# Patient Record
Sex: FEMALE | Race: WHITE | HISPANIC OR LATINO | Employment: STUDENT | URBAN - METROPOLITAN AREA
[De-identification: names, ages, dates, MRNs, and addresses within clinical notes are randomized per-mention and may not be internally consistent; named-entity substitution may affect disease eponyms.]

---
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
## 2022-01-12 ENCOUNTER — LAB VISIT (OUTPATIENT)
Dept: PRIMARY CARE CLINIC | Facility: CLINIC | Age: 22
End: 2022-01-12
Payer: COMMERCIAL

## 2022-01-12 DIAGNOSIS — Z20.822 CONTACT WITH AND (SUSPECTED) EXPOSURE TO COVID-19: ICD-10-CM

## 2022-01-12 LAB
CTP QC/QA: YES
SARS-COV-2 AG RESP QL IA.RAPID: NEGATIVE

## 2022-01-12 PROCEDURE — 87811 SARS-COV-2 COVID19 W/OPTIC: CPT

## 2022-03-17 ENCOUNTER — IMMUNIZATION (OUTPATIENT)
Dept: OBSTETRICS AND GYNECOLOGY | Facility: CLINIC | Age: 22
End: 2022-03-17
Payer: COMMERCIAL

## 2022-03-17 DIAGNOSIS — Z23 NEED FOR VACCINATION: Primary | ICD-10-CM

## 2022-03-17 PROCEDURE — 91305 COVID-19, MRNA, LNP-S, PF, 30 MCG/0.3 ML DOSE VACCINE (PFIZER): ICD-10-PCS | Mod: S$GLB,,, | Performed by: FAMILY MEDICINE

## 2022-03-17 PROCEDURE — 0053A COVID-19, MRNA, LNP-S, PF, 30 MCG/0.3 ML DOSE VACCINE (PFIZER): ICD-10-PCS | Mod: S$GLB,,, | Performed by: FAMILY MEDICINE

## 2022-03-17 PROCEDURE — 0053A COVID-19, MRNA, LNP-S, PF, 30 MCG/0.3 ML DOSE VACCINE (PFIZER): CPT | Mod: S$GLB,,, | Performed by: FAMILY MEDICINE

## 2022-03-17 PROCEDURE — 91305 COVID-19, MRNA, LNP-S, PF, 30 MCG/0.3 ML DOSE VACCINE (PFIZER): CPT | Mod: S$GLB,,, | Performed by: FAMILY MEDICINE

## 2022-04-26 ENCOUNTER — PATIENT MESSAGE (OUTPATIENT)
Dept: ENDOCRINOLOGY | Facility: CLINIC | Age: 22
End: 2022-04-26
Payer: COMMERCIAL

## 2022-05-12 ENCOUNTER — OFFICE VISIT (OUTPATIENT)
Dept: ENDOCRINOLOGY | Facility: CLINIC | Age: 22
End: 2022-05-12
Payer: COMMERCIAL

## 2022-05-12 VITALS
HEIGHT: 62 IN | HEART RATE: 84 BPM | DIASTOLIC BLOOD PRESSURE: 83 MMHG | WEIGHT: 204.19 LBS | SYSTOLIC BLOOD PRESSURE: 121 MMHG | BODY MASS INDEX: 37.58 KG/M2 | TEMPERATURE: 99 F

## 2022-05-12 DIAGNOSIS — E66.09 CLASS 2 OBESITY DUE TO EXCESS CALORIES WITHOUT SERIOUS COMORBIDITY WITH BODY MASS INDEX (BMI) OF 37.0 TO 37.9 IN ADULT: Primary | ICD-10-CM

## 2022-05-12 DIAGNOSIS — R79.89 ABNORMAL THYROID BLOOD TEST: ICD-10-CM

## 2022-05-12 DIAGNOSIS — L68.0 HIRSUTISM: ICD-10-CM

## 2022-05-12 DIAGNOSIS — R53.83 FATIGUE, UNSPECIFIED TYPE: ICD-10-CM

## 2022-05-12 DIAGNOSIS — L81.9 HYPERPIGMENTATION OF SKIN: ICD-10-CM

## 2022-05-12 PROBLEM — E66.812 CLASS 2 OBESITY DUE TO EXCESS CALORIES WITHOUT SERIOUS COMORBIDITY WITH BODY MASS INDEX (BMI) OF 37.0 TO 37.9 IN ADULT: Status: ACTIVE | Noted: 2022-05-12

## 2022-05-12 PROCEDURE — 3079F PR MOST RECENT DIASTOLIC BLOOD PRESSURE 80-89 MM HG: ICD-10-PCS | Mod: CPTII,S$GLB,, | Performed by: HOSPITALIST

## 2022-05-12 PROCEDURE — 99999 PR PBB SHADOW E&M-EST. PATIENT-LVL III: ICD-10-PCS | Mod: PBBFAC,,, | Performed by: HOSPITALIST

## 2022-05-12 PROCEDURE — 99214 PR OFFICE/OUTPT VISIT, EST, LEVL IV, 30-39 MIN: ICD-10-PCS | Mod: S$GLB,,, | Performed by: HOSPITALIST

## 2022-05-12 PROCEDURE — 99999 PR PBB SHADOW E&M-EST. PATIENT-LVL III: CPT | Mod: PBBFAC,,, | Performed by: HOSPITALIST

## 2022-05-12 PROCEDURE — 3074F PR MOST RECENT SYSTOLIC BLOOD PRESSURE < 130 MM HG: ICD-10-PCS | Mod: CPTII,S$GLB,, | Performed by: HOSPITALIST

## 2022-05-12 PROCEDURE — 3008F BODY MASS INDEX DOCD: CPT | Mod: CPTII,S$GLB,, | Performed by: HOSPITALIST

## 2022-05-12 PROCEDURE — 1159F MED LIST DOCD IN RCRD: CPT | Mod: CPTII,S$GLB,, | Performed by: HOSPITALIST

## 2022-05-12 PROCEDURE — 99214 OFFICE O/P EST MOD 30 MIN: CPT | Mod: S$GLB,,, | Performed by: HOSPITALIST

## 2022-05-12 PROCEDURE — 1159F PR MEDICATION LIST DOCUMENTED IN MEDICAL RECORD: ICD-10-PCS | Mod: CPTII,S$GLB,, | Performed by: HOSPITALIST

## 2022-05-12 PROCEDURE — 3079F DIAST BP 80-89 MM HG: CPT | Mod: CPTII,S$GLB,, | Performed by: HOSPITALIST

## 2022-05-12 PROCEDURE — 3074F SYST BP LT 130 MM HG: CPT | Mod: CPTII,S$GLB,, | Performed by: HOSPITALIST

## 2022-05-12 PROCEDURE — 3008F PR BODY MASS INDEX (BMI) DOCUMENTED: ICD-10-PCS | Mod: CPTII,S$GLB,, | Performed by: HOSPITALIST

## 2022-05-12 RX ORDER — DEXAMETHASONE 1 MG/1
1 TABLET ORAL NIGHTLY
Qty: 1 TABLET | Refills: 0 | Status: SHIPPED | OUTPATIENT
Start: 2022-05-12 | End: 2022-06-11

## 2022-05-12 NOTE — PATIENT INSTRUCTIONS
Thank you for completing the visit with me    In order for us to check your cortisol, we need to do the follow lab work:    For this, you will need to take 1 mg of dexamethasone at 11 PM and return the following morning at 8 AM for labs. The prescription is included with this letter/sent to your pharmacy.      It is important that you take the medication at the exact time, and get lab work done at  8 a.m the next day    The timing is important for these!  ---------------------------------------------------------------------------      Gerson Chan M.D  [facility] [address] [facility] [address], [address]  [place] [address]    Office:  [phone]  Fax:  [phone]

## 2022-05-12 NOTE — PROGRESS NOTES
Subjective:      Patient ID: Zeny Charles is a 21 y.o. female presented to Endocrinology clinic on 5/12/2022.    Chief Complaint:  Hyperpigmentation      History of Present Illness: Zeny Charles is a 21 y.o. female here for evaluation of multiple endocrine issues  Other medical issue:  Obesity     Interval history patient present for re-evaluation of thyroid dysfunction, previously given trial of low-dose levothyroxine 50 mcg, patient has since stopped taking his medication  She is here as she is concerned about hyper worsening hyperpigmentation of her neck area, hirsutism as well as mild menstrual cycle irregularity        With regards to thyroid nodule/thyroid disorder  Patient initially evaluated by me 2021 for thyroid nodule.  Patient with history of R thyroid nodule, small cystic appearing sub cm  Ultrasound done in 2016  Here for follow-up, denies any lower neck swelling at this time.    Patient is requesting lab work to evaluate thyroid function  Family history thyroid disorder: grandmother with hyperthyroidism    Given her symptoms we low I offered a trial of levothyroxine 50mcg.  Patient stopped taking this medication over the last  No known thyroid issue  Thyroid goiter:  No  Radiation Exposure:  No    Current symptoms:   No   Yes  []    [x]   Weight gain  []    [x]   Fatigue  [x]    []   Constipation  [x]    []   Hair loss  []    [x]   Brittle nails  []    [x]   Skin discoloration    Medications:  [x]    []   Lithium Use  [x]    []   Amiodarone use  [x]    []   Chemotherapy   [x]    []   Radiation Treatment  [x]    []   Supplements: Kelp, Iodine, Biotin, Selenium, Sea mayer, Bladder wrack    Reproductive Issue:  [x]    []   Infertility/abnormal menstruation  [x]    []   Recent pregnancy     Personal or Family History:  No   Yes  [x]    []   Diabetes Mellitus - Type 1  [x]    []   Celiac  [x]    []   Nonalcoholic fatty liver disease  [x]    []   Autoimmune disease  [x]    []   Anemia    Plan for pregnancy  "at this time: *no    Lab Results   Component Value Date    TSH 3.339 03/17/2021    TSH 0.975 09/09/2016    FREET4 0.96 03/17/2021    FREET4 0.93 09/09/2016    THYROPEROXID <6.0 03/17/2021     Thyroid ultrasound 3/21  Right thyroid lobe measures 5.0 x 1.5 x 1.6 cm.  There are 2 subcentimeter cystic nodules, 1 of which contains internal comet tail artifact in keeping with colloid, stable.  Left thyroid lobe measures 4.2 x 1.3 x 1.7 cm.  Parenchyma is homogeneous.  No microcalcifications.  Isthmus measures 0.3 cm. Parenchyma is homogeneous.  No microcalcifications.  No evidence for cervical lymphadenopathy.     Impression:     Right thyroid nodules not meeting criteria for fine-needle aspiration or surveillance    Patient reporting symptoms of:  Hirsutism/irregular menstrual cycle/hyperpigmentation/weight gain  Patient reports irregular years ago, with her to have to cycle in 1 month, then would skip 1 month  Denies being pregnant, recently had menstrual cycle  Not on birth control    Increase hair growth: chin and neck area  Some chest hair, umbilical area   Getting lazer hair removal  Sister with hair issue similar to patient    No increase in hand size, head size, deepening voice  Does not smoke, does not drink    Continue report weight gain, not on dietary modification, not on exercise regimen      Reviewed past surgical, medical, family, social history and updated as appropriate.    Review of Systems       Objective:   /83 (BP Location: Right arm)   Pulse 84   Temp 98.9 °F (37.2 °C) (Oral)   Ht 5' 2" (1.575 m)   Wt 92.6 kg (204 lb 3.2 oz)   BMI 37.35 kg/m²     Body mass index is 37.35 kg/m².  Vital signs reviewed    Physical Exam  Vitals and nursing note reviewed.   Constitutional:       General: She is not in acute distress.     Appearance: Normal appearance. She is well-developed. She is obese. She is not toxic-appearing.   HENT:      Head:      Comments: Mild hyperpigmentation of lower neck.  No " facial hair noted due to laser hair surgery  Neck:      Thyroid: No thyromegaly.      Comments: No obvious thyroid goiter  Pulmonary:      Effort: No respiratory distress.   Abdominal:      Tenderness: There is no abdominal tenderness.      Comments: Coarse hair around umbilicus   Musculoskeletal:         General: No deformity. Normal range of motion.      Cervical back: Normal range of motion.   Skin:     Findings: No bruising.   Neurological:      Mental Status: She is alert and oriented to person, place, and time.   Psychiatric:         Mood and Affect: Mood normal.         Lab Reviewed:   Lab Results   Component Value Date    HGBA1C 4.7 03/17/2021       Lab Results   Component Value Date    CHOL 137 04/18/2017    HDL 38 (L) 04/18/2017    LDLCALC 86.0 04/18/2017    TRIG 65 04/18/2017    CHOLHDL 27.7 04/18/2017       Lab Results   Component Value Date     03/17/2021    K 3.8 03/17/2021     03/17/2021    CO2 27 03/17/2021    GLU 88 03/17/2021    BUN 9 03/17/2021    CREATININE 0.7 03/17/2021    CALCIUM 9.0 03/17/2021    PROT 7.7 03/17/2021    ALBUMIN 4.0 03/17/2021    BILITOT 0.4 03/17/2021    ALKPHOS 67 03/17/2021    AST 15 03/17/2021    ALT 23 03/17/2021    ANIONGAP 10 03/17/2021    ESTGFRAFRICA >60 03/17/2021    EGFRNONAA >60 03/17/2021    TSH 3.339 03/17/2021        Lab Results   Component Value Date    CALCIUM 9.0 03/17/2021    ALKPHOS 67 03/17/2021    TSH 3.339 03/17/2021       Assessment     1. Class 2 obesity due to excess calories without serious comorbidity with body mass index (BMI) of 37.0 to 37.9 in adult  ACTH    Cortisol, 8AM    DHEA-sulfate    dexAMETHasone (DECADRON) 1 MG Tab    TSH    T4, Free    Comprehensive Metabolic Panel    Lipid Panel    Insulin-Like Growth Factor    17-Hydroxyprogesterone    Hemoglobin A1C    Prolactin   2. Abnormal thyroid blood test  TSH    T4, Free    Comprehensive Metabolic Panel    Lipid Panel    17-Hydroxyprogesterone    Prolactin   3. Hyperpigmentation of  skin  ACTH    Cortisol, 8AM    DHEA-sulfate    dexAMETHasone (DECADRON) 1 MG Tab    TSH    T4, Free    Comprehensive Metabolic Panel    Lipid Panel    17-Hydroxyprogesterone    Vitamin D    Hemoglobin A1C    Testosterone    Follicle Stimulating Hormone    Estradiol   4. Fatigue, unspecified type  TSH    T4, Free    Comprehensive Metabolic Panel    Lipid Panel    Vitamin D    Hemoglobin A1C    Prolactin   5. Hirsutism  Prolactin    Testosterone    Follicle Stimulating Hormone    Estradiol        Plan     No problem-specific Assessment & Plan notes found for this encounter.    Fatigue/Weight gain/Hyperpigmentation of skin  - patient reports concern multiple symptoms, requesting endocrine evaluation  - we will screen for thyroid dysfunction, screen for metabolic syndrome: PCOS, diabetes  - screening DST to evaluate Cushing's, prolactin  - not on dietary modification:  Recommend patient start on portion control/low calorie diet, recommend losing of 5-10% body weight over the next 3-6 month  - recommend starting exercise      Class 2 obesity due to excess calories without serious comorbidity with body mass index (BMI) of 37.0 to 37.9 in adult  - Body mass index is 37.35 kg/m².  - dietary discussion as above  - discussed workup to rule out Cushing's      Abnormal thyroid blood test   - patient with symptom of fatigue, elevation of TSH, positive a in the past, does have family history of thyroid dysfunction  - Interestingly negative antibody  - repeat TSH/FT4 for re-evaluation  - Okay to hold levothyroxine trial      Thyroid nodule  - ultrasound review small subcentimeter cystic appearing benign nodule on right gland  - no increase in in lower neck enlarged  - repeat ultrasound within the next 2 years      Hirsutism  - Here for evaluation of Hirsutism, does have family history, suspect possible idiopathic  - work up PCOS  - Screening Lab Work:    Testosterone, DHEAS (adrenal tumor),TSH, PRL, IGF-1, FSH/LH/estradiol  -  Pending work up, may need GYN      Advised patient to follow up with PCP for routine health maintenance care.   RTC in as needed pending workup above    Gerson Chan M.D  Endocrinology  [facility]  5/12/2022      Disclaimer: This note has been generated using voice-recognition software. There may be typographical errors that have been missed during proof-reading.

## 2022-05-25 ENCOUNTER — OFFICE VISIT (OUTPATIENT)
Dept: PSYCHIATRY | Facility: CLINIC | Age: 22
End: 2022-05-25
Payer: COMMERCIAL

## 2022-05-25 DIAGNOSIS — F90.0 ADHD (ATTENTION DEFICIT HYPERACTIVITY DISORDER), INATTENTIVE TYPE: Primary | ICD-10-CM

## 2022-05-25 PROCEDURE — 1159F PR MEDICATION LIST DOCUMENTED IN MEDICAL RECORD: ICD-10-PCS | Mod: CPTII,95,, | Performed by: NURSE PRACTITIONER

## 2022-05-25 PROCEDURE — 1160F PR REVIEW ALL MEDS BY PRESCRIBER/CLIN PHARMACIST DOCUMENTED: ICD-10-PCS | Mod: CPTII,95,, | Performed by: NURSE PRACTITIONER

## 2022-05-25 PROCEDURE — 99205 PR OFFICE/OUTPT VISIT, NEW, LEVL V, 60-74 MIN: ICD-10-PCS | Mod: 95,,, | Performed by: NURSE PRACTITIONER

## 2022-05-25 PROCEDURE — 1160F RVW MEDS BY RX/DR IN RCRD: CPT | Mod: CPTII,95,, | Performed by: NURSE PRACTITIONER

## 2022-05-25 PROCEDURE — 1159F MED LIST DOCD IN RCRD: CPT | Mod: CPTII,95,, | Performed by: NURSE PRACTITIONER

## 2022-05-25 PROCEDURE — 99205 OFFICE O/P NEW HI 60 MIN: CPT | Mod: 95,,, | Performed by: NURSE PRACTITIONER

## 2022-05-25 RX ORDER — DEXTROAMPHETAMINE SACCHARATE, AMPHETAMINE ASPARTATE, DEXTROAMPHETAMINE SULFATE AND AMPHETAMINE SULFATE 2.5; 2.5; 2.5; 2.5 MG/1; MG/1; MG/1; MG/1
10 TABLET ORAL 2 TIMES DAILY
Qty: 60 TABLET | Refills: 0 | Status: SHIPPED | OUTPATIENT
Start: 2022-07-25 | End: 2023-02-09

## 2022-05-25 RX ORDER — DEXTROAMPHETAMINE SACCHARATE, AMPHETAMINE ASPARTATE, DEXTROAMPHETAMINE SULFATE AND AMPHETAMINE SULFATE 2.5; 2.5; 2.5; 2.5 MG/1; MG/1; MG/1; MG/1
10 TABLET ORAL 2 TIMES DAILY
Qty: 60 TABLET | Refills: 0 | Status: SHIPPED | OUTPATIENT
Start: 2022-06-25 | End: 2023-02-09

## 2022-05-25 RX ORDER — DEXTROAMPHETAMINE SACCHARATE, AMPHETAMINE ASPARTATE, DEXTROAMPHETAMINE SULFATE AND AMPHETAMINE SULFATE 2.5; 2.5; 2.5; 2.5 MG/1; MG/1; MG/1; MG/1
10 TABLET ORAL 2 TIMES DAILY
Qty: 60 TABLET | Refills: 0 | Status: SHIPPED | OUTPATIENT
Start: 2022-05-25 | End: 2023-02-09

## 2022-05-25 NOTE — PROGRESS NOTES
Outpatient Psychiatry Initial Visit (MD/NP)    5/25/2022    Zeny Charles, a 21 y.o. female, presenting for initial evaluation visit. Met with patient.    Reason for Encounter: self-referral. Patient complains of attention problems  The patient location is: home  Visit type: audiovisual    Face to Face time with patient: 55 minutes  60 minutes minutes of total time spent on the encounter, which includes face to face time and non-face to face time preparing to see the patient (eg, review of tests), Obtaining and/or reviewing separately obtained history, Documenting clinical information in the electronic or other health record, Independently interpreting results (not separately reported) and communicating results to the patient/family/caregiver, or Care coordination (not separately reported).     Each patient to whom he or she provides medical services by telemedicine is:  (1) informed of the relationship between the physician and patient and the respective role of any other health care provider with respect to management of the patient; and (2) notified that he or she may decline to receive medical services by telemedicine and may withdraw from such care at any time.    History of Present Illness:    Pt is a 21-year old female who presents for psychiatric evaluation. Trouble with focusing on work. Affect appears bright with euthymic mood. Thought processes are linear, clear, and organized. Denies SI/HI/AVH.      ADHD Adult:  · Have difficulty sustaining attention in tasks or fun activities?  yes   · Don't follow through on instructions and fail to finish work?  yes   · Have difficulty organizing tasks and activities?  yes   · Avoid, dislike, or are reluctant to engage in work thar requires sustained mental effort?  yes   · Easily distracted?  yes   · Forgetful in daily activities?  yes   · Fidget with hands or feet, or squirm in seat?  no  · Have difficulty engaging in leisure activities or doing fun things quietly?   "no  · Feel "on the go" or "driven by a motor"?  yes   · Blurt out answers before questions have been completed?  yes   · Have difficulty waiting your turn, are impatient?  no  · Interrupt or intrude on others?  no    Past Psychiatric History: (none)    Psychiatric Medications: currently taking    Past trials include: (none)    Psychosocial History: Nusing school. Lives at home with family. Non family hx of mental nighat problems.   No cigarettes  No alchol  No drugs      PAST MEDICAL HISTORY:       Past Medical History:   Diagnosis Date    Factor 5 Leiden mutation, heterozygous 9/13/2016    Seizures       1 at the age of 10     Review Of Systems:     GENERAL:  No weight gain or loss  SKIN:  No rashes or lacerations  HEAD:  No headaches  EYES:  No exophthalmos, jaundice or blindness  EARS:  No dizziness, tinnitus or hearing loss  NOSE:  No changes in smell  MOUTH & THROAT:  No dyskinetic movements or obvious goiter  CHEST:  No shortness of breath, hyperventilation or cough  CARDIOVASCULAR:  No tachycardia or chest pain  ABDOMEN:  No nausea, vomiting, pain, constipation or diarrhea  URINARY:  No frequency, dysuria or sexual dysfunction  ENDOCRINE:  No polydipsia, polyuria  MUSCULOSKELETAL:  No pain or stiffness of the joints  NEUROLOGIC:  No weakness, sensory changes, seizures, confusion, memory loss, tremor or other abnormal movements    Current Evaluation:     Nutritional Screening: Considering the patient's height and weight, medications, medical history and preferences, should a referral be made to the dietitian? no    Constitutional  Vitals:  Most recent vital signs, dated greater than 90 days prior to this appointment, were not reviewed.    There were no vitals filed for this visit.     General:  unremarkable, age appropriate     Musculoskeletal  Muscle Strength/Tone:  no tic   Gait & Station:  non-ataxic     Psychiatric  Speech:  no latency; no press   Mood & Affect:  steady  congruent and appropriate   Thought " Process:  normal and logical   Associations:  intact   Thought Content:  normal, no suicidality, no homicidality, delusions, or paranoia   Insight:  intact   Judgement: behavior is adequate to circumstances   Orientation:  grossly intact   Memory: intact for content of interview   Language: grossly intact   Attention Span & Concentration:  able to focus   Fund of Knowledge:  intact and appropriate to age and level of education       Relevant Elements of Neurological Exam: not examined    Functioning in Relationships:  Spouse/partner: see above HPI  Peers: see above HPI  Employers: see above HPI    Laboratory Data  No visits with results within 1 Month(s) from this visit.   Latest known visit with results is:   Lab Visit on 01/12/2022   Component Date Value Ref Range Status    SARS Coronavirus 2 Antigen 01/12/2022 Negative  Negative Final     Acceptable 01/12/2022 Yes   Final         Medications  Outpatient Encounter Medications as of 5/25/2022   Medication Sig Dispense Refill    dexAMETHasone (DECADRON) 1 MG Tab Take 1 tablet (1 mg total) by mouth every evening. Take at 11 PM the night before labs to be drawn. 1 tablet 0    dextroamphetamine-amphetamine 10 mg Tab Take 1 tablet (10 mg total) by mouth 2 (two) times daily. 60 tablet 0    [START ON 6/25/2022] dextroamphetamine-amphetamine 10 mg Tab Take 1 tablet (10 mg total) by mouth 2 (two) times daily. 60 tablet 0    [START ON 7/25/2022] dextroamphetamine-amphetamine 10 mg Tab Take 1 tablet (10 mg total) by mouth 2 (two) times daily. 60 tablet 0    levothyroxine (SYNTHROID) 50 MCG tablet Take 1 tablet (50 mcg total) by mouth before breakfast. Please take as a single dose, on an empty stomach with glass of water, one-half to one hour before breakfast. (Patient not taking: Reported on 5/12/2022) 30 tablet 11     No facility-administered encounter medications on file as of 5/25/2022.     Assessment - Diagnosis - Goals:     Impression:        "ICD-10-CM ICD-9-CM   1. ADHD (attention deficit hyperactivity disorder), inattentive type  F90.0 314.00       Strengths and Liabilities: Strength: Patient accepts guidance/feedback, Strength: Patient is expressive/articulate., Strength: Patient is intelligent., Liability: Patient has poor health., Liability: Patient lacks coping skills.    Treatment Goals:  Specify outcomes written in observable, behavioral terms:   ADHD: will improve capacity for sustained attention and limit symptoms of distracibility    Treatment Plan/Recommendations:   · Medication Management: The risks and benefits of medication were discussed with the patient.  · The treatment plan and follow up plan were reviewed with the patient.   · Reviewed available medical records and labs  · Start Adderall 10 mg po BID  · Keep lists, calendar. Organize lists into tiers (A, B, C). "Only handle things once."  · Recommend Adult ADHD toolbox book by Merry. Also 7 Habits of Highly Effective People  · Counseling focused on behavior modification and adaptive strategies for adults with ADHD. .    Return to Clinic: 3 months    Counseling time: 34 minutes  Total time: 60 minutes  Consulting clinician was informed of the encounter and consult note.      "

## 2022-08-09 ENCOUNTER — OFFICE VISIT (OUTPATIENT)
Dept: FAMILY MEDICINE | Facility: CLINIC | Age: 22
End: 2022-08-09
Payer: COMMERCIAL

## 2022-08-09 ENCOUNTER — PATIENT MESSAGE (OUTPATIENT)
Dept: ADMINISTRATIVE | Facility: CLINIC | Age: 22
End: 2022-08-09
Payer: COMMERCIAL

## 2022-08-09 ENCOUNTER — PATIENT MESSAGE (OUTPATIENT)
Dept: FAMILY MEDICINE | Facility: CLINIC | Age: 22
End: 2022-08-09

## 2022-08-09 DIAGNOSIS — G40.909 SEIZURE DISORDER: ICD-10-CM

## 2022-08-09 DIAGNOSIS — E66.01 SEVERE OBESITY (BMI 35.0-39.9) WITH COMORBIDITY: ICD-10-CM

## 2022-08-09 DIAGNOSIS — D68.51 FACTOR 5 LEIDEN MUTATION, HETEROZYGOUS: ICD-10-CM

## 2022-08-09 DIAGNOSIS — U07.1 COVID-19: Primary | ICD-10-CM

## 2022-08-09 PROCEDURE — 99214 OFFICE O/P EST MOD 30 MIN: CPT | Mod: 95,,, | Performed by: NURSE PRACTITIONER

## 2022-08-09 PROCEDURE — 99214 PR OFFICE/OUTPT VISIT, EST, LEVL IV, 30-39 MIN: ICD-10-PCS | Mod: 95,,, | Performed by: NURSE PRACTITIONER

## 2022-08-09 RX ORDER — CETIRIZINE HYDROCHLORIDE 10 MG/1
10 TABLET ORAL DAILY
Qty: 10 TABLET | Refills: 0 | Status: SHIPPED | OUTPATIENT
Start: 2022-08-09 | End: 2022-09-22

## 2022-08-09 RX ORDER — BENZONATATE 100 MG/1
100 CAPSULE ORAL 3 TIMES DAILY PRN
Qty: 15 CAPSULE | Refills: 0 | Status: SHIPPED | OUTPATIENT
Start: 2022-08-09 | End: 2022-09-22

## 2022-08-09 NOTE — PATIENT INSTRUCTIONS
//////////PHONE NUMBERS//////////    Bariatrics---[phone]  Breast Surgery---[phone]  Case Management---[phone]  Colonoscopy---[phone]  Imaging, Xray, CT, MRI, Ultrasound---[phone]  Infectious Disease---[phone]  Interventional Radiology---[phone]  Medical Records---[phone]  [facility]---[phone]  DME---[phone]  Optometry/Ophthalmology---[phone]  [facility]---[phone]  Physical Therapy---[phone]  Psychiatry---[phone]  Plastic Surgery---[phone]  Sleep Study---[phone]  Smoking Cessation---[phone]  Vaccine Hotline---[phone]  Wound Care---[phone]  COVID Vaccine center---[phone]  Referral Desk---[phone]    /////////////////////////////////////////////////    Patient Education       Obesity Discharge Instructions, Adult   About this topic   Obesity is a health problem where your weight is higher than it should be. Doctors use a method called body mass index or BMI to measure whether you are at a healthy weight for your height. The doctor uses your weight and your height to determine your BMI. A high BMI can be an indicator of high body fat. Obesity is different from being overweight. Being overweight means your BMI is 25 or higher. Being obese means your BMI is 30 or higher. If your BMI is 40 or higher, you are considered severely or morbidly obese. Being obese may lead to many health problems. It may make it hard for you to breathe and move easily. It may raise your risk for illnesses like high blood sugar and heart disease.  What care is needed at home?   Ask your doctor what you need to do when you go home. Make sure you understand everything the doctor says. This way you will know what you need to do.  Change your diet. Lower the calories in your diet. Ask your dietitian to help you set an eating plan that is right for you.  Get enough exercise. Talk to your doctor about the right amount of exercise for you.  Do not  smoke.  Limit the amount of beer, wine, and mixed drinks (alcohol) you drink.  Keep a record of your weight. Write down what you eat and drink each day. This may help you be more aware of the choices you are making.  What drugs may be needed?   The doctor may order drugs to:  Treat health problems that may cause weight gain  Lower your appetite  Help you lose weight  Will physical activity be limited?   Talk to your doctor about the right amount of exercise for you. This is especially important if you have heart problems, other illnesses, or if you recently had surgery.  Start slowly by doing more everyday activities like yard work or household chores.  Choose activities that you like to do.  What changes to diet are needed?   Whole grains are a good source of carbohydrates and fiber. Try to eat 3 to 5 servings of whole grain, high fiber foods each day. These are things like whole grain bread, bran cereals, brown rice, and whole wheat pasta.  Fruits and vegetables are good sources of fiber, vitamins, and minerals. Try to pick many kinds and colors. This will help you get different nutrients in your diet. Choose fresh, frozen, or canned fruits and vegetables. Buy plain, frozen fruits without added sugar. Buy plain, frozen vegetables without added salt and fat. Buy canned fruit in 100% juice or water. Avoid canned fruit in syrups. Buy canned vegetables with no salt added.  Milk is a good source of protein and some vitamins and minerals. Choose low-fat (1%) or fat-free milk. Eat nonfat or low-fat cheeses, ice creams, yogurt, and other dairy products.  Meats and beans are good sources of protein and iron. Eat more low-fat or lean meats like chicken without the skin, turkey without the skin, and fish. Eggs and peanut butter are good sources of protein as well. Dried peas, beans, and lentils are also good and contain fiber. Fatty fish, like salmon, tuna, mackerel, herring, and trout, are good to eat and have healthy  omega-3 fats.  Good fats can give you long-term energy. These are found in fish, nuts, seeds, and avocados. Try using olive oil, safflower oil, and low-sodium, low-fat salad dressing as a topping on foods. Use canola, olive, or peanut oil for cooking. Other healthy oils include corn, sunflower, and soybean oils.  Limit sweets such as candy and sugary drinks. Try to drink water instead. Drink diet or no calorie beverages when you want something other than water.  Cut back on solid fats, like butter, lard, and coconut oil.  Limit fatty foods such as desserts, fried foods, and chips.  Trans fats should be avoided. Most trans fats are found in processed foods, commercially baked goods, and fried foods and are very unhealthy. Saturated fat, which is different from trans fat, should be watched and limited if portions are too large. Saturated fat is found mainly in animal sources, such as meat and dairy products. Saturated fat is also found in coconut and palm oils.  Limit processed meats and most processed foods.  Limit eating out. If you choose to visit a restaurant, ask for the nutritional facts. You may also be able to find nutritional facts online. Then, you can make a plan and choose healthy items. Watch the portion size. Have large portions split and take part home for some other meal.  What problems could happen?   Low mood or self-esteem  Anxiety  Muscle pain, joint pain, or arthritis  Sleep apnea  Diabetes  High blood pressure  High cholesterol  Heart, lung, or breathing problems  Kidney or liver problems  Cancer  Gallstones  Increased sweating  Reduced fertility  Pregnancy complications  Gastroesophageal reflux disease  When do I need to call the doctor?   Signs of high or low blood sugar  Thoughts of hurting yourself  Teach Back: Helping You Understand   The Teach Back Method helps you understand the information we are giving you. After you talk with the staff, tell them in your own words what you learned. This  "helps to make sure the staff has described each thing clearly. It also helps to explain things that may have been confusing. Before going home, make sure you can do these:  I can tell you about my condition.  I can tell you what changes I need to make with my diet or drugs.  I can tell you what I will do if I have signs of a heart attack or stroke.  Where can I learn more?   Centers for Disease Control and Prevention  http://www.cdc.gov/obesity/adult/defining.html   [facility]  http://www.nhs.uk/Conditions/Obesity/Pages/obesityprevention.aspx   Last Reviewed Date   2021-06-23  Consumer Information Use and Disclaimer   This information is not specific medical advice and does not replace information you receive from your health care provider. This is only a brief summary of general information. It does NOT include all information about conditions, illnesses, injuries, tests, procedures, treatments, therapies, discharge instructions or life-style choices that may apply to you. You must talk with your health care provider for complete information about your health and treatment options. This information should not be used to decide whether or not to accept your health care providers advice, instructions or recommendations. Only your health care provider has the knowledge and training to provide advice that is right for you.  Copyright   Copyright © 2021 [employer] and its affiliates and/or licensors. All rights reserved.  Patient Education       Weight Loss Diet   About this topic   There are many "trendy" weight loss diets that are popular today. Many of these diets can end up being more harmful than helpful. The healthiest way to lose weight is to burn more calories than you eat.  A weight loss diet should help you have a healthy view of eating. It is NOT healthy to stop eating to try and lose weight. A good diet plan will help you cut down your food intake and make healthy choices.  A healthy weight loss goal is 1 to 2 " pounds (0.5 to 1 kg) per week. Reducing calories in your diet, burning calories through exercise, or both can help you lose weight. Combining a healthy diet with regular physical activity can help you get the best results.  To cut calories in your diet you can:  Switch from whole milk to 1% or skim milk.  Switch from regular cheese to low-fat or fat-free cheese.  Use healthier condiment choices:  Fat-free or low-fat sour cream or salad dressings  Spray butter  Diet syrups or jellies over regular  Try frozen yogurt as a dessert rather than eating ice cream.  Skip the chips. Snack on carrots, vegetables, or fruit. If chips are a favorite of yours, try the baked style and watch portion size.  Eat grilled, roasted, boiled, broiled, or baked meats. Avoid deep-frying. Choose skinless poultry, lean red meat, lean cuts of pork, and fish for good protein sources.  Try flavored no-calorie tolbert. Do not drink soda and juices that have many calories.  Choose fruit instead of sweets.  General   Eating smaller meals more often may be helpful. This will keep you from overeating at your next meal. Also, eating meals slowly helps you feel full faster.  If eating 3 meals is a part of your lifestyle, choose more lean proteins and higher fiber foods to fill you up at each meal.  Do not skip meals. Most often if you skip a meal, you eat too much at the next meal.  Eat smaller portions. Use a smaller plate or bowl for meals, and when you are eating out, eat half and take the rest home.  Plan ahead. Plan your meals and grocery list before going to the store. Planning will keep you from getting meals from restaurants.  Do not go to the grocery store hungry. You are more likely to buy snacks that are not good for you.  Portion out snacks. When you are having a snack, instead of grabbing the whole bag, portion a small amount out to give yourself a stopping point.  Drink water before and after your meals to help fill you up without the  calories.  When eating starchy foods, choose whole-grain products. These have a lot of fiber which will make you feel full. Fiber also helps lower cholesterol and helps with bowel function.  If you need a helpful start, ask your doctor to send you to a dietitian for weight loss help.         What will the results be?   Losing excess weight will make your whole body healthier. You will have more energy for your daily activities and lower your risk for health problems.  What lifestyle changes are needed?   Stay active. Eating healthy is not always enough to lose weight. Burning calories by exercising is a big part of weight loss.  What foods are good to eat?   The key is to watch your portion sizes. It is best to choose foods that are lower in fat and calories.  Choose lean meats:  Boneless, skinless chicken breast  Pork loin  90% lean beef  Lean turkey meat  Fresh fish (not fried)  Choose low-fat dairy products:  1% or skim milk  Spray butter or margarine  Low-fat or fat-free cheese  Frozen yogurt or low-calorie ice cream  Choose fresh fruits, vegetables, beans and lentils, and whole wheat products more often.  Choose water to drink more often. Drink diet or no-calorie beverages when you want something other than water. Aim to get your calories from the foods you eat.  Choose smart snacks:  Fruits  Vegetables  Low-fat or nonfat yogurt  Low-fat or no-fat cheese, such as cottage cheese  Unsalted nuts  Hard-boiled egg  Hummus  Guacamole  Natural peanut butter  Popcorn with no butter ? use pepper, garlic, or another spice to taste  Whole grain crackers  What foods should be limited or avoided?   Limit high-fat, high-sodium, and high-calorie foods like:  Fried foods  Processed meats  Whole-fat dairy products  Candy, cookies, chips, pastries  Sausage, ly, any full-fat meats  Soda, juice  Beer, wine, and mixed drinks (alcohol)  Will there be any other care needed?   What do I do first before trying to lose weight?  Talk  to your doctor and dietitian to see if you need to lose weight. Work with them to set your weight loss goals.  If you have a chronic illness, such as high blood sugar or high blood pressure, ask a doctor or dietitian what diet and exercise is right for you.  Ask your doctor about how much you are able to exercise and what type of exercise is good for you.  Helpful tips   Keep a food journal to help keep you on track.  Join a support group.  Tips for burning calories:  If your workplace is near your house, choose to walk or bike to work instead of driving.  Take 20-minute walks each day. Walk around during your lunch break. You will not only burn calories, but will raise your energy for the rest of the day.  Take the stairs over the elevator.  Join a gym or exercise class with a friend.  Try to exercise 30 minutes a day for overall health. Three 10-minute sessions work too. Aim for 60 to 90 minutes a day to lose weight.  Drink lots of water before, during, and after exercise.  Where can I learn more?   [employer]y [employer] Dietetics  https://www.eatright.org/health/weight-loss/your-health-and-your-weight/back-to-basics-for-healthy-weight-loss   Centers for Disease Control and Prevention  https://www.cdc.gov/healthyweight/healthy_eating/index.html   Familydoctor.org  https://familydoctor.org/nutrition-weight-loss-need-know-fad-diets/   [facility]  https://www.nhs.uk/live-well/healthy-weight/12-tips-to-help-you-lose-weight/?tabname=you-and-your-weight   Last Reviewed Date   2021-06-24  Consumer Information Use and Disclaimer   This information is not specific medical advice and does not replace information you receive from your health care provider. This is only a brief summary of general information. It does NOT include all information about conditions, illnesses, injuries, tests, procedures, treatments, therapies, discharge instructions or life-style choices that may apply to you. You must talk with your health care  provider for complete information about your health and treatment options. This information should not be used to decide whether or not to accept your health care providers advice, instructions or recommendations. Only your health care provider has the knowledge and training to provide advice that is right for you.  Copyright   Copyright © 2021 [employer] and its affiliates and/or licensors. All rights reserved.

## 2022-08-09 NOTE — PROGRESS NOTES
The patient location is:  Patient Home  The chief complaint leading to consultation is: as below  Visit type: Virtual visit with synchronous audio and video  Total time spent with patient: 11 minutes  Each patient to whom he or she provides medical services by telemedicine is:  (1) informed of the relationship between the physician and patient and the respective role of any other health care provider with respect to management of the patient; and (2) notified that she may decline to receive medical services by telemedicine and may withdraw from such care at any time.      HPI     Chief Complaint:  COVID19      Zeny Charles is a 21 y.o. female with multiple medical diagnoses as listed in the medical history and problem list that presents for COVID19.  Pt is new to me but is known to this clinic with her last appointment being Visit date not found.      Cough  This is a new problem. The current episode started in the past 7 days. The problem has been unchanged. The problem occurs constantly. The cough is non-productive. Associated symptoms include chills, ear pain, a fever, nasal congestion, rhinorrhea and a sore throat. Pertinent negatives include no chest pain, ear congestion, headaches, heartburn, hemoptysis, myalgias, postnasal drip, rash, shortness of breath, sweats, weight loss or wheezing. The symptoms are aggravated by cold air. She has tried OTC cough suppressant for the symptoms. The treatment provided no relief. There is no history of asthma, bronchiectasis, bronchitis, COPD, emphysema, environmental allergies or pneumonia.     Pt tested positive for COVID 19 on 8/3/22. Pt is vaccinated against COVID19. Denies previous COVID19 Dx. Reports fever of 100.1 which was reduced with tylenol. She has been taking OTC tylenol and sudafed for symptoms. Denies chest pain. Reports shortness of breath but only while coughing. Her cough is non productive.     she is compliant with medications daily without any adverse  side effects.    Assessment & Plan     Problem List Items Addressed This Visit        Neuro    Seizure disorder (Chronic)    The current medical regimen is effective;  continue present plan         Hematology    Factor 5 Leiden mutation, heterozygous    The current medical regimen is effective;  continue present plan and medications.         Endocrine    Severe obesity (BMI 35.0-39.9) with comorbidity    We discussed weight issues and safe, effective ways of losing pounds, includin) diet:  low carbohydrate, low fat diet, stay away from fast food, fried and processed food, use whole grain, lot of fruits and vegetables, use healthy fat such as avocado, nuts and olive oil in reasonable quantity, stay away from sodas. Regular meals with lean proteins.  2) physical activity: ideally 150 min a week, with cardiovascular and resistance activity.  Patient was encouraged to set realistic attainable goals for weight loss, and we will follow up periodically.        Other Visit Diagnoses     COVID-19    -  Primary    Fever which breaks with admin of tylenol/NSAIDs. Appears sickly. Reports dyspnea on exertion and fatigue. Associated symptoms include congestion, cough. Denies chest pain. Symptom onset in the past 6 days ago.     Discussed pathophysiology, symptom/presentation, and management of COVID-19.    Continue with supportive care, Tylenol every 4-6 hours as needed for fever, headaches, sore throat, ear pain, bodyaches, and/or nasal/sinus inflammation.    Zyrtec and tessalon.     Discussed the importance of hydration and rest.     Enrolled in COVID19 home symptom monitoring.    Provided patient with material outlining COVID-19 and treatment plan    Discussed treatment options. Pt is outside the window for paxlovid.     Pt is concerned about possible pneumonia. Denies sputum production. Cxray ordered.     Recommend to stay inside and isolate yourself from family members, as well as washing your hands frequently and  drinking plenty of fluids. Wear a mask if you absolutely need to go out and practice social distancing.  Before resuming your regular activities, you should be fever-free for at least 72 hours WITHOUT taking any fever reducting medications (i.e, Tylenol) and/or being at least 5 days out from the initial positive test. Wear a facemask when around others. Cover your coughs and sneezes. Wash your hands often with soap and water; hand  can be used, too. Avoid sharing personal household items. Wipe down surfaces used daily.    ED precautions given.   Notify provider if symptoms do not resolve or increase in severity.   Patient verbalizes understanding and agrees with plan of care.      Relevant Medications    pulse oximeter (PULSE OXIMETER) device    Other Relevant Orders    X-Ray Chest PA And Lateral    COVID-19 Surveillance Program (Completed)          --------------------------------------------      Health Maintenance:  Health Maintenance       Date Due Completion Date    HPV Vaccines (3 - 3-dose series) 01/01/2022 8/25/2021    Influenza Vaccine (1) 09/01/2022 11/20/2021    TETANUS VACCINE 08/27/2023 8/27/2013          Health maintenance reviewed.      Follow Up:  Follow up in about 2 weeks (around 8/23/2022), or if symptoms worsen or fail to improve.    Exam     Review of Systems:  (as noted above)  Review of Systems   Constitutional: Positive for chills and fever. Negative for weight loss.   HENT: Positive for ear pain, rhinorrhea and sore throat. Negative for postnasal drip.    Respiratory: Positive for cough. Negative for hemoptysis, shortness of breath and wheezing.    Cardiovascular: Negative for chest pain.   Gastrointestinal: Negative for heartburn.   Musculoskeletal: Negative for myalgias.   Skin: Negative for rash.   Allergic/Immunologic: Negative for environmental allergies.   Neurological: Negative for headaches.       Physical Exam:   Physical Exam  Constitutional:       General: She is not in  acute distress.     Appearance: She is obese. She is ill-appearing. She is not toxic-appearing or diaphoretic.   Pulmonary:      Effort: No respiratory distress.   Neurological:      Mental Status: She is alert.   Psychiatric:         Mood and Affect: Mood is anxious.       There were no vitals filed for this visit.   There is no height or weight on file to calculate BMI.        History     Past Medical History:  Past Medical History:   Diagnosis Date    Factor 5 Leiden mutation, heterozygous 9/13/2016    Seizures     1 at the age of 10       Past Surgical History:  Past Surgical History:   Procedure Laterality Date    TONSILLECTOMY  6 years old       Social History:  Social History     Socioeconomic History    Marital status: Single   Tobacco Use    Smoking status: Never Smoker    Smokeless tobacco: Never Used   Substance and Sexual Activity    Alcohol use: No    Drug use: No    Sexual activity: Never   Social History Narrative    In 12th grade. Lives with Mom and Dad. No smokers. No alcohol, tobacco, illicit drugs. 1 dog.        Family History:  Family History   Problem Relation Age of Onset    Hypertension Mother     Irregular menses Mother     Factor V Leiden deficiency Mother     Interstitial cystitis Mother     No Known Problems Father     Factor V Leiden deficiency Sister     Interstitial cystitis Maternal Aunt     Factor V Leiden deficiency Maternal Grandmother     Thyroid disease Maternal Grandmother         hypothyroidism    Mitral valve prolapse Maternal Grandfather     Congenital heart disease Maternal Grandfather         valve    Crohn's disease Paternal Grandmother     Breast cancer Neg Hx     Colon cancer Neg Hx     Ovarian cancer Neg Hx     Arrhythmia Neg Hx     Early death Neg Hx     Heart attacks under age 50 Neg Hx     Pacemaker/defibrilator Neg Hx        Allergies and Medications: (updated and reviewed)  Review of patient's allergies indicates:  No Known  Allergies  Current Outpatient Medications   Medication Sig Dispense Refill    benzonatate (TESSALON) 100 MG capsule Take 1 capsule (100 mg total) by mouth 3 (three) times daily as needed for Cough. 15 capsule 0    cetirizine (ZYRTEC) 10 MG tablet Take 1 tablet (10 mg total) by mouth once daily. 10 tablet 0    dextroamphetamine-amphetamine 10 mg Tab Take 1 tablet (10 mg total) by mouth 2 (two) times daily. 60 tablet 0    dextroamphetamine-amphetamine 10 mg Tab Take 1 tablet (10 mg total) by mouth 2 (two) times daily. 60 tablet 0    dextroamphetamine-amphetamine 10 mg Tab Take 1 tablet (10 mg total) by mouth 2 (two) times daily. 60 tablet 0    levothyroxine (SYNTHROID) 50 MCG tablet Take 1 tablet (50 mcg total) by mouth before breakfast. Please take as a single dose, on an empty stomach with glass of water, one-half to one hour before breakfast. (Patient not taking: Reported on 5/12/2022) 30 tablet 11    pulse oximeter (PULSE OXIMETER) device by Apply Externally route 2 (two) times a day. Use twice daily at 8 AM and 3 PM and record the value in MyChart as directed. 1 each 0     No current facility-administered medications for this visit.       Patient Care Team:  Chris Rendon MD as PCP - General (Family Medicine)  Zahra Cartwright MA (Inactive) as Care Coordinator      The patient expressed understanding and no barriers to adherence were identified.      - The patient indicates understanding of these issues and agrees with the plan. Brief care plan is updated and reviewed with the patient as applicable.      - The patient was sent an After Visit Summary virtually that lists all medications with directions, allergies, education, orders placed during this encounter and follow-up instructions.      - I have reviewed the patient's medical information including past medical, family, and social history sections including the medications and allergies.      - We discussed the patient's current medications.     This  note was created by combination of typed  and MModal dictation.  Transcription errors may be present.  If there are any questions, please contact me.       Trever Olivares NP

## 2022-08-10 ENCOUNTER — NURSE TRIAGE (OUTPATIENT)
Dept: ADMINISTRATIVE | Facility: CLINIC | Age: 22
End: 2022-08-10
Payer: COMMERCIAL

## 2022-08-10 NOTE — TELEPHONE ENCOUNTER
Called for covid surveillance program #1 and mom answered and we were disconnected and attemtped  To call back without success and will try again later this afternoon. Pt was sent my chart message as well    Reason for Disposition   Message left with person in household    Protocols used: NO CONTACT OR DUPLICATE CONTACT CALL-A-[place]

## 2022-08-10 NOTE — TELEPHONE ENCOUNTER
Pt called for covid surveillance enrollment call #2 and she didn't answer and left vm  On moms number to call OOC if any problems. I will enroll in [id number] as this was second attempt for enrollment an  Sent my chart message as well will remove task  Reason for Disposition   Message left on unidentified voice mail. Phone number verified.    Protocols used: NO CONTACT OR DUPLICATE CONTACT CALL-A-[place]

## 2022-09-14 ENCOUNTER — OFFICE VISIT (OUTPATIENT)
Dept: PODIATRY | Facility: CLINIC | Age: 22
End: 2022-09-14
Payer: COMMERCIAL

## 2022-09-14 VITALS — BODY MASS INDEX: 37.56 KG/M2 | HEIGHT: 62 IN | WEIGHT: 204.13 LBS

## 2022-09-14 DIAGNOSIS — R20.2 PARESTHESIA: Primary | ICD-10-CM

## 2022-09-14 PROCEDURE — 3008F PR BODY MASS INDEX (BMI) DOCUMENTED: ICD-10-PCS | Mod: CPTII,S$GLB,, | Performed by: PODIATRIST

## 2022-09-14 PROCEDURE — 3008F BODY MASS INDEX DOCD: CPT | Mod: CPTII,S$GLB,, | Performed by: PODIATRIST

## 2022-09-14 PROCEDURE — 99999 PR PBB SHADOW E&M-EST. PATIENT-LVL III: ICD-10-PCS | Mod: PBBFAC,,, | Performed by: PODIATRIST

## 2022-09-14 PROCEDURE — 99999 PR PBB SHADOW E&M-EST. PATIENT-LVL III: CPT | Mod: PBBFAC,,, | Performed by: PODIATRIST

## 2022-09-14 PROCEDURE — 99203 PR OFFICE/OUTPT VISIT, NEW, LEVL III, 30-44 MIN: ICD-10-PCS | Mod: S$GLB,,, | Performed by: PODIATRIST

## 2022-09-14 PROCEDURE — 99203 OFFICE O/P NEW LOW 30 MIN: CPT | Mod: S$GLB,,, | Performed by: PODIATRIST

## 2022-09-14 PROCEDURE — 1159F PR MEDICATION LIST DOCUMENTED IN MEDICAL RECORD: ICD-10-PCS | Mod: CPTII,S$GLB,, | Performed by: PODIATRIST

## 2022-09-14 PROCEDURE — 1159F MED LIST DOCD IN RCRD: CPT | Mod: CPTII,S$GLB,, | Performed by: PODIATRIST

## 2022-09-19 ENCOUNTER — TELEPHONE (OUTPATIENT)
Dept: FAMILY MEDICINE | Facility: CLINIC | Age: 22
End: 2022-09-19
Payer: COMMERCIAL

## 2022-09-19 NOTE — TELEPHONE ENCOUNTER
----- Message from Gita Jurado sent at 9/19/2022  2:38 PM CDT -----  Type:  Patient Returning Call    Who Called:pt   Does the patient know what this is regarding?:TB orders  Would the patient rather a call back or a response via [facility]? Call back   Best Call Back Number:[phone]  Additional Information: pt would like order for TB put in for her asap

## 2022-09-19 NOTE — PROGRESS NOTES
Subjective:      Patient ID: Zeny Charles is a 22 y.o. female.    Chief Complaint: Numbness and Foot Problem (Left toes seperate)    Zeny is a 22 y.o. female who presents to the podiatry clinic  with complaint of  numbness both feet.  Onset of the symptoms was several months ago. Precipitating event: none known. Current symptoms include: ability to bear weight, but with some pain. Aggravating factors: any weight bearing. Symptoms have progressed to a point and plateaued. Patient has had no prior foot problems. Evaluation to date: none. Treatment to date: none. Patients rates pain 5/10 on pain scale.    Review of Systems   Constitutional: Negative for chills.   Cardiovascular:  Negative for chest pain and claudication.   Respiratory:  Negative for cough.    Skin:  Positive for color change, dry skin and nail changes.   Musculoskeletal:  Positive for joint pain.   Gastrointestinal:  Negative for nausea.   Neurological:  Positive for paresthesias. Negative for numbness.   Psychiatric/Behavioral:  The patient is not nervous/anxious.          Objective:      Physical Exam  Constitutional:       Appearance: She is well-developed.      Comments: Oriented to time, place, and person.   Cardiovascular:      Comments: DP and PT pulses are palpable bilaterally. 3 sec capillary refill time and toes and feet are warm to touch proximally .  There is  hair growth on the feet and toes b/l. There is no edema b/l. No spider veins or varicosities present b/l.     Musculoskeletal:      Comments: Equinus noted b/l ankles with < 10 deg DF noted. MMT 5/5 in DF/PF/Inv/Ev resistance with no reproduction of pain in any direction. Passive range of motion of ankle and pedal joints is painless b/l.     Feet:      Right foot:      Skin integrity: No callus or dry skin.      Left foot:      Skin integrity: No callus or dry skin.   Lymphadenopathy:      Comments: Negative lymphadenopathy bilateral popliteal fossa and tarsal tunnel.   Skin:      Comments: No open lesions, lacerations or wounds noted.Interdigital spaces clean, dry and intact b/l. No erythema noted to b/l foot.  Nails normal color and trophic qualities.     Neurological:      Mental Status: She is alert.      Comments: Light touch, proprioception, and sharp/dull sensation are all intact bilaterally. Protective threshold with the [place]-Wienstein monofilament is intact bilaterally.      Subjective paresthesias with no clearly identifiable source or trigger.        Psychiatric:         Behavior: Behavior is cooperative.             Assessment:       Encounter Diagnosis   Name Primary?    Paresthesia Yes         Plan:       Zeny was seen today for numbness and foot problem.    Diagnoses and all orders for this visit:    Paresthesia  -     Ambulatory referral/consult to Neurology; Future      I counseled the patient on her conditions, their implications and medical management.      Referral placed to neurology- paresthesia    Discussed conservative treatment with shoes of adequate dimensions, material, and style to alleviate symptoms and delay or prevent surgical intervention.    RTC PRN

## 2022-09-20 ENCOUNTER — TELEPHONE (OUTPATIENT)
Dept: FAMILY MEDICINE | Facility: CLINIC | Age: 22
End: 2022-09-20
Payer: COMMERCIAL

## 2022-09-20 NOTE — TELEPHONE ENCOUNTER
----- Message from Edna Starr sent at 9/20/2022  1:53 PM CDT -----  .Type:  Patient Returning Call    Who Called:  self   Who Left Message for Patient:  arincecilio     Does the patient know what this is regarding?: yes     Would the patient rather a call back or a response via [facility]?  Call     Best Call Back Number: [phone]

## 2022-09-20 NOTE — TELEPHONE ENCOUNTER
----- Message from Ally Landry sent at 9/20/2022 11:32 AM CDT -----  Regarding: Self/  [phone]  Type: Patient Call Back    Who called:  Patient    What is the request in detail:  Patient returned staffs call and would like a call back.  Thank you    Would the patient rather a call back or a response via [facility]?  Call back    Best call back number: [phone]           Thank you

## 2022-09-22 ENCOUNTER — LAB VISIT (OUTPATIENT)
Dept: LAB | Facility: HOSPITAL | Age: 22
End: 2022-09-22
Attending: FAMILY MEDICINE
Payer: COMMERCIAL

## 2022-09-22 ENCOUNTER — OFFICE VISIT (OUTPATIENT)
Dept: FAMILY MEDICINE | Facility: CLINIC | Age: 22
End: 2022-09-22
Payer: COMMERCIAL

## 2022-09-22 VITALS
SYSTOLIC BLOOD PRESSURE: 112 MMHG | WEIGHT: 199.5 LBS | HEIGHT: 62 IN | OXYGEN SATURATION: 97 % | HEART RATE: 83 BPM | BODY MASS INDEX: 36.71 KG/M2 | DIASTOLIC BLOOD PRESSURE: 76 MMHG | TEMPERATURE: 98 F

## 2022-09-22 DIAGNOSIS — Z00.00 ROUTINE PHYSICAL EXAMINATION: ICD-10-CM

## 2022-09-22 DIAGNOSIS — Z00.00 ROUTINE PHYSICAL EXAMINATION: Primary | ICD-10-CM

## 2022-09-22 DIAGNOSIS — Z71.85 HPV VACCINE COUNSELING: ICD-10-CM

## 2022-09-22 PROCEDURE — 3008F PR BODY MASS INDEX (BMI) DOCUMENTED: ICD-10-PCS | Mod: CPTII,S$GLB,, | Performed by: FAMILY MEDICINE

## 2022-09-22 PROCEDURE — 90471 IMMUNIZATION ADMIN: CPT | Mod: S$GLB,,, | Performed by: FAMILY MEDICINE

## 2022-09-22 PROCEDURE — 3044F PR MOST RECENT HEMOGLOBIN A1C LEVEL <7.0%: ICD-10-PCS | Mod: CPTII,S$GLB,, | Performed by: FAMILY MEDICINE

## 2022-09-22 PROCEDURE — 86480 TB TEST CELL IMMUN MEASURE: CPT | Performed by: FAMILY MEDICINE

## 2022-09-22 PROCEDURE — 90651 HPV VACCINE 9-VALENT 3 DOSE IM: ICD-10-PCS | Mod: S$GLB,,, | Performed by: FAMILY MEDICINE

## 2022-09-22 PROCEDURE — 3044F HG A1C LEVEL LT 7.0%: CPT | Mod: CPTII,S$GLB,, | Performed by: FAMILY MEDICINE

## 2022-09-22 PROCEDURE — 99999 PR PBB SHADOW E&M-EST. PATIENT-LVL III: ICD-10-PCS | Mod: PBBFAC,,, | Performed by: FAMILY MEDICINE

## 2022-09-22 PROCEDURE — 1159F PR MEDICATION LIST DOCUMENTED IN MEDICAL RECORD: ICD-10-PCS | Mod: CPTII,S$GLB,, | Performed by: FAMILY MEDICINE

## 2022-09-22 PROCEDURE — 99395 PREV VISIT EST AGE 18-39: CPT | Mod: 25,S$GLB,, | Performed by: FAMILY MEDICINE

## 2022-09-22 PROCEDURE — 90651 9VHPV VACCINE 2/3 DOSE IM: CPT | Mod: S$GLB,,, | Performed by: FAMILY MEDICINE

## 2022-09-22 PROCEDURE — 90471 HPV VACCINE 9-VALENT 3 DOSE IM: ICD-10-PCS | Mod: S$GLB,,, | Performed by: FAMILY MEDICINE

## 2022-09-22 PROCEDURE — 99999 PR PBB SHADOW E&M-EST. PATIENT-LVL III: CPT | Mod: PBBFAC,,, | Performed by: FAMILY MEDICINE

## 2022-09-22 PROCEDURE — 1159F MED LIST DOCD IN RCRD: CPT | Mod: CPTII,S$GLB,, | Performed by: FAMILY MEDICINE

## 2022-09-22 PROCEDURE — 99395 PR PREVENTIVE VISIT,EST,18-39: ICD-10-PCS | Mod: 25,S$GLB,, | Performed by: FAMILY MEDICINE

## 2022-09-22 PROCEDURE — 3008F BODY MASS INDEX DOCD: CPT | Mod: CPTII,S$GLB,, | Performed by: FAMILY MEDICINE

## 2022-09-22 NOTE — PROGRESS NOTES
[facility]  Progress Note    SUBJECTIVE:     Chief Complaint   Patient presents with    Annual Exam    tb screening       HPI   Zeny Charles  is a 22 y.o. female here for routine physical exam. Patient has no other new complaints/problems at this time.      Review of patient's allergies indicates:  No Known Allergies    Past Medical History:   Diagnosis Date    Factor 5 Leiden mutation, heterozygous 9/13/2016    Seizures     1 at the age of 10     Past Surgical History:   Procedure Laterality Date    TONSILLECTOMY  6 years old     Family History   Problem Relation Age of Onset    Hypertension Mother     Irregular menses Mother     Factor V Leiden deficiency Mother     Interstitial cystitis Mother     No Known Problems Father     Factor V Leiden deficiency Sister     Interstitial cystitis Maternal Aunt     Factor V Leiden deficiency Maternal Grandmother     Thyroid disease Maternal Grandmother         hypothyroidism    Mitral valve prolapse Maternal Grandfather     Congenital heart disease Maternal Grandfather         valve    Crohn's disease Paternal Grandmother     Breast cancer Neg Hx     Colon cancer Neg Hx     Ovarian cancer Neg Hx     Arrhythmia Neg Hx     Early death Neg Hx     Heart attacks under age 50 Neg Hx     Pacemaker/defibrilator Neg Hx      Social History     Tobacco Use    Smoking status: Never    Smokeless tobacco: Never   Substance Use Topics    Alcohol use: No    Drug use: No        Review of Systems   Constitutional:  Negative for chills, diaphoresis and fever.   HENT:  Negative for congestion, ear pain and sore throat.    Eyes:  Negative for photophobia and discharge.   Respiratory:  Negative for cough, shortness of breath and wheezing.    Cardiovascular:  Negative for chest pain and palpitations.   Gastrointestinal:  Negative for abdominal pain, constipation, diarrhea, nausea and vomiting.   Genitourinary:  Negative for dysuria and hematuria.   Musculoskeletal:  Negative for back pain  and myalgias.   Skin:  Negative for itching and rash.   Neurological:  Negative for dizziness, sensory change, focal weakness, weakness and headaches.   All other systems reviewed and are negative.  OBJECTIVE:     Vitals:    09/22/22 0817   BP: 112/76   Pulse: 83   Temp: 98.4 °F (36.9 °C)     Body mass index is 36.49 kg/m².    Physical Exam  Constitutional:       General: She is not in acute distress.     Appearance: She is not diaphoretic.   HENT:      Head: Normocephalic and atraumatic.      Right Ear: Tympanic membrane and ear canal normal. No hemotympanum. Tympanic membrane is not perforated, erythematous or bulging.      Left Ear: Tympanic membrane and ear canal normal. No hemotympanum. Tympanic membrane is not perforated, erythematous or bulging.      Mouth/Throat:      Pharynx: No oropharyngeal exudate.   Eyes:      Conjunctiva/sclera: Conjunctivae normal.      Pupils: Pupils are equal, round, and reactive to light.   Neck:      Thyroid: No thyromegaly.   Cardiovascular:      Rate and Rhythm: Normal rate and regular rhythm.      Heart sounds: Normal heart sounds. No murmur heard.    No friction rub. No gallop.   Pulmonary:      Effort: Pulmonary effort is normal. No respiratory distress.      Breath sounds: Normal breath sounds. No wheezing or rales.   Abdominal:      General: Bowel sounds are normal. There is no distension.      Palpations: Abdomen is soft.      Tenderness: There is no abdominal tenderness. There is no guarding or rebound.   Musculoskeletal:         General: No tenderness. Normal range of motion.   Lymphadenopathy:      Cervical: No cervical adenopathy.   Skin:     General: Skin is warm.      Findings: No erythema or rash.   Neurological:      Mental Status: She is alert and oriented to person, place, and time.       Old records were reviewed. Labs and/or images were independently reviewed.    ASSESSMENT     1. Routine physical examination    2. HPV vaccine counseling        PLAN:      Routine physical examination  -     CBC Auto Differential; Future  -     Hemoglobin A1C; Future  -     Comprehensive Metabolic Panel; Future  -     Lipid Panel; Future  -     TSH; Future  -     T4, Free; Future  -     QUANTIFERON GOLD TB; Future; Expected date: 09/22/2022  -     We briefly discussed diet, exercise, and routine preventive exams. All questions and comments addressed.    HPV vaccine counseling  -     HPV Vaccine (9-Valent) (3 Dose) (IM)      RTC PRKYARA Rendon MD  09/22/2022 8:30 AM

## 2022-09-23 LAB
GAMMA INTERFERON BACKGROUND BLD IA-ACNC: 0.02 IU/ML
M TB IFN-G CD4+ BCKGRND COR BLD-ACNC: -0.01 IU/ML
MITOGEN IGNF BCKGRD COR BLD-ACNC: 4.94 IU/ML
TB GOLD PLUS: NEGATIVE
TB2 - NIL: 0.01 IU/ML

## 2022-11-22 ENCOUNTER — OFFICE VISIT (OUTPATIENT)
Dept: NEUROLOGY | Facility: CLINIC | Age: 22
End: 2022-11-22
Payer: COMMERCIAL

## 2022-11-22 VITALS
HEIGHT: 62 IN | BODY MASS INDEX: 36.55 KG/M2 | DIASTOLIC BLOOD PRESSURE: 78 MMHG | WEIGHT: 198.63 LBS | HEART RATE: 81 BPM | SYSTOLIC BLOOD PRESSURE: 119 MMHG

## 2022-11-22 DIAGNOSIS — G57.12 MERALGIA PARESTHETICA OF LEFT SIDE: ICD-10-CM

## 2022-11-22 DIAGNOSIS — M54.16 LUMBAR RADICULOPATHY, ACUTE: Primary | ICD-10-CM

## 2022-11-22 PROCEDURE — 3008F PR BODY MASS INDEX (BMI) DOCUMENTED: ICD-10-PCS | Mod: CPTII,S$GLB,, | Performed by: STUDENT IN AN ORGANIZED HEALTH CARE EDUCATION/TRAINING PROGRAM

## 2022-11-22 PROCEDURE — 3008F BODY MASS INDEX DOCD: CPT | Mod: CPTII,S$GLB,, | Performed by: STUDENT IN AN ORGANIZED HEALTH CARE EDUCATION/TRAINING PROGRAM

## 2022-11-22 PROCEDURE — 3044F HG A1C LEVEL LT 7.0%: CPT | Mod: CPTII,S$GLB,, | Performed by: STUDENT IN AN ORGANIZED HEALTH CARE EDUCATION/TRAINING PROGRAM

## 2022-11-22 PROCEDURE — 3078F DIAST BP <80 MM HG: CPT | Mod: CPTII,S$GLB,, | Performed by: STUDENT IN AN ORGANIZED HEALTH CARE EDUCATION/TRAINING PROGRAM

## 2022-11-22 PROCEDURE — 3078F PR MOST RECENT DIASTOLIC BLOOD PRESSURE < 80 MM HG: ICD-10-PCS | Mod: CPTII,S$GLB,, | Performed by: STUDENT IN AN ORGANIZED HEALTH CARE EDUCATION/TRAINING PROGRAM

## 2022-11-22 PROCEDURE — 3074F PR MOST RECENT SYSTOLIC BLOOD PRESSURE < 130 MM HG: ICD-10-PCS | Mod: CPTII,S$GLB,, | Performed by: STUDENT IN AN ORGANIZED HEALTH CARE EDUCATION/TRAINING PROGRAM

## 2022-11-22 PROCEDURE — 99999 PR PBB SHADOW E&M-EST. PATIENT-LVL III: CPT | Mod: PBBFAC,,, | Performed by: STUDENT IN AN ORGANIZED HEALTH CARE EDUCATION/TRAINING PROGRAM

## 2022-11-22 PROCEDURE — 99204 PR OFFICE/OUTPT VISIT, NEW, LEVL IV, 45-59 MIN: ICD-10-PCS | Mod: S$GLB,,, | Performed by: STUDENT IN AN ORGANIZED HEALTH CARE EDUCATION/TRAINING PROGRAM

## 2022-11-22 PROCEDURE — 3074F SYST BP LT 130 MM HG: CPT | Mod: CPTII,S$GLB,, | Performed by: STUDENT IN AN ORGANIZED HEALTH CARE EDUCATION/TRAINING PROGRAM

## 2022-11-22 PROCEDURE — 1159F PR MEDICATION LIST DOCUMENTED IN MEDICAL RECORD: ICD-10-PCS | Mod: CPTII,S$GLB,, | Performed by: STUDENT IN AN ORGANIZED HEALTH CARE EDUCATION/TRAINING PROGRAM

## 2022-11-22 PROCEDURE — 1159F MED LIST DOCD IN RCRD: CPT | Mod: CPTII,S$GLB,, | Performed by: STUDENT IN AN ORGANIZED HEALTH CARE EDUCATION/TRAINING PROGRAM

## 2022-11-22 PROCEDURE — 3044F PR MOST RECENT HEMOGLOBIN A1C LEVEL <7.0%: ICD-10-PCS | Mod: CPTII,S$GLB,, | Performed by: STUDENT IN AN ORGANIZED HEALTH CARE EDUCATION/TRAINING PROGRAM

## 2022-11-22 PROCEDURE — 99204 OFFICE O/P NEW MOD 45 MIN: CPT | Mod: S$GLB,,, | Performed by: STUDENT IN AN ORGANIZED HEALTH CARE EDUCATION/TRAINING PROGRAM

## 2022-11-22 PROCEDURE — 99999 PR PBB SHADOW E&M-EST. PATIENT-LVL III: ICD-10-PCS | Mod: PBBFAC,,, | Performed by: STUDENT IN AN ORGANIZED HEALTH CARE EDUCATION/TRAINING PROGRAM

## 2022-11-22 NOTE — PROGRESS NOTES
Chief Complaint and Duration     Numbness and tingling in both feet, resolved.  Now with numbness and tingling on side of her L thigh    History of Present Illness     Zeny Charles is a 22 y.o. female with a history of multiple medical diagnoses including having Covid this past august. Presents for parasthesias.     States she was in [employer] for prolonged stay, afterwards got numbness and tingling in both feet and also toes , evaluated by podiatrist. Referred here for parasthesias.    States numbness and tingling in feet resolved. Now with numbness and tingling that is intermittently located on her lateral thigh to knee region on the left. No specific pattern but notices it when she sits down. Does have back pain, no weakness. Does not wake her up from sleep. No bowel or bladder issues.     Works as a .     Review of Systems: (positive bolded)  Constitutional: Negative for fatigue, activity change, fevers, or chills.   HENT:  Negative for new visual disturbances or difficulty swallowing.    Respiratory:  Negative for shortness of breath.    Cardiovascular:  Negative for palpitations.   Gastrointestinal:  Negative for constipation, nausea, or vomiting.   Endocrine: Negative for temperature instability/intolerance.   Genitourinary:  Negative for difficulty urinating.   Musculoskeletal:  Negative for neck pain, back pain, myalgias, joint swelling, or gait problem.  Skin:  Negative for rash or lesions.   Neurological:  Negative for seizures, headaches, dizziness, tremors, syncope, speech difficulty, weakness, light-headedness, or numbness.   Hematological:  Does not bruise/bleed easily.   Psychiatric/Behavioral: Negative for decreased concentration or sleep disturbance.    Review of patient's allergies indicates:  No Known Allergies  Current Outpatient Medications   Medication Sig Dispense Refill    dextroamphetamine-amphetamine 10 mg Tab Take 1 tablet (10 mg total) by mouth 2 (two) times daily.  60 tablet 0    dextroamphetamine-amphetamine 10 mg Tab Take 1 tablet (10 mg total) by mouth 2 (two) times daily. 60 tablet 0    dextroamphetamine-amphetamine 10 mg Tab Take 1 tablet (10 mg total) by mouth 2 (two) times daily. 60 tablet 0    pulse oximeter (PULSE OXIMETER) device by Apply Externally route 2 (two) times a day. Use twice daily at 8 AM and 3 PM and record the value in [employer] as directed. 1 each 0     No current facility-administered medications for this visit.       Medical History     Past Medical History:   Diagnosis Date    Factor 5 Leiden mutation, heterozygous 9/13/2016    Seizures     1 at the age of 10     Past Surgical History:   Procedure Laterality Date    TONSILLECTOMY  6 years old     Family History   Problem Relation Age of Onset    Hypertension Mother     Irregular menses Mother     Factor V Leiden deficiency Mother     Interstitial cystitis Mother     No Known Problems Father     Factor V Leiden deficiency Sister     Interstitial cystitis Maternal Aunt     Factor V Leiden deficiency Maternal Grandmother     Thyroid disease Maternal Grandmother         hypothyroidism    Mitral valve prolapse Maternal Grandfather     Congenital heart disease Maternal Grandfather         valve    Crohn's disease Paternal Grandmother     Breast cancer Neg Hx     Colon cancer Neg Hx     Ovarian cancer Neg Hx     Arrhythmia Neg Hx     Early death Neg Hx     Heart attacks under age 50 Neg Hx     Pacemaker/defibrilator Neg Hx      Social History     Socioeconomic History    Marital status: Single   Tobacco Use    Smoking status: Never    Smokeless tobacco: Never   Substance and Sexual Activity    Alcohol use: No    Drug use: No    Sexual activity: Never   Social History Narrative    In 12th grade. Lives with Mom and Dad. No smokers. No alcohol, tobacco, illicit drugs. 1 dog.        Exam     Vitals:    11/22/22 0746   BP: 119/78   Pulse: 81      Physical Exam:  General: She is not in acute distress. She is not  ill-appearing.   HENT: Normocephalic and atraumatic. Moist mucous membranes.  Eyes: Conjunctivae normal.   Pulmonary: Pulmonary effort is normal.   Abdominal: Abdomen is soft and flat.   Skin: Skin is warm and dry. No rashes.   Psychiatric: Mood normal.        Neurologic Exam   Mental status: oriented to person, place, and time  Attention: Normal. Concentration: normal.  Speech: speech is normal.  Cranial Nerves: PERRL, EOMI intact, V1-V3 Facial sensation intact. Symmetric facies. Hearing grossly intact. Palate and uvula midline, symmetric. No tongue deviation. Trapezius strength intact.     Motor exam: bulk and tone normal. Strength 5/5 in bilateral upper extremities: deltoids, biceps, triceps, wrist flexion/extension, finger abduction/adduction. Strength 5/5 in bilateral lower extremities: hip flexion/extension, thigh adduction/abduction, knee flexion/extension, dorsiflexion/plantarflexion, foot eversion/inversion.    Reflexes: 2+ in bilateral upper extremities: biceps and brachiaradialis, 2+ in bilateral lower extremities: patellar and achilles  Plantar reflex: normal    Sensory exam: decreased to light touch in region of femoral and lateral femoral cutaneous region    Gait exam: normal  Romberg: negative  Coordination: normal    Tremor: none    Labs and Imaging     Labs: reviewed personally  , TSH nml, A1C 4.8    Imaging: reviewed  Other procedures: reviewed    Assessment and Plan     Problem List Items Addressed This Visit    None  Visit Diagnoses       Lumbar radiculopathy, acute    -  Primary    Relevant Orders    MRI Lumbar Spine Without Contrast    Meralgia paresthetica of left side        Relevant Orders    MRI Lumbar Spine Without Contrast            Follow-up: Follow up if symptoms worsen or fail to improve. Will message patient w results.

## 2022-11-23 ENCOUNTER — PATIENT MESSAGE (OUTPATIENT)
Dept: PULMONOLOGY | Facility: CLINIC | Age: 22
End: 2022-11-23
Payer: COMMERCIAL

## 2022-12-01 ENCOUNTER — HOSPITAL ENCOUNTER (OUTPATIENT)
Dept: RADIOLOGY | Facility: HOSPITAL | Age: 22
Discharge: HOME | End: 2022-12-01
Attending: STUDENT IN AN ORGANIZED HEALTH CARE EDUCATION/TRAINING PROGRAM
Payer: COMMERCIAL

## 2022-12-01 DIAGNOSIS — G57.12 MERALGIA PARESTHETICA OF LEFT SIDE: ICD-10-CM

## 2022-12-01 DIAGNOSIS — M54.16 LUMBAR RADICULOPATHY, ACUTE: ICD-10-CM

## 2022-12-01 PROCEDURE — 72148 MRI LUMBAR SPINE W/O DYE: CPT | Mod: TC

## 2022-12-01 PROCEDURE — 72148 MRI LUMBAR SPINE WITHOUT CONTRAST: ICD-10-PCS | Mod: 26,,, | Performed by: RADIOLOGY

## 2022-12-01 PROCEDURE — 72148 MRI LUMBAR SPINE W/O DYE: CPT | Mod: 26,,, | Performed by: RADIOLOGY

## 2022-12-04 ENCOUNTER — PATIENT MESSAGE (OUTPATIENT)
Dept: NEUROLOGY | Facility: CLINIC | Age: 22
End: 2022-12-04
Payer: COMMERCIAL

## 2022-12-06 DIAGNOSIS — G57.10 MERALGIA PARESTHETICA, UNSPECIFIED LATERALITY: Primary | ICD-10-CM

## 2022-12-06 DIAGNOSIS — M54.16 LUMBAR RADICULOPATHY, CHRONIC: ICD-10-CM

## 2022-12-06 RX ORDER — GABAPENTIN 300 MG/1
300 CAPSULE ORAL 3 TIMES DAILY
Qty: 90 CAPSULE | Refills: 5 | Status: SHIPPED | OUTPATIENT
Start: 2022-12-06 | End: 2023-02-08

## 2022-12-12 NOTE — PROGRESS NOTES
[facility]   Physical Therapy Initial Evaluation     Date: 12/13/2022   Name: Zeny Charles  Clinic Number: [id number]    Therapy Diagnosis:   Encounter Diagnoses   Name Primary?    Meralgia paresthetica, unspecified laterality     Lumbar radiculopathy, chronic     Decreased strength, endurance, and mobility     Impaired sensation     Impaired functional mobility, balance, and endurance      Physician: Marcela Rendon MD    Physician Orders: PT Eval and Treat   Medical Diagnosis from Referral:   G57.10 (ICD-10-CM) - Meralgia paresthetica, unspecified laterality   M54.16 (ICD-10-CM) - Lumbar radiculopathy, chronic     Evaluation Date: 12/13/2022  Authorization Period Expiration: 12/13/2023  Plan of Care Expiration: 02/21/2023  Progress Note Due: 1/13/2023  Visit # / Visits authorized: 1/ 1   FOTO: 1/3    Precautions: Standard     Time In: 9:30  Time Out: 10:20  Total Appointment Time (timed & untimed codes): 50 minutes      SUBJECTIVE     Date of onset: few months ago (August) of insidious onset. Started having issues mostly R anterior/lateral thigh with numbness/tingling    History of current condition - Zeny reports: insidious onset of lumbar pain and numbness/tingling that began down her R anterior/lateral thigh. Patient reports pain is central low back, but symptoms occasionally travel down bilateral lower extremities and into bilateral feet. Patient also reports she often feels unsteady on her feet    Falls: none reported, but reports poor balance    Imaging, MRI studies: 11/22/22  Degenerative disc disease L4-5.  Mild disc bulge and subtle posterior central annular fissure noted, no resulting canal stenosis or foraminal narrowing.    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, student in nursing school  Current Level of Function: difficulty bending, lifting     Pain:  Current 0/10, worst 8/10, best 0/10   Location: right back ,  buttocks , and feet    Description: Aching (mid low back)  Aggravating Factors: Flexing and Lifting  Easing Factors:  sitting, ibuprofen (pain)    Patients goals: decrease numbness, pain     Medical History:   Past Medical History:   Diagnosis Date    Factor 5 Leiden mutation, heterozygous 9/13/2016    Seizures     1 at the age of 10       Surgical History:   Zeny Charles  has a past surgical history that includes Tonsillectomy (6 years old).    Medications:   Zeny has a current medication list which includes the following prescription(s): dextroamphetamine-amphetamine, dextroamphetamine-amphetamine, dextroamphetamine-amphetamine, gabapentin, and pulse oximeter.    Allergies:   Review of patient's allergies indicates:  No Known Allergies       OBJECTIVE     Observation: no difficulty transitions from sit<> stand, bed mobility    Posture:  increased lordosis, knee valgus, genu recurvatum    Lumbar Range of Motion:    Limitations Pain   Flexion Fingerprints to toes   +      Extension 60   +        Left Side Bending WFL +        Right Side Bending WFL -            Lower Extremity Strength  Right LE  Left LE    Quadriceps: 3+/5 Quadriceps: 4/5   Hamstrings: 3+/5 Hamstrings: 3+/5   Iliospoas: 3+/5 Iliospoas: 3+/5   Hip extension:  3/5 Hip extension: 3+/5   Hip abduction: 3-/5 Hip abduction: 4/5   Ankle dorsiflexion: 4/5 Ankle dorsiflexion: 4/5   Ankle plantarflexion: 3/5 Ankle plantarflexion: 3/5     Sensation: intact, altered thought R L4-L5-S1, L L4    Reflexes:  -Patellar (L3-L4): 2+ bilaterally  -Achilles (S1): 2+ bilaterally     Special Tests:  -SLR Test: + on R, - on L  -Repeated Flexion: painful, no change in symptoms  -Repeated Ext: worse with repetition  -Prone Instability Test: -  -Bridge Test: -  -Slump Test: + on R    SI Special Tests:   Distraction: -  Compression: -  SI thigh thrust: -  Sacral thrust: -    Joint Mobility: TTP, guarded throughout lumbar spine  -Segmental mobility testing:  guarded    Palpation: TTP throughout lower lumbar paraspinals  -Erector Spinae: TTP   -Multifidi activation: poor/fair    Flexibility:   -Ely's test: R = - ; L = -  -Hamstring : R = - ; L = -  -Dedrick's test: R = - ; L = -  -Tony Test: NT      Limitation/Restriction for FOTO Modified Oswestry Low Back Pain  Disability Survey    Therapist reviewed FOTO scores for Zeny Charles on 12/13/2022.   FOTO documents entered into [employer] - see Media section.    Limitation Score: 33%         TREATMENT     Total Treatment time (time-based codes) separate from Evaluation: 20 minutes      Zeny received the treatments listed below:      therapeutic exercises to develop strength, endurance, ROM, flexibility, posture, and core stabilization for 15 minutes including:  Bridges 2x10  Hooklying marches 2x10 each leg  Education     manual therapy techniques: Joint mobilizations, Manual traction, and Soft tissue Mobilization were applied to the: lumbar spine for 0 minutes, including:  Not applied due to guarding    neuromuscular re-education activities to improve: Sense, Proprioception, and Posture for 15 minutes. The following activities were included:  PPT with TrA activation  Sciatic nerve glide x15 each leg    therapeutic activities to improve functional performance for 0  minutes, including:  Lifting, carrying    hot pack:0  cold pack: 0      PATIENT EDUCATION AND HOME EXERCISES     Education provided:   -anatomy of nerve roots, neural symptoms  - flexion vs extension based movements  - body position during job duties     Written Home Exercises Provided: yes. Exercises were reviewed and Zeny was able to demonstrate them prior to the end of the session.  Zeny demonstrated good  understanding of the education provided. See EMR under Patient Instructions for exercises provided during therapy sessions.    ASSESSMENT     Zeny is a 22 y.o. female referred to outpatient Physical Therapy with a medical diagnosis of meralgia paresthetica and  lumbar radiculopathy. Patient presents with painful lumbar mobility and decreased strength of lower extremities. Patient also presents with altered sensation throughout L4 bilaterally and L5 on right lower extremity. Patient presents with s/s consistent with bulging disc with neural involvement. Patient demonstrates difficulty and increased pain with functional activities such as bending and lifting. Patient will benefit from skilled physical therapy to improve body mechanics and postural awareness    Patient prognosis is Good.   Patient will benefit from skilled outpatient Physical Therapy to address the deficits stated above and in the chart below, provide patient /family education, and to maximize patientt's level of independence.     Plan of care discussed with patient: Yes  Patient's spiritual, cultural and educational needs considered and patient is agreeable to the plan of care and goals as stated below:     Anticipated Barriers for therapy: scheduling     Medical Necessity is demonstrated by the following  History  Co-morbidities and personal factors that may impact the plan of care Co-morbidities:   none    Personal Factors:   age  coping style  lifestyle     low   Examination  Body Structures and Functions, activity limitations and participation restrictions that may impact the plan of care Body Regions:   back  lower extremities    Body Systems:    gross symmetry  ROM  strength  balance  transfers  transitions    Participation Restrictions:   Bending, lifting     Activity limitations:   Learning and applying knowledge  no deficits    Mobility  no deficits    Self care  no deficits    Domestic Life  no deficits    Community and Social Life  no deficits         low   Clinical Presentation stable and uncomplicated low   Decision Making/ Complexity Score: low     GOALS: Short Term Goals:  4 weeks  1.Report decreased lumbar pain  < / =  5/10  to increase tolerance for lifting objects  2. Increase flexion ROM  to be pain free  in order to perform ADLs without difficulty.  3. Increase strength by 1/3 MMT grade in lower extremities  to increase tolerance for ADL and work activities.  4. Pt to tolerate HEP to improve ROM and independence with ADL's    Long Term Goals: 8 weeks  1.Report decreased lumbar pain < / = 3/10  to increase tolerance for lifting objects  2.Patient goal: improve balance reactions  3.Increase strength to 4+/5 in  lower extremities  to increase tolerance for ADL and work activities.  4. Pt will report at 20% limitation on FOTO  to demonstrate increase in LE function with every day tasks.       PLAN   Plan of care Certification: 12/13/2022 to 02/23/2023.    Outpatient Physical Therapy 1 times weekly for 8 weeks to include the following interventions: Cervical/Lumbar Traction, Manual Therapy, Neuromuscular Re-ed, Patient Education, Self Care, Therapeutic Activities, and Therapeutic Exercise.     Tamica Meek, PT      I CERTIFY THE NEED FOR THESE SERVICES FURNISHED UNDER THIS PLAN OF TREATMENT AND WHILE UNDER MY CARE   Physician's comments:     Physician's Signature: ___________________________________________________

## 2022-12-13 ENCOUNTER — CLINICAL SUPPORT (OUTPATIENT)
Dept: REHABILITATION | Facility: HOSPITAL | Age: 22
End: 2022-12-13
Attending: STUDENT IN AN ORGANIZED HEALTH CARE EDUCATION/TRAINING PROGRAM
Payer: COMMERCIAL

## 2022-12-13 DIAGNOSIS — M54.16 LUMBAR RADICULOPATHY, CHRONIC: ICD-10-CM

## 2022-12-13 DIAGNOSIS — G57.10 MERALGIA PARESTHETICA, UNSPECIFIED LATERALITY: ICD-10-CM

## 2022-12-13 DIAGNOSIS — R20.1 IMPAIRED SENSATION: ICD-10-CM

## 2022-12-13 DIAGNOSIS — Z74.09 DECREASED STRENGTH, ENDURANCE, AND MOBILITY: ICD-10-CM

## 2022-12-13 DIAGNOSIS — R53.1 DECREASED STRENGTH, ENDURANCE, AND MOBILITY: ICD-10-CM

## 2022-12-13 DIAGNOSIS — R68.89 DECREASED STRENGTH, ENDURANCE, AND MOBILITY: ICD-10-CM

## 2022-12-13 DIAGNOSIS — Z74.09 IMPAIRED FUNCTIONAL MOBILITY, BALANCE, AND ENDURANCE: ICD-10-CM

## 2022-12-13 PROCEDURE — 97161 PT EVAL LOW COMPLEX 20 MIN: CPT | Mod: PN

## 2022-12-13 PROCEDURE — 97112 NEUROMUSCULAR REEDUCATION: CPT | Mod: PN

## 2022-12-13 PROCEDURE — 97110 THERAPEUTIC EXERCISES: CPT | Mod: PN

## 2022-12-13 NOTE — PLAN OF CARE
[facility]   Physical Therapy Initial Evaluation     Date: 12/13/2022   Name: Zeny Charles  Clinic Number: [id number]    Therapy Diagnosis:   Encounter Diagnoses   Name Primary?    Meralgia paresthetica, unspecified laterality     Lumbar radiculopathy, chronic     Decreased strength, endurance, and mobility     Impaired sensation     Impaired functional mobility, balance, and endurance      Physician: Marcela Rendon MD    Physician Orders: PT Eval and Treat   Medical Diagnosis from Referral:   G57.10 (ICD-10-CM) - Meralgia paresthetica, unspecified laterality   M54.16 (ICD-10-CM) - Lumbar radiculopathy, chronic     Evaluation Date: 12/13/2022  Authorization Period Expiration: 12/13/2023  Plan of Care Expiration: 02/21/2023  Progress Note Due: 1/13/2023  Visit # / Visits authorized: 1/ 1   FOTO: 1/3    Precautions: Standard     Time In: 9:30  Time Out: 10:20  Total Appointment Time (timed & untimed codes): 50 minutes      SUBJECTIVE     Date of onset: few months ago (August) of insidious onset. Started having issues mostly R anterior/lateral thigh with numbness/tingling    History of current condition - Zeny reports: insidious onset of lumbar pain and numbness/tingling that began down her R anterior/lateral thigh. Patient reports pain is central low back, but symptoms occasionally travel down bilateral lower extremities and into bilateral feet. Patient also reports she often feels unsteady on her feet    Falls: none reported, but reports poor balance    Imaging, MRI studies: 11/22/22  Degenerative disc disease L4-5.  Mild disc bulge and subtle posterior central annular fissure noted, no resulting canal stenosis or foraminal narrowing.    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, student in nursing school  Current Level of Function: difficulty bending, lifting     Pain:  Current 0/10, worst 8/10, best 0/10   Location: right back ,  buttocks , and feet    Description: Aching (mid low back)  Aggravating Factors: Flexing and Lifting  Easing Factors: sitting, ibuprofen (pain)    Patients goals: decrease numbness, pain     Medical History:   Past Medical History:   Diagnosis Date    Factor 5 Leiden mutation, heterozygous 9/13/2016    Seizures     1 at the age of 10       Surgical History:   Zeny Charles  has a past surgical history that includes Tonsillectomy (6 years old).    Medications:   Zeny has a current medication list which includes the following prescription(s): dextroamphetamine-amphetamine, dextroamphetamine-amphetamine, dextroamphetamine-amphetamine, gabapentin, and pulse oximeter.    Allergies:   Review of patient's allergies indicates:  No Known Allergies       OBJECTIVE     Observation: no difficulty transitions from sit<> stand, bed mobility    Posture:  increased lordosis, knee valgus, genu recurvatum    Lumbar Range of Motion:    Limitations Pain   Flexion Fingerprints to toes   +      Extension 60   +        Left Side Bending WFL +        Right Side Bending WFL -            Lower Extremity Strength  Right LE  Left LE    Quadriceps: 3+/5 Quadriceps: 4/5   Hamstrings: 3+/5 Hamstrings: 3+/5   Iliospoas: 3+/5 Iliospoas: 3+/5   Hip extension:  3/5 Hip extension: 3+/5   Hip abduction: 3-/5 Hip abduction: 4/5   Ankle dorsiflexion: 4/5 Ankle dorsiflexion: 4/5   Ankle plantarflexion: 3/5 Ankle plantarflexion: 3/5     Sensation: intact, altered thought R L4-L5-S1, L L4    Reflexes:  -Patellar (L3-L4): 2+ bilaterally  -Achilles (S1): 2+ bilaterally     Special Tests:  -SLR Test: + on R, - on L  -Repeated Flexion: painful, no change in symptoms  -Repeated Ext: worse with repetition  -Prone Instability Test: -  -Bridge Test: -  -Slump Test: + on R    SI Special Tests:   Distraction: -  Compression: -  SI thigh thrust: -  Sacral thrust: -    Joint Mobility: TTP, guarded throughout lumbar spine  -Segmental mobility testing:  guarded    Palpation: TTP throughout lower lumbar paraspinals  -Erector Spinae: TTP   -Multifidi activation: poor/fair    Flexibility:   -Ely's test: R = - ; L = -  -Hamstring : R = - ; L = -  -Dedrick's test: R = - ; L = -  -Tony Test: NT      Limitation/Restriction for FOTO Modified Oswestry Low Back Pain  Disability Survey    Therapist reviewed FOTO scores for Zeny Charles on 12/13/2022.   FOTO documents entered into [employer] - see Media section.    Limitation Score: 33%         TREATMENT     Total Treatment time (time-based codes) separate from Evaluation: 20 minutes      Zeny received the treatments listed below:      therapeutic exercises to develop strength, endurance, ROM, flexibility, posture, and core stabilization for 15 minutes including:  Bridges 2x10  Hooklying marches 2x10 each leg  Education     manual therapy techniques: Joint mobilizations, Manual traction, and Soft tissue Mobilization were applied to the: lumbar spine for 0 minutes, including:  Not applied due to guarding    neuromuscular re-education activities to improve: Sense, Proprioception, and Posture for 15 minutes. The following activities were included:  PPT with TrA activation  Sciatic nerve glide x15 each leg    therapeutic activities to improve functional performance for 0  minutes, including:  Lifting, carrying    hot pack:0  cold pack: 0      PATIENT EDUCATION AND HOME EXERCISES     Education provided:   -anatomy of nerve roots, neural symptoms  - flexion vs extension based movements  - body position during job duties     Written Home Exercises Provided: yes. Exercises were reviewed and Zeny was able to demonstrate them prior to the end of the session.  Zeny demonstrated good  understanding of the education provided. See EMR under Patient Instructions for exercises provided during therapy sessions.    ASSESSMENT     Zeny is a 22 y.o. female referred to outpatient Physical Therapy with a medical diagnosis of meralgia paresthetica and  lumbar radiculopathy. Patient presents with painful lumbar mobility and decreased strength of lower extremities. Patient also presents with altered sensation throughout L4 bilaterally and L5 on right lower extremity. Patient presents with s/s consistent with bulging disc with neural involvement. Patient demonstrates difficulty and increased pain with functional activities such as bending and lifting. Patient will benefit from skilled physical therapy to improve body mechanics and postural awareness    Patient prognosis is Good.   Patient will benefit from skilled outpatient Physical Therapy to address the deficits stated above and in the chart below, provide patient /family education, and to maximize patientt's level of independence.     Plan of care discussed with patient: Yes  Patient's spiritual, cultural and educational needs considered and patient is agreeable to the plan of care and goals as stated below:     Anticipated Barriers for therapy: scheduling     Medical Necessity is demonstrated by the following  History  Co-morbidities and personal factors that may impact the plan of care Co-morbidities:   none    Personal Factors:   age  coping style  lifestyle     low   Examination  Body Structures and Functions, activity limitations and participation restrictions that may impact the plan of care Body Regions:   back  lower extremities    Body Systems:    gross symmetry  ROM  strength  balance  transfers  transitions    Participation Restrictions:   Bending, lifting     Activity limitations:   Learning and applying knowledge  no deficits    Mobility  no deficits    Self care  no deficits    Domestic Life  no deficits    Community and Social Life  no deficits         low   Clinical Presentation stable and uncomplicated low   Decision Making/ Complexity Score: low     GOALS: Short Term Goals:  4 weeks  1.Report decreased lumbar pain  < / =  5/10  to increase tolerance for lifting objects  2. Increase flexion ROM  to be pain free  in order to perform ADLs without difficulty.  3. Increase strength by 1/3 MMT grade in lower extremities  to increase tolerance for ADL and work activities.  4. Pt to tolerate HEP to improve ROM and independence with ADL's    Long Term Goals: 8 weeks  1.Report decreased lumbar pain < / = 3/10  to increase tolerance for lifting objects  2.Patient goal: improve balance reactions  3.Increase strength to 4+/5 in  lower extremities  to increase tolerance for ADL and work activities.  4. Pt will report at 20% limitation on FOTO  to demonstrate increase in LE function with every day tasks.       PLAN   Plan of care Certification: 12/13/2022 to 02/23/2023.    Outpatient Physical Therapy 1 times weekly for 8 weeks to include the following interventions: Cervical/Lumbar Traction, Manual Therapy, Neuromuscular Re-ed, Patient Education, Self Care, Therapeutic Activities, and Therapeutic Exercise.     Tamica Meek, PT      I CERTIFY THE NEED FOR THESE SERVICES FURNISHED UNDER THIS PLAN OF TREATMENT AND WHILE UNDER MY CARE   Physician's comments:     Physician's Signature: ___________________________________________________

## 2022-12-17 ENCOUNTER — PATIENT MESSAGE (OUTPATIENT)
Dept: NEUROLOGY | Facility: CLINIC | Age: 22
End: 2022-12-17
Payer: COMMERCIAL

## 2023-01-20 ENCOUNTER — OFFICE VISIT (OUTPATIENT)
Dept: NEUROLOGY | Facility: CLINIC | Age: 23
End: 2023-01-20
Payer: COMMERCIAL

## 2023-01-20 ENCOUNTER — TELEPHONE (OUTPATIENT)
Dept: NEUROLOGY | Facility: CLINIC | Age: 23
End: 2023-01-20
Payer: COMMERCIAL

## 2023-01-20 ENCOUNTER — PATIENT MESSAGE (OUTPATIENT)
Dept: NEUROLOGY | Facility: CLINIC | Age: 23
End: 2023-01-20

## 2023-01-20 VITALS
BODY MASS INDEX: 35.01 KG/M2 | SYSTOLIC BLOOD PRESSURE: 120 MMHG | HEIGHT: 62 IN | HEART RATE: 86 BPM | DIASTOLIC BLOOD PRESSURE: 77 MMHG | WEIGHT: 190.25 LBS

## 2023-01-20 DIAGNOSIS — G37.9 DEMYELINATING DISEASE OF CENTRAL NERVOUS SYSTEM, UNSPECIFIED: Primary | ICD-10-CM

## 2023-01-20 DIAGNOSIS — G56.03 CARPAL TUNNEL SYNDROME, BILATERAL: ICD-10-CM

## 2023-01-20 DIAGNOSIS — G56.23 ULNAR NEUROPATHY OF BOTH UPPER EXTREMITIES: Primary | ICD-10-CM

## 2023-01-20 DIAGNOSIS — R20.2 PARESTHESIA: ICD-10-CM

## 2023-01-20 PROCEDURE — 3008F PR BODY MASS INDEX (BMI) DOCUMENTED: ICD-10-PCS | Mod: CPTII,S$GLB,, | Performed by: STUDENT IN AN ORGANIZED HEALTH CARE EDUCATION/TRAINING PROGRAM

## 2023-01-20 PROCEDURE — 99999 PR PBB SHADOW E&M-EST. PATIENT-LVL III: ICD-10-PCS | Mod: PBBFAC,,, | Performed by: STUDENT IN AN ORGANIZED HEALTH CARE EDUCATION/TRAINING PROGRAM

## 2023-01-20 PROCEDURE — 99214 OFFICE O/P EST MOD 30 MIN: CPT | Mod: S$GLB,,, | Performed by: STUDENT IN AN ORGANIZED HEALTH CARE EDUCATION/TRAINING PROGRAM

## 2023-01-20 PROCEDURE — 3074F PR MOST RECENT SYSTOLIC BLOOD PRESSURE < 130 MM HG: ICD-10-PCS | Mod: CPTII,S$GLB,, | Performed by: STUDENT IN AN ORGANIZED HEALTH CARE EDUCATION/TRAINING PROGRAM

## 2023-01-20 PROCEDURE — 3078F DIAST BP <80 MM HG: CPT | Mod: CPTII,S$GLB,, | Performed by: STUDENT IN AN ORGANIZED HEALTH CARE EDUCATION/TRAINING PROGRAM

## 2023-01-20 PROCEDURE — 3008F BODY MASS INDEX DOCD: CPT | Mod: CPTII,S$GLB,, | Performed by: STUDENT IN AN ORGANIZED HEALTH CARE EDUCATION/TRAINING PROGRAM

## 2023-01-20 PROCEDURE — 99999 PR PBB SHADOW E&M-EST. PATIENT-LVL III: CPT | Mod: PBBFAC,,, | Performed by: STUDENT IN AN ORGANIZED HEALTH CARE EDUCATION/TRAINING PROGRAM

## 2023-01-20 PROCEDURE — 99214 PR OFFICE/OUTPT VISIT, EST, LEVL IV, 30-39 MIN: ICD-10-PCS | Mod: S$GLB,,, | Performed by: STUDENT IN AN ORGANIZED HEALTH CARE EDUCATION/TRAINING PROGRAM

## 2023-01-20 PROCEDURE — 3074F SYST BP LT 130 MM HG: CPT | Mod: CPTII,S$GLB,, | Performed by: STUDENT IN AN ORGANIZED HEALTH CARE EDUCATION/TRAINING PROGRAM

## 2023-01-20 PROCEDURE — 3078F PR MOST RECENT DIASTOLIC BLOOD PRESSURE < 80 MM HG: ICD-10-PCS | Mod: CPTII,S$GLB,, | Performed by: STUDENT IN AN ORGANIZED HEALTH CARE EDUCATION/TRAINING PROGRAM

## 2023-01-20 NOTE — PROGRESS NOTES
Chief Complaint and Duration     Numbness and tingling in both feet, resolved.  Now with numbness and tingling on side of her L thigh    History of Present Illness     Zeny Charles is a 22 y.o. female with a history of multiple medical diagnoses including having Covid this past august. Presents for parasthesias.     States she was in [place] for prolonged stay, afterwards got numbness and tingling in both feet and also toes , evaluated by podiatrist. Referred here for parasthesias.    States numbness and tingling in feet resolved. Now with numbness and tingling that is intermittently located on her lateral thigh to knee region on the left. No specific pattern but notices it when she sits down. Does have back pain, no weakness. Does not wake her up from sleep. No bowel or bladder issues.     Works as a .     1/20/23 - here for followup. Continues to have paraesthesias.     Review of patient's allergies indicates:  No Known Allergies  Current Outpatient Medications   Medication Sig Dispense Refill    acetaminophen (TYLENOL) 500 MG tablet Take 500 mg by mouth every 8 (eight) hours as needed for Pain.      cholecalciferol, vitamin D3, 1,250 mcg (50,000 unit) capsule Take 1 capsule (50,000 Units total) by mouth every 7 days. 12 capsule 0    clindamycin phosphate 1% (CLINDAGEL) 1 % gel Apply topically 2 (two) times daily. (Patient not taking: Reported on 3/2/2023) 60 g 5    dextroamphetamine-amphetamine 10 mg Tab Take 1 tablet (10 mg total) by mouth 2 (two) times a day. 60 tablet 0    [START ON 4/14/2023] dextroamphetamine-amphetamine 10 mg Tab Take 1 tablet (10 mg total) by mouth 2 (two) times a day. 60 tablet 0    [START ON 5/14/2023] dextroamphetamine-amphetamine 10 mg Tab Take 1 tablet (10 mg total) by mouth 2 (two) times a day. 60 tablet 0    EScitalopram oxalate (LEXAPRO) 10 MG tablet Take 1 tablet (10 mg total) by mouth once daily. 30 tablet 11    ibuprofen (ADVIL,MOTRIN) 400 MG tablet Take  400 mg by mouth every 4 (four) hours.      tirzepatide (MOUNJARO) 5 mg/0.5 mL PnIj Inject 5 mg into the skin every 7 days. 2 pen 3     No current facility-administered medications for this visit.       Medical History     Past Medical History:   Diagnosis Date    Factor 5 Leiden mutation, heterozygous 9/13/2016    Seizures     1 at the age of 10     Past Surgical History:   Procedure Laterality Date    TONSILLECTOMY  6 years old     Family History   Problem Relation Age of Onset    Hypertension Mother     Irregular menses Mother     Factor V Leiden deficiency Mother     Interstitial cystitis Mother     No Known Problems Father     Factor V Leiden deficiency Sister     Interstitial cystitis Maternal Aunt     Factor V Leiden deficiency Maternal Grandmother     Thyroid disease Maternal Grandmother         hypothyroidism    Mitral valve prolapse Maternal Grandfather     Congenital heart disease Maternal Grandfather         valve    Crohn's disease Paternal Grandmother     Breast cancer Neg Hx     Colon cancer Neg Hx     Ovarian cancer Neg Hx     Arrhythmia Neg Hx     Early death Neg Hx     Heart attacks under age 50 Neg Hx     Pacemaker/defibrilator Neg Hx      Social History     Socioeconomic History    Marital status: Single   Tobacco Use    Smoking status: Never    Smokeless tobacco: Never   Substance and Sexual Activity    Alcohol use: No    Drug use: No    Sexual activity: Never   Social History Narrative    In 12th grade. Lives with Mom and Dad. No smokers. No alcohol, tobacco, illicit drugs. 1 dog.        Exam     Vitals:    01/20/23 0746   BP: 120/77   Pulse: 86      Physical Exam:  General: She is not in acute distress. She is not ill-appearing.   HENT: Normocephalic and atraumatic. Moist mucous membranes.  Eyes: Conjunctivae normal.   Pulmonary: Pulmonary effort is normal.   Abdominal: Abdomen is soft and flat.   Skin: Skin is warm and dry. No rashes.   Psychiatric: Mood normal.        Neurologic Exam   Mental  status: oriented to person, place, and time  Attention: Normal. Concentration: normal.  Speech: speech is normal.  Cranial Nerves: PERRL, EOMI intact, V1-V3 Facial sensation intact. Symmetric facies. Hearing grossly intact. Palate and uvula midline, symmetric. No tongue deviation. Trapezius strength intact.     Motor exam: bulk and tone normal. Strength 5/5 in bilateral upper extremities: deltoids, biceps, triceps, wrist flexion/extension, finger abduction/adduction. Strength 5/5 in bilateral lower extremities: hip flexion/extension, thigh adduction/abduction, knee flexion/extension, dorsiflexion/plantarflexion, foot eversion/inversion.    Reflexes: 2+ in bilateral upper extremities: biceps and brachiaradialis, 2+ in bilateral lower extremities: patellar and achilles  Plantar reflex: normal    Sensory exam: decreased to light touch in region of femoral and lateral femoral cutaneous region    Gait exam: normal  Romberg: negative  Coordination: normal    Tremor: none    Labs and Imaging     Labs: reviewed personally  , TSH nml, A1C 4.8    Imaging: reviewed  Other procedures: reviewed    Assessment and Plan     Problem List Items Addressed This Visit          Neuro    Ulnar neuropathy of both upper extremities - Primary    Relevant Orders    EMG W/ ULTRASOUND AND NERVE CONDUCTION TEST 2 Extremities (Completed)    Carpal tunnel syndrome, bilateral    Relevant Orders    EMG W/ ULTRASOUND AND NERVE CONDUCTION TEST 2 Extremities (Completed)       Other    Paresthesia

## 2023-01-24 ENCOUNTER — PATIENT MESSAGE (OUTPATIENT)
Dept: NEUROLOGY | Facility: CLINIC | Age: 23
End: 2023-01-24

## 2023-01-24 ENCOUNTER — OFFICE VISIT (OUTPATIENT)
Dept: NEUROLOGY | Facility: CLINIC | Age: 23
End: 2023-01-24
Payer: COMMERCIAL

## 2023-01-24 ENCOUNTER — HOSPITAL ENCOUNTER (EMERGENCY)
Facility: HOSPITAL | Age: 23
Discharge: HOME | End: 2023-01-24
Attending: EMERGENCY MEDICINE
Payer: COMMERCIAL

## 2023-01-24 ENCOUNTER — LAB VISIT (OUTPATIENT)
Dept: LAB | Facility: HOSPITAL | Age: 23
End: 2023-01-24
Attending: PSYCHIATRY & NEUROLOGY
Payer: COMMERCIAL

## 2023-01-24 VITALS
DIASTOLIC BLOOD PRESSURE: 82 MMHG | HEART RATE: 101 BPM | WEIGHT: 190 LBS | HEIGHT: 62 IN | BODY MASS INDEX: 34.96 KG/M2 | SYSTOLIC BLOOD PRESSURE: 117 MMHG

## 2023-01-24 VITALS
OXYGEN SATURATION: 98 % | HEART RATE: 101 BPM | DIASTOLIC BLOOD PRESSURE: 88 MMHG | TEMPERATURE: 99 F | RESPIRATION RATE: 18 BRPM | SYSTOLIC BLOOD PRESSURE: 126 MMHG

## 2023-01-24 DIAGNOSIS — M79.10 MUSCLE PAIN: Primary | ICD-10-CM

## 2023-01-24 DIAGNOSIS — M79.10 MUSCLE PAIN: ICD-10-CM

## 2023-01-24 DIAGNOSIS — R20.2 PARESTHESIAS: ICD-10-CM

## 2023-01-24 DIAGNOSIS — M54.50 ACUTE MIDLINE LOW BACK PAIN WITHOUT SCIATICA: Primary | ICD-10-CM

## 2023-01-24 DIAGNOSIS — M62.81 GENERALIZED MUSCLE WEAKNESS: ICD-10-CM

## 2023-01-24 DIAGNOSIS — R45.89 ANXIETY ABOUT HEALTH: ICD-10-CM

## 2023-01-24 DIAGNOSIS — M62.838 MUSCLE SPASM: ICD-10-CM

## 2023-01-24 LAB
ALBUMIN SERPL BCP-MCNC: 4 G/DL (ref 3.5–5.2)
ALP SERPL-CCNC: 58 U/L (ref 55–135)
ALT SERPL W/O P-5'-P-CCNC: 28 U/L (ref 10–44)
ANION GAP SERPL CALC-SCNC: 10 MMOL/L (ref 8–16)
AST SERPL-CCNC: 16 U/L (ref 10–40)
B-HCG UR QL: NEGATIVE
BASOPHILS # BLD AUTO: 0.05 K/UL (ref 0–0.2)
BASOPHILS NFR BLD: 0.5 % (ref 0–1.9)
BILIRUB SERPL-MCNC: 0.4 MG/DL (ref 0.1–1)
BUN SERPL-MCNC: 7 MG/DL (ref 6–20)
CALCIUM SERPL-MCNC: 9.6 MG/DL (ref 8.7–10.5)
CHLORIDE SERPL-SCNC: 104 MMOL/L (ref 95–110)
CK SERPL-CCNC: 46 U/L (ref 20–180)
CO2 SERPL-SCNC: 27 MMOL/L (ref 23–29)
CREAT SERPL-MCNC: 0.7 MG/DL (ref 0.5–1.4)
CRP SERPL-MCNC: 8.6 MG/L (ref 0–8.2)
CTP QC/QA: YES
DIFFERENTIAL METHOD: NORMAL
EOSINOPHIL # BLD AUTO: 0.1 K/UL (ref 0–0.5)
EOSINOPHIL NFR BLD: 0.7 % (ref 0–8)
ERYTHROCYTE [DISTWIDTH] IN BLOOD BY AUTOMATED COUNT: 12.2 % (ref 11.5–14.5)
EST. GFR  (NO RACE VARIABLE): >60 ML/MIN/1.73 M^2
GLUCOSE SERPL-MCNC: 73 MG/DL (ref 70–110)
HCT VFR BLD AUTO: 44.9 % (ref 37–48.5)
HGB BLD-MCNC: 14.8 G/DL (ref 12–16)
IMM GRANULOCYTES # BLD AUTO: 0.02 K/UL (ref 0–0.04)
IMM GRANULOCYTES NFR BLD AUTO: 0.2 % (ref 0–0.5)
LYMPHOCYTES # BLD AUTO: 2.4 K/UL (ref 1–4.8)
LYMPHOCYTES NFR BLD: 23.4 % (ref 18–48)
MCH RBC QN AUTO: 30.8 PG (ref 27–31)
MCHC RBC AUTO-ENTMCNC: 33 G/DL (ref 32–36)
MCV RBC AUTO: 93 FL (ref 82–98)
MONOCYTES # BLD AUTO: 0.9 K/UL (ref 0.3–1)
MONOCYTES NFR BLD: 8.8 % (ref 4–15)
NEUTROPHILS # BLD AUTO: 6.8 K/UL (ref 1.8–7.7)
NEUTROPHILS NFR BLD: 66.4 % (ref 38–73)
NRBC BLD-RTO: 0 /100 WBC
PLATELET # BLD AUTO: 315 K/UL (ref 150–450)
PMV BLD AUTO: 12.4 FL (ref 9.2–12.9)
POTASSIUM SERPL-SCNC: 3.9 MMOL/L (ref 3.5–5.1)
PROT SERPL-MCNC: 7.8 G/DL (ref 6–8.4)
RBC # BLD AUTO: 4.81 M/UL (ref 4–5.4)
SODIUM SERPL-SCNC: 141 MMOL/L (ref 136–145)
TSH SERPL DL<=0.005 MIU/L-ACNC: 0.99 UIU/ML (ref 0.4–4)
WBC # BLD AUTO: 10.29 K/UL (ref 3.9–12.7)

## 2023-01-24 PROCEDURE — 3079F DIAST BP 80-89 MM HG: CPT | Mod: CPTII,S$GLB,, | Performed by: PSYCHIATRY & NEUROLOGY

## 2023-01-24 PROCEDURE — 25000003 PHARM REV CODE 250: Performed by: STUDENT IN AN ORGANIZED HEALTH CARE EDUCATION/TRAINING PROGRAM

## 2023-01-24 PROCEDURE — 1160F PR REVIEW ALL MEDS BY PRESCRIBER/CLIN PHARMACIST DOCUMENTED: ICD-10-PCS | Mod: CPTII,S$GLB,, | Performed by: PSYCHIATRY & NEUROLOGY

## 2023-01-24 PROCEDURE — 3008F BODY MASS INDEX DOCD: CPT | Mod: CPTII,S$GLB,, | Performed by: PSYCHIATRY & NEUROLOGY

## 2023-01-24 PROCEDURE — 3079F PR MOST RECENT DIASTOLIC BLOOD PRESSURE 80-89 MM HG: ICD-10-PCS | Mod: CPTII,S$GLB,, | Performed by: PSYCHIATRY & NEUROLOGY

## 2023-01-24 PROCEDURE — 86235 NUCLEAR ANTIGEN ANTIBODY: CPT | Performed by: PSYCHIATRY & NEUROLOGY

## 2023-01-24 PROCEDURE — 84443 ASSAY THYROID STIM HORMONE: CPT | Performed by: PSYCHIATRY & NEUROLOGY

## 2023-01-24 PROCEDURE — 99215 PR OFFICE/OUTPT VISIT, EST, LEVL V, 40-54 MIN: ICD-10-PCS | Mod: S$GLB,,, | Performed by: PSYCHIATRY & NEUROLOGY

## 2023-01-24 PROCEDURE — 86431 RHEUMATOID FACTOR QUANT: CPT | Performed by: PSYCHIATRY & NEUROLOGY

## 2023-01-24 PROCEDURE — 86038 ANTINUCLEAR ANTIBODIES: CPT | Performed by: PSYCHIATRY & NEUROLOGY

## 2023-01-24 PROCEDURE — 3008F PR BODY MASS INDEX (BMI) DOCUMENTED: ICD-10-PCS | Mod: CPTII,S$GLB,, | Performed by: PSYCHIATRY & NEUROLOGY

## 2023-01-24 PROCEDURE — 85652 RBC SED RATE AUTOMATED: CPT | Performed by: PSYCHIATRY & NEUROLOGY

## 2023-01-24 PROCEDURE — 1159F PR MEDICATION LIST DOCUMENTED IN MEDICAL RECORD: ICD-10-PCS | Mod: CPTII,S$GLB,, | Performed by: PSYCHIATRY & NEUROLOGY

## 2023-01-24 PROCEDURE — 99284 EMERGENCY DEPT VISIT MOD MDM: CPT | Mod: ,,, | Performed by: EMERGENCY MEDICINE

## 2023-01-24 PROCEDURE — 3074F PR MOST RECENT SYSTOLIC BLOOD PRESSURE < 130 MM HG: ICD-10-PCS | Mod: CPTII,S$GLB,, | Performed by: PSYCHIATRY & NEUROLOGY

## 2023-01-24 PROCEDURE — 82550 ASSAY OF CK (CPK): CPT | Performed by: PSYCHIATRY & NEUROLOGY

## 2023-01-24 PROCEDURE — 80053 COMPREHEN METABOLIC PANEL: CPT | Performed by: PSYCHIATRY & NEUROLOGY

## 2023-01-24 PROCEDURE — 36415 COLL VENOUS BLD VENIPUNCTURE: CPT | Performed by: PSYCHIATRY & NEUROLOGY

## 2023-01-24 PROCEDURE — 81025 URINE PREGNANCY TEST: CPT | Performed by: STUDENT IN AN ORGANIZED HEALTH CARE EDUCATION/TRAINING PROGRAM

## 2023-01-24 PROCEDURE — 99283 EMERGENCY DEPT VISIT LOW MDM: CPT

## 2023-01-24 PROCEDURE — 99999 PR PBB SHADOW E&M-EST. PATIENT-LVL III: CPT | Mod: PBBFAC,,, | Performed by: PSYCHIATRY & NEUROLOGY

## 2023-01-24 PROCEDURE — 1159F MED LIST DOCD IN RCRD: CPT | Mod: CPTII,S$GLB,, | Performed by: PSYCHIATRY & NEUROLOGY

## 2023-01-24 PROCEDURE — 83874 ASSAY OF MYOGLOBIN: CPT | Performed by: PSYCHIATRY & NEUROLOGY

## 2023-01-24 PROCEDURE — 86200 CCP ANTIBODY: CPT | Performed by: PSYCHIATRY & NEUROLOGY

## 2023-01-24 PROCEDURE — 99215 OFFICE O/P EST HI 40 MIN: CPT | Mod: S$GLB,,, | Performed by: PSYCHIATRY & NEUROLOGY

## 2023-01-24 PROCEDURE — 86140 C-REACTIVE PROTEIN: CPT | Performed by: PSYCHIATRY & NEUROLOGY

## 2023-01-24 PROCEDURE — 85025 COMPLETE CBC W/AUTO DIFF WBC: CPT | Performed by: PSYCHIATRY & NEUROLOGY

## 2023-01-24 PROCEDURE — 99284 PR EMERGENCY DEPT VISIT,LEVEL IV: ICD-10-PCS | Mod: ,,, | Performed by: EMERGENCY MEDICINE

## 2023-01-24 PROCEDURE — 99999 PR PBB SHADOW E&M-EST. PATIENT-LVL III: ICD-10-PCS | Mod: PBBFAC,,, | Performed by: PSYCHIATRY & NEUROLOGY

## 2023-01-24 PROCEDURE — 1160F RVW MEDS BY RX/DR IN RCRD: CPT | Mod: CPTII,S$GLB,, | Performed by: PSYCHIATRY & NEUROLOGY

## 2023-01-24 PROCEDURE — 3074F SYST BP LT 130 MM HG: CPT | Mod: CPTII,S$GLB,, | Performed by: PSYCHIATRY & NEUROLOGY

## 2023-01-24 PROCEDURE — 82085 ASSAY OF ALDOLASE: CPT | Performed by: PSYCHIATRY & NEUROLOGY

## 2023-01-24 RX ORDER — IBUPROFEN 400 MG/1
800 TABLET ORAL
Status: DISCONTINUED | OUTPATIENT
Start: 2023-01-24 | End: 2023-01-24

## 2023-01-24 RX ORDER — METHOCARBAMOL 500 MG/1
500 TABLET, FILM COATED ORAL
Status: COMPLETED | OUTPATIENT
Start: 2023-01-24 | End: 2023-01-24

## 2023-01-24 RX ORDER — METHYLPREDNISOLONE 4 MG/1
TABLET ORAL
Qty: 21 EACH | Refills: 0 | Status: SHIPPED | OUTPATIENT
Start: 2023-01-24 | End: 2023-02-09

## 2023-01-24 RX ORDER — ACETAMINOPHEN 500 MG
1000 TABLET ORAL
Status: COMPLETED | OUTPATIENT
Start: 2023-01-24 | End: 2023-01-24

## 2023-01-24 RX ORDER — LIDOCAINE 50 MG/G
1 PATCH TOPICAL
Status: DISCONTINUED | OUTPATIENT
Start: 2023-01-24 | End: 2023-01-24 | Stop reason: HOSPADM

## 2023-01-24 RX ADMIN — METHOCARBAMOL TABLETS 500 MG: 500 TABLET, COATED ORAL at 04:01

## 2023-01-24 RX ADMIN — LIDOCAINE 1 PATCH: 50 PATCH CUTANEOUS at 04:01

## 2023-01-24 RX ADMIN — ACETAMINOPHEN 1000 MG: 500 TABLET ORAL at 04:01

## 2023-01-24 NOTE — PROGRESS NOTES
[facility] NEUROLOGY  [facility], [place]    Date: 1/24/23  Patient Name: Zeny Charles   MRN: [id number]   PCP: Chris Rendon  Referring Provider: No ref. provider found    Chief Complaint:  Numbness, pain  Subjective:     This patient has been evaluated by [facility] neurology in the past.  She also has outstanding elements of neurological workup from another provider.  Extensive chart review was conducted prior to today's encounter including emergency department visit earlier today.       HPI:   Ms. Zeny Charles is a 22 y.o. RH female with past medical history and current problem list as below presenting for evaluation of multiple complaints.    Patient has a several week history of numbness limited to the 4th and 5th digit and inferior surface of the palm in a symmetric fashion in the bilateral hands.  Patient endorses weakness.  This complaint has been evaluated by a separate [facility] neurologist and there is a pending MRI of the cervical spine and EMG/NCV for further evaluation.    Patient endorses several week history of bilateral decreased sensation about the torso along dermatomal levels T4-T12.  No central back pain.  No electric or shooting pains in this region.  No problems breathing.  No abdominal pains.  No reported injuries to the back.  No recent motor vehicle accidents.  No associated rash or skin lesions.    Several month history of low back pain and transient numbness in the right thigh.  Patient notes that she awoke this morning with excruciating pain in the low back without radiation which prompted visit to emergency department; no significant findings on exam and discharged with Medrol dose pack.  The patient had MRI lumbar spine completed 12/01/2022 which was largely unremarkable outside of a small bulging disc at L4-5 without narrowing of the canal or foraminal spaces.     Patient has discomfort in the bilateral ears every morning that she describes as painful.  No swelling  or redness of the external ear has been noted.  No discharge from either ear has been noted.    Patient is accompanied by her mother at the time of today's encounter.  She expresses significant concern over her daughter's condition and wants more done.    Patient was questioned repeatedly about outside stressors but denies any identifiable sources.  She is in nursing school.  Denies tick or insect bites of unknown origin.  She does describe an unusual rash suffered during what they believed to be COVID-19 infection August 2022 which left mild scarring.  No known history of autoimmune disorders.  No unexplained fevers or weight loss.  Endorses weakness in the arms and legs.  Endorses muscle soreness diffusely throughout the body most prominent in the low back.    PAST MEDICAL HISTORY:  Past Medical History:   Diagnosis Date    Factor 5 Leiden mutation, heterozygous 9/13/2016    Seizures     1 at the age of 10     Patient Active Problem List   Diagnosis    Factor 5 Leiden mutation, heterozygous    Seizure disorder    Class 2 obesity due to excess calories without serious comorbidity with body mass index (BMI) of 37.0 to 37.9 in adult    ADHD (attention deficit hyperactivity disorder), inattentive type    Severe obesity (BMI 35.0-39.9) with comorbidity    Decreased strength, endurance, and mobility    Impaired sensation    Impaired functional mobility, balance, and endurance     PAST SURGICAL HISTORY:  Past Surgical History:   Procedure Laterality Date    TONSILLECTOMY  6 years old       CURRENT MEDS:  Current Outpatient Medications   Medication Sig Dispense Refill    dextroamphetamine-amphetamine 10 mg Tab Take 1 tablet (10 mg total) by mouth 2 (two) times daily. 60 tablet 0    dextroamphetamine-amphetamine 10 mg Tab Take 1 tablet (10 mg total) by mouth 2 (two) times daily. 60 tablet 0    dextroamphetamine-amphetamine 10 mg Tab Take 1 tablet (10 mg total) by mouth 2 (two) times daily. 60 tablet 0    methylPREDNISolone  (MEDROL DOSEPACK) 4 mg tablet use as directed 21 each 0    pulse oximeter (PULSE OXIMETER) device by Apply Externally route 2 (two) times a day. Use twice daily at 8 AM and 3 PM and record the value in [employer] as directed. 1 each 0    gabapentin (NEURONTIN) 300 MG capsule Take 1 capsule (300 mg total) by mouth 3 (three) times daily. Start 1 capsule 300mg nightly x 1 week and titrate up to TID as tolerated for neuropathic pain (Patient not taking: Reported on 1/24/2023) 90 capsule 5     No current facility-administered medications for this visit.       ALLERGIES:  Review of patient's allergies indicates:  No Known Allergies    FAMILY HISTORY:  Family History   Problem Relation Age of Onset    Hypertension Mother     Irregular menses Mother     Factor V Leiden deficiency Mother     Interstitial cystitis Mother     No Known Problems Father     Factor V Leiden deficiency Sister     Interstitial cystitis Maternal Aunt     Factor V Leiden deficiency Maternal Grandmother     Thyroid disease Maternal Grandmother         hypothyroidism    Mitral valve prolapse Maternal Grandfather     Congenital heart disease Maternal Grandfather         valve    Crohn's disease Paternal Grandmother     Breast cancer Neg Hx     Colon cancer Neg Hx     Ovarian cancer Neg Hx     Arrhythmia Neg Hx     Early death Neg Hx     Heart attacks under age 50 Neg Hx     Pacemaker/defibrilator Neg Hx        SOCIAL HISTORY:  Social History     Tobacco Use    Smoking status: Never    Smokeless tobacco: Never   Substance Use Topics    Alcohol use: No    Drug use: No       Review of Systems:  Gen: no fever, no chills, (+) generalized feeling of weakness   HEENT: no double vision, no blurred vision, no eye pain, no eye exudates. no nasal congestion,   no traumatic injury of head, no neck pain, no neck stiffness. no photophobia or phonophobia at this time. ?    Heart: no chest pain, no SOB    Lungs: no SOB, no cough    MSK: (+) weakness of legs, intact ROM,  "(+) back pain  ABD: no abd pain, no N/V/D/C, no difficulty with defecation.    Extremities: No leg pain, no edema.       Objective:     Vitals:    01/24/23 1317   BP: 117/82   BP Location: Right arm   Patient Position: Sitting   BP Method: Small (Automatic)   Pulse: 101   Weight: 86.2 kg (190 lb)   Height: 5' 2" (1.575 m)       General: female in NAD, alert and awake, Aox3, well groomed, did not speak unless directly addressed for an answer during the encounter. ?    ? ?    HEENT: Head is NC/AT EOMI, pupil size: 4 mm B/L, no nystagmus noted; hearing grossly intact b/l. Mucous membrane moist, uvula midline, no pharyngeal erythema, exudates or discharges.     Bilateral tympanic membranes visible, clear, no fluid level, no ruptures.  External auditory meatus without obstruction or obvious inflammation     Neck: Supple. no nuchal rigidity.      Cardiovascular: well perfused, no cyanosis        Respiratory: Symmetric chest rise noted       Musculoskeletal: Muscle tone noted to be adequate for patient age, muscle mass is WNL. No spontaneous movements or fasciculations noted during this examination.      Patient endorses pain with even light palpation of the midline lower back and adjacent musculature.  Pain above SI joints bilaterally.  No tissue texture changes/muscle spasm or tightness were noted     Extremities: No pedal edema or calf tenderness. No cogwheel rigidity noted on B/L UE extremities.      Tinel sign negative at bilateral wrist and elbows     Neurological Examination.    Mental status: AA&O x3; Affect/mood is euthymic/congruent; no aphasia noted during examination. Patient answers simple questions appropriately & follows simple commands; no dysarthria or expressive aphasia; no idalia-neglect or extinction. Vocabulary/word finding: excellent.       Cranial Nerves: II-XII grossly intact. visual fields intact to confrontation, EOMI, no nystagmus, PERRLA,?facial muscles symmetric and no facial droop noted, patient " hearing is grossly intact b/l, ?facial sensation to sharp and light touch grossly intact B/L. Patient smiles, frowns, closes eyes ?forcefully uneventfully. Uvula midline and no difficulty with pronunciation. No SCM/trapezius/muscle of mastication weakness noted B/L. Patient has adequate control of tongue and may protrude it and move it adequately.       Muscle Function:  Giveaway strength was noted during initial portions of strength exam, but with repeated coaching, the patient was found to have full and symmetric strength throughout.  Tone WNL and Muscle bulk WNL. Left elbow flexors/extensors (5/5), Left shoulder (5/5); left Finger flexor/extensors (5/5). Right elbow flexors/extensors (5/5). Right shoulder abduction/flexion (5/5), Right finger flexors/extensors (5/5). Left LE hip flexion/extension (5/5), Left Knee flexion/extension (5/5) and Left ankle flexion/extension/Eversion/inversion (5/5). Right LE hip flexion/extension (5/5), Right Knee flexion/extension (5/5) and ankle flexion/extension/Eversion/inversion (5/5).       Sensory: Intact to light touch throughout     Reflexes: Left and Right biceps, triceps, brachioradialis are 2+/4. patellar 2+/4 on Left and 2+/4 on Right, B/L Achilles 2+/4     Coordination: no dysmetria (finger to nose negative)     Gait: adequate casual gait with stride length and arm swing WNL. Tandem gait and walking on toes and heels are WNL     Other:  12/01/2022 MRI lumbar spine without:  FINDINGS:  The alignment of the lumbar spine is normal.  The vertebral body heights are well maintained.  There is mild disc space narrowing and disc desiccation at L4-5.  No evidence of malignant bone marrow replacement process.  No fracture, no osseous lesions.  The conus medullaris terminates in good position.  L1-L2: Unremarkable  L2-L3: Unremarkable  L3-L4: Unremarkable  L4-5: Diffuse disc bulge, posterior central annular fissure there is no canal stenosis or foraminal narrowing.  L5-S1:  "Unremarkable  The upper sacrum and upper sacroiliac joints appear normal.  Impression:  Degenerative disc disease L4-5.  Mild disc bulge and subtle posterior central annular fissure noted, no resulting canal stenosis or foraminal narrowing."      Assessment:   Zeny Charles is a 22 y.o. female presenting for evaluation of multiple complaints.  Bilateral hand sensory changes are fairly localized with no abnormalities on exam.  Planned EMG/NCV may be helpful with further case definition.    Sensory symptoms described in the patient's mid section are somewhat unusual and poorly defined.    Patient reports severe back pain with relatively normal MRI lumbar spine and no palpable abnormalities on physical exam.  After extensive conversation, I instructed the patient to proceed with steroid pack provided by emergency department after she completes planned labs today.    This case does not easily align with a single lesion, constellation of lesions, or named syndromes in the neurological system.  We will initiate screening labs geared toward inflammatory and myopathic processes.  The patient has upcoming MRI cervical spine and EMG/NCV through another member of the neurology team; the patient was encouraged to proceed with these planned studies for further case definition.  If no significant findings are identified in this battery of testing, it is unlikely there would be additional avenues for workup from a neurological perspective given current exam and case specifics.    Plan:     Problem List Items Addressed This Visit    None  Visit Diagnoses       Muscle pain    -  Primary    Relevant Orders    Autoinflammatory and Autoimmunity Syndromes Panel    CK    Comprehensive Metabolic Panel    CBC Auto Differential (Completed)    Sedimentation rate    TSH (Completed)    YOLA Screen w/Reflex    Aldolase    Myoglobin, Serum    C-reactive protein    Rheumatoid factor    Cyclic citrul peptide antibody, IgG    Sjogrens syndrome-A " extractable nuclear antibody    Muscle spasm        Relevant Orders    Autoinflammatory and Autoimmunity Syndromes Panel    CK    Comprehensive Metabolic Panel    CBC Auto Differential (Completed)    Sedimentation rate    TSH (Completed)    YOLA Screen w/Reflex    Aldolase    Myoglobin, Serum    C-reactive protein    Rheumatoid factor    Cyclic citrul peptide antibody, IgG    Sjogrens syndrome-A extractable nuclear antibody    Paresthesias        Relevant Orders    Autoinflammatory and Autoimmunity Syndromes Panel    CK    Comprehensive Metabolic Panel    CBC Auto Differential (Completed)    Sedimentation rate    TSH (Completed)    YOLA Screen w/Reflex    Aldolase    Myoglobin, Serum    C-reactive protein    Rheumatoid factor    Cyclic citrul peptide antibody, IgG    Sjogrens syndrome-A extractable nuclear antibody    Generalized muscle weakness        Relevant Orders    Autoinflammatory and Autoimmunity Syndromes Panel    CK    Comprehensive Metabolic Panel    CBC Auto Differential (Completed)    Sedimentation rate    TSH (Completed)    YOLA Screen w/Reflex    Aldolase    Myoglobin, Serum    C-reactive protein    Rheumatoid factor    Cyclic citrul peptide antibody, IgG    Sjogrens syndrome-A extractable nuclear antibody    Anxiety about health              - labs as above   - EMG/NCV for further case definition as ordered by established neurologist   - will follow-up with planned MRI cervical spine results  - patient plans to continue routine follow-up with her established provider, Dr. Marcela Rendon, but was encouraged to reach out to our clinic in the future if needed.    I spent a total of 60 minutes on the day of the visit. This includes face to face time and non-face to face time preparing to see the patient (eg, review of tests), obtaining and/or reviewing separately obtained history, documenting clinical information in the electronic or other health record, independently interpreting results and communicating  results to the patient/family/caregiver, or care coordinator.    A dictation device was used to produce this document. Use of such devices sometimes results in grammatical errors or replacement of words that sound similarly.    Bryan Nieves, DO

## 2023-01-24 NOTE — LETTER
January 24, 2023      [facility]  [address]  [facility] [address]  Phone: [phone]  Fax: [phone]       Patient: Zeny Charles  YOB: 2000  Date of Visit: 01/24/2023    To Whom It May Concern:    Zeny Charles was escorted by Mrs. Charles to [facility] and under our care on 01/24/2023. Please excuse her absence from work.  If you have any questions or concerns, or if I can be of further assistance, please do not hesitate to contact my office.    Sincerely,        Bryan Nieves, DO

## 2023-01-24 NOTE — DISCHARGE INSTRUCTIONS
Please return to the emergency department if you began to develop symptoms including urinary or fecal incontinence, numbness in the perineal/rectal area or if you develop lower extremity weakness

## 2023-01-24 NOTE — ED PROVIDER NOTES
Encounter Date: 1/24/2023       History     Chief Complaint   Patient presents with    Back Pain     Pt c/o lower back pain that woke her up out of her sleep tonight. Pt states she has a bulging disc. Denies any recent falls or trauma.      Patient is a 22-year-old female with a past medical history of factor 5 leiden mutation presenting to the ED for acute worsening of lower midline back pain.  She does have a known disc herniation at L4-L5.  She was awoken from early secondary to pain that she rates 10/10.  Has taken ibuprofen without relief.  No recent/new trauma.  Denies urinary or bowel incontinence, saddle anesthesia or lower extremity weakness.  She does have occasional issues with ambulation secondary to pain.    Review of patient's allergies indicates:  No Known Allergies  Past Medical History:   Diagnosis Date    Factor 5 Leiden mutation, heterozygous 9/13/2016    Seizures     1 at the age of 10     Past Surgical History:   Procedure Laterality Date    TONSILLECTOMY  6 years old     Family History   Problem Relation Age of Onset    Hypertension Mother     Irregular menses Mother     Factor V Leiden deficiency Mother     Interstitial cystitis Mother     No Known Problems Father     Factor V Leiden deficiency Sister     Interstitial cystitis Maternal Aunt     Factor V Leiden deficiency Maternal Grandmother     Thyroid disease Maternal Grandmother         hypothyroidism    Mitral valve prolapse Maternal Grandfather     Congenital heart disease Maternal Grandfather         valve    Crohn's disease Paternal Grandmother     Breast cancer Neg Hx     Colon cancer Neg Hx     Ovarian cancer Neg Hx     Arrhythmia Neg Hx     Early death Neg Hx     Heart attacks under age 50 Neg Hx     Pacemaker/defibrilator Neg Hx      Social History     Tobacco Use    Smoking status: Never    Smokeless tobacco: Never   Substance Use Topics    Alcohol use: No    Drug use: No     Review of Systems   Constitutional:  Negative for  activity change, chills and fever.   HENT:  Negative for congestion, ear pain and sore throat.    Respiratory:  Negative for shortness of breath and stridor.    Cardiovascular:  Negative for chest pain and palpitations.   Gastrointestinal:  Negative for abdominal pain, nausea and vomiting.   Genitourinary:  Negative for dysuria and urgency.   Musculoskeletal:  Positive for back pain.   Skin:  Negative for rash.   Neurological:  Negative for dizziness, syncope, weakness and headaches.   Hematological:  Does not bruise/bleed easily.     Physical Exam     Initial Vitals [01/24/23 0336]   BP Pulse Resp Temp SpO2   126/88 101 18 98.5 °F (36.9 °C) 98 %      MAP       --         Physical Exam    Nursing note and vitals reviewed.  Constitutional: She appears well-developed. She is not diaphoretic. She is Obese . No distress.   Well-appearing.  Speaking full sentences.  No acute distress.   HENT:   Head: Normocephalic and atraumatic.   Right Ear: External ear normal.   Left Ear: External ear normal.   Neck: Neck supple.   Cardiovascular:  Normal rate, regular rhythm, normal heart sounds and intact distal pulses.           Pulmonary/Chest: Breath sounds normal. No respiratory distress. She has no wheezes. She has no rhonchi. She has no rales.   Abdominal: Abdomen is soft. She exhibits no distension. There is no abdominal tenderness. There is no rebound and no guarding.   Musculoskeletal:      Cervical back: Neck supple.      Comments: Midline lumbar tenderness to palpation at L4-L5.     Neurological: She is alert and oriented to person, place, and time. GCS score is 15. GCS eye subscore is 4. GCS verbal subscore is 5. GCS motor subscore is 6.   Cranial nerves 2-12 intact.  Intact motor/strength and sensation to upper and lower extremities bilaterally.   Skin: Skin is warm. Capillary refill takes less than 2 seconds. No rash noted.   Psychiatric: She has a normal mood and affect.       ED Course   Procedures  Labs Reviewed    POCT URINE PREGNANCY          Imaging Results    None          Medications   acetaminophen tablet 1,000 mg (1,000 mg Oral Given 1/24/23 6535)   methocarbamoL tablet 500 mg (500 mg Oral Given 1/24/23 0435)     Medical Decision Making:   History:   I obtained history from: someone other than patient.  Old Medical Records: I decided to obtain old medical records.  Old Records Summarized: records from clinic visits.       <> Summary of Records:   Patient had previously been following with Neurology for upper extremity paresthesias.  Initial Assessment:   Emergent evaluation of back pain.  She is afebrile and hemodynamically stable.  Differential Diagnosis:   Disc herniation, lumbar muscle strain  ED Management:  Additionally considered cauda equina, however she has no neurologic deficits.  Plan for outpatient Solu-Medrol pack and referral to spine clinic.  Discussed red flag symptoms for back pain and strict ED return precautions, and she expressed understanding.                        Clinical Impression:   Final diagnoses:  [M54.50] Acute midline low back pain without sciatica (Primary)        ED Disposition Condition    Discharge Stable          ED Prescriptions       Medication Sig Dispense Start Date End Date Auth. Provider    methylPREDNISolone (MEDROL DOSEPACK) 4 mg tablet use as directed 21 each 1/24/2023 2/14/2023 Royal Gamboa MD          Follow-up Information       Follow up With Specialties Details Why Contact Info    [facility] 7th Fl Neurosurgery Call in 1 day If symptoms worsen [address]  [facility] [id number]  [phone]             Royal Gamboa MD  Resident  01/24/23 0706

## 2023-01-25 ENCOUNTER — PATIENT MESSAGE (OUTPATIENT)
Dept: FAMILY MEDICINE | Facility: CLINIC | Age: 23
End: 2023-01-25
Payer: COMMERCIAL

## 2023-01-25 LAB
ALDOLASE SERPL-CCNC: 2.8 U/L (ref 1.2–7.6)
ANA SER QL IF: NORMAL
ANTI-SSA ANTIBODY: 0.09 RATIO (ref 0–0.99)
ANTI-SSA INTERPRETATION: NEGATIVE
CCP AB SER IA-ACNC: <0.5 U/ML
ERYTHROCYTE [SEDIMENTATION RATE] IN BLOOD BY PHOTOMETRIC METHOD: 32 MM/HR (ref 0–36)
RHEUMATOID FACT SERPL-ACNC: <13 IU/ML (ref 0–15)

## 2023-01-26 LAB — MYOGLOBIN SERPL-MCNC: 27 MCG/L

## 2023-01-27 ENCOUNTER — TELEPHONE (OUTPATIENT)
Dept: OBSTETRICS AND GYNECOLOGY | Facility: CLINIC | Age: 23
End: 2023-01-27
Payer: COMMERCIAL

## 2023-01-27 NOTE — TELEPHONE ENCOUNTER
Spoke with pt mother in regards to appointment time being move up . Pts  mother accepts 830 am on 2/2.

## 2023-01-31 ENCOUNTER — CLINICAL SUPPORT (OUTPATIENT)
Dept: REHABILITATION | Facility: HOSPITAL | Age: 23
End: 2023-01-31
Attending: STUDENT IN AN ORGANIZED HEALTH CARE EDUCATION/TRAINING PROGRAM
Payer: COMMERCIAL

## 2023-01-31 DIAGNOSIS — R68.89 DECREASED STRENGTH, ENDURANCE, AND MOBILITY: Primary | ICD-10-CM

## 2023-01-31 DIAGNOSIS — Z74.09 IMPAIRED FUNCTIONAL MOBILITY, BALANCE, AND ENDURANCE: ICD-10-CM

## 2023-01-31 DIAGNOSIS — Z74.09 DECREASED STRENGTH, ENDURANCE, AND MOBILITY: Primary | ICD-10-CM

## 2023-01-31 DIAGNOSIS — R20.1 IMPAIRED SENSATION: ICD-10-CM

## 2023-01-31 DIAGNOSIS — R53.1 DECREASED STRENGTH, ENDURANCE, AND MOBILITY: Primary | ICD-10-CM

## 2023-01-31 PROCEDURE — 97140 MANUAL THERAPY 1/> REGIONS: CPT | Mod: PN

## 2023-01-31 PROCEDURE — 97112 NEUROMUSCULAR REEDUCATION: CPT | Mod: PN

## 2023-01-31 PROCEDURE — 97110 THERAPEUTIC EXERCISES: CPT | Mod: PN

## 2023-01-31 NOTE — PROGRESS NOTES
[facility]   Physical Therapy Treatment Note     Name: Zeny Charles  Clinic Number: [id number]    Therapy Diagnosis:   Encounter Diagnoses   Name Primary?    Decreased strength, endurance, and mobility Yes    Impaired sensation     Impaired functional mobility, balance, and endurance      Physician: Marcela Rendon MD    Visit Date: 1/31/2023    Physician Orders: PT Eval and Treat   Medical Diagnosis from Referral:   G57.10 (ICD-10-CM) - Meralgia paresthetica, unspecified laterality   M54.16 (ICD-10-CM) - Lumbar radiculopathy, chronic      Evaluation Date: 12/13/2022  Authorization Period Expiration: 12/13/2023  Plan of Care Expiration: 02/21/2023  Progress Note Due: 2/28/2023  Visit # / Visits authorized: 1/ 20  FOTO: 2/5     Precautions: Standard       PTA Visit #: 0/5     Time In: 3:00  Time Out: 3:53  Total Billable Time: 53 minutes    SUBJECTIVE     Pt reports: Her back has been worse and the numbness as spread to her fingers. If she does more activity she will get numbness into her stomach and her back. The back is localized to her lower back. She reports that she has not been doing her exercises because [place] has [place] the last [place]. She has been drinking gabapentin to help with the N/T and drinks it 3 times a day for 1 week straight. The numbness in her hands does not radiate down her arms and the numbness in her legs is around the inside of her thigh. She denies changes in bowel/bladder.   She was not compliant with home exercise program.  Response to previous treatment: 1st treatment  Functional change: nothing    Pain: 0/10 (pain); (8/10) N/T  Location: right back , buttocks , and feet      OBJECTIVE     Objective Measures updated at progress report unless specified.     1/31/23:  Lumbar Range of Motion:     Limitations Pain   Flexion Fingerprints to toes    +      Extension 60    +         Left Side Bending WFL +         Right Side Bending WFL -              "  Lower Extremity Strength  Right LE   Left LE     Quadriceps: 3+/5 Quadriceps: 4/5   Hamstrings: 3+/5 Hamstrings: 3+/5   Iliospoas: 3+/5 Iliospoas: 3+/5   Hip extension:  3/5 Hip extension: 3+/5   Hip abduction: 4-/5 Hip abduction: 4/5   Ankle dorsiflexion: 4/5 Ankle dorsiflexion: 4/5   Ankle plantarflexion: 3/5 Ankle plantarflexion: 3/5      Sensation: intact, altered thought R L4-L5-S1, L L4     Reflexes:  -Patellar (L3-L4): 2+ bilaterally  -Achilles (S1): 2+ bilaterally   - bicep 2+ bilaterally  - triceps 2+ bilaterally      Special Tests:  -SLR Test: + on R,  +on L  -Slump Test: + on L     SI Special Tests:   Distraction: -  Compression: +  SI thigh thrust: -  Sacral thrust: +     Joint Mobility: TTP, guarded throughout lumbar spine  -Segmental mobility testing: guarded with  in prone for extension        Treatment     Zeny received the treatments listed below:      therapeutic exercises to develop strength, posture, and core stabilization for 10 minutes including:    Clams 2x10 ea  Straight leg raise 2x10 ea   Side lying hip abduction 2x10 ea    manual therapy techniques: Joint mobilizations were applied to the: low back  for 20 minutes, including:  reassessment    neuromuscular re-education activities to improve: Coordination, Posture, and motor control  for 25 minutes. The following activities were included:    Posterior pelvic tilt  with TrA activation 5"x20  Bridges 2x10  Hooklying marches 2x10 each leg  MET for right posterior innominate       Patient Education and Home Exercises     Home Exercises Provided and Patient Education Provided     Education provided:   - importance of performing Home exercise program     Written Home Exercises Provided: yes. Exercises were reviewed and Zeny was able to demonstrate them prior to the end of the session.  Zeny demonstrated good  understanding of the education provided. See EMR under Patient Instructions for exercises provided during therapy " sessions    ASSESSMENT     Zeny was seen for her first follow up since her initial evaluation. Continues to present with decrease in lumbar range of motion, decrease in bilateral lower extremity strength, possible SIJ involvement, as well as neural involvements. She required moderate cueing for proper motor control and muscle activation with exercises. She was able to complete all exercises without increase in symptoms during or after treatment session.     Zeny Is not progressing well towards her goals.   Pt prognosis is Fair.     Pt will continue to benefit from skilled outpatient physical therapy to address the deficits listed in the problem list box on initial evaluation, provide pt/family education and to maximize pt's level of independence in the home and community environment.     Pt's spiritual, cultural and educational needs considered and pt agreeable to plan of care and goals.     Anticipated barriers to physical therapy: scheduling     GOALS: Short Term Goals:  4 weeks  1.Report decreased lumbar pain  < / =  5/10  to increase tolerance for lifting objects progressing, not met  2. Increase flexion ROM to be pain free  in order to perform ADLs without difficulty. Progressing, not met  3. Increase strength by 1/3 MMT grade in lower extremities  to increase tolerance for ADL and work activities. Progressing, not met  4. Pt to tolerate HEP to improve ROM and independence with ADL's Progressing, not met     Long Term Goals: 8 weeks  1.Report decreased lumbar pain < / = 3/10  to increase tolerance for lifting objects Progressing, not met  2.Patient goal: improve balance reactions Progressing, not met  3.Increase strength to 4+/5 in  lower extremities  to increase tolerance for ADL and work activities. Progressing, not met  4. Pt will report at 20% limitation on FOTO  to demonstrate increase in LE function with every day tasks.  Progressing, not met    PLAN     Continue to improve core, glute, and hip  strength    Maya Noble, PT,DPT  01/31/2023

## 2023-02-01 ENCOUNTER — PATIENT MESSAGE (OUTPATIENT)
Dept: NEUROLOGY | Facility: CLINIC | Age: 23
End: 2023-02-01
Payer: COMMERCIAL

## 2023-02-01 ENCOUNTER — HOSPITAL ENCOUNTER (OUTPATIENT)
Dept: RADIOLOGY | Facility: HOSPITAL | Age: 23
Discharge: HOME | End: 2023-02-01
Attending: STUDENT IN AN ORGANIZED HEALTH CARE EDUCATION/TRAINING PROGRAM
Payer: COMMERCIAL

## 2023-02-01 DIAGNOSIS — G37.9 DEMYELINATING DISEASE OF CENTRAL NERVOUS SYSTEM, UNSPECIFIED: ICD-10-CM

## 2023-02-01 PROCEDURE — 72141 MRI NECK SPINE W/O DYE: CPT | Mod: 26,,, | Performed by: RADIOLOGY

## 2023-02-01 PROCEDURE — 72141 MRI NECK SPINE W/O DYE: CPT | Mod: TC

## 2023-02-01 PROCEDURE — 72141 MRI CERVICAL SPINE WITHOUT CONTRAST: ICD-10-PCS | Mod: 26,,, | Performed by: RADIOLOGY

## 2023-02-02 ENCOUNTER — PATIENT MESSAGE (OUTPATIENT)
Dept: NEUROLOGY | Facility: CLINIC | Age: 23
End: 2023-02-02
Payer: COMMERCIAL

## 2023-02-03 ENCOUNTER — PROCEDURE VISIT (OUTPATIENT)
Dept: NEUROLOGY | Facility: CLINIC | Age: 23
End: 2023-02-03
Payer: COMMERCIAL

## 2023-02-03 ENCOUNTER — LAB VISIT (OUTPATIENT)
Dept: LAB | Facility: HOSPITAL | Age: 23
End: 2023-02-03
Attending: STUDENT IN AN ORGANIZED HEALTH CARE EDUCATION/TRAINING PROGRAM
Payer: COMMERCIAL

## 2023-02-03 DIAGNOSIS — G37.9 DEMYELINATING DISEASE: Primary | ICD-10-CM

## 2023-02-03 DIAGNOSIS — G56.03 CARPAL TUNNEL SYNDROME, BILATERAL: ICD-10-CM

## 2023-02-03 DIAGNOSIS — M79.10 MUSCLE PAIN: ICD-10-CM

## 2023-02-03 DIAGNOSIS — M62.838 MUSCLE SPASM: ICD-10-CM

## 2023-02-03 DIAGNOSIS — R20.2 PARESTHESIAS: ICD-10-CM

## 2023-02-03 DIAGNOSIS — M62.81 GENERALIZED MUSCLE WEAKNESS: ICD-10-CM

## 2023-02-03 DIAGNOSIS — G37.9 DEMYELINATING DISEASE: ICD-10-CM

## 2023-02-03 LAB — ERYTHROCYTE [SEDIMENTATION RATE] IN BLOOD BY WESTERGREN METHOD: 35 MM/HR (ref 0–20)

## 2023-02-03 PROCEDURE — 82300 ASSAY OF CADMIUM: CPT | Performed by: STUDENT IN AN ORGANIZED HEALTH CARE EDUCATION/TRAINING PROGRAM

## 2023-02-03 PROCEDURE — 95886 PR EMG COMPLETE, W/ NERVE CONDUCTION STUDIES, 5+ MUSCLES: ICD-10-PCS | Mod: S$GLB,,, | Performed by: STUDENT IN AN ORGANIZED HEALTH CARE EDUCATION/TRAINING PROGRAM

## 2023-02-03 PROCEDURE — 86038 ANTINUCLEAR ANTIBODIES: CPT | Performed by: STUDENT IN AN ORGANIZED HEALTH CARE EDUCATION/TRAINING PROGRAM

## 2023-02-03 PROCEDURE — 87522 HEPATITIS C REVRS TRNSCRPJ: CPT | Performed by: STUDENT IN AN ORGANIZED HEALTH CARE EDUCATION/TRAINING PROGRAM

## 2023-02-03 PROCEDURE — 95911 PR NERVE CONDUCTION STUDY; 9-10 STUDIES: ICD-10-PCS | Mod: S$GLB,,, | Performed by: STUDENT IN AN ORGANIZED HEALTH CARE EDUCATION/TRAINING PROGRAM

## 2023-02-03 PROCEDURE — 99214 PR OFFICE/OUTPT VISIT, EST, LEVL IV, 30-39 MIN: ICD-10-PCS | Mod: S$GLB,,, | Performed by: STUDENT IN AN ORGANIZED HEALTH CARE EDUCATION/TRAINING PROGRAM

## 2023-02-03 PROCEDURE — 95886 MUSC TEST DONE W/N TEST COMP: CPT | Mod: S$GLB,,, | Performed by: STUDENT IN AN ORGANIZED HEALTH CARE EDUCATION/TRAINING PROGRAM

## 2023-02-03 PROCEDURE — 99214 OFFICE O/P EST MOD 30 MIN: CPT | Mod: S$GLB,,, | Performed by: STUDENT IN AN ORGANIZED HEALTH CARE EDUCATION/TRAINING PROGRAM

## 2023-02-03 PROCEDURE — 82306 VITAMIN D 25 HYDROXY: CPT | Performed by: STUDENT IN AN ORGANIZED HEALTH CARE EDUCATION/TRAINING PROGRAM

## 2023-02-03 PROCEDURE — 86235 NUCLEAR ANTIGEN ANTIBODY: CPT | Mod: 59 | Performed by: STUDENT IN AN ORGANIZED HEALTH CARE EDUCATION/TRAINING PROGRAM

## 2023-02-03 PROCEDURE — 86235 NUCLEAR ANTIGEN ANTIBODY: CPT | Performed by: STUDENT IN AN ORGANIZED HEALTH CARE EDUCATION/TRAINING PROGRAM

## 2023-02-03 PROCEDURE — 85652 RBC SED RATE AUTOMATED: CPT | Performed by: STUDENT IN AN ORGANIZED HEALTH CARE EDUCATION/TRAINING PROGRAM

## 2023-02-03 PROCEDURE — 95911 NRV CNDJ TEST 9-10 STUDIES: CPT | Mod: S$GLB,,, | Performed by: STUDENT IN AN ORGANIZED HEALTH CARE EDUCATION/TRAINING PROGRAM

## 2023-02-03 PROCEDURE — 87389 HIV-1 AG W/HIV-1&-2 AB AG IA: CPT | Performed by: STUDENT IN AN ORGANIZED HEALTH CARE EDUCATION/TRAINING PROGRAM

## 2023-02-03 PROCEDURE — 84630 ASSAY OF ZINC: CPT | Performed by: STUDENT IN AN ORGANIZED HEALTH CARE EDUCATION/TRAINING PROGRAM

## 2023-02-03 PROCEDURE — 86705 HEP B CORE ANTIBODY IGM: CPT | Performed by: STUDENT IN AN ORGANIZED HEALTH CARE EDUCATION/TRAINING PROGRAM

## 2023-02-03 PROCEDURE — 86431 RHEUMATOID FACTOR QUANT: CPT | Performed by: STUDENT IN AN ORGANIZED HEALTH CARE EDUCATION/TRAINING PROGRAM

## 2023-02-03 PROCEDURE — 36415 COLL VENOUS BLD VENIPUNCTURE: CPT | Performed by: STUDENT IN AN ORGANIZED HEALTH CARE EDUCATION/TRAINING PROGRAM

## 2023-02-03 PROCEDURE — 82525 ASSAY OF COPPER: CPT | Performed by: STUDENT IN AN ORGANIZED HEALTH CARE EDUCATION/TRAINING PROGRAM

## 2023-02-03 PROCEDURE — 82607 VITAMIN B-12: CPT | Performed by: STUDENT IN AN ORGANIZED HEALTH CARE EDUCATION/TRAINING PROGRAM

## 2023-02-03 PROCEDURE — 86803 HEPATITIS C AB TEST: CPT | Performed by: STUDENT IN AN ORGANIZED HEALTH CARE EDUCATION/TRAINING PROGRAM

## 2023-02-03 PROCEDURE — 82390 ASSAY OF CERULOPLASMIN: CPT | Performed by: STUDENT IN AN ORGANIZED HEALTH CARE EDUCATION/TRAINING PROGRAM

## 2023-02-03 PROCEDURE — 86706 HEP B SURFACE ANTIBODY: CPT | Mod: 91 | Performed by: STUDENT IN AN ORGANIZED HEALTH CARE EDUCATION/TRAINING PROGRAM

## 2023-02-03 NOTE — PROCEDURES
Procedures      Chief Complaint and Duration     Paraesthesias in upper extremities, here for NCS/EMG    History of Present Illness     Zeny Charles is a 22 y.o.  female with a history of multiple medical diagnoses here for NCS/EMG for intermittent paraesthesias. MRI of her cevical spine showed enhancing lesion, concern for demyelination. Assessing if peripheral nerve issue associated as well.     Review of Systems: (positive bolded)  Constitutional: Negative for fatigue, activity change, fevers, or chills.   HENT:  Negative for new visual disturbances or difficulty swallowing.    Respiratory:  Negative for shortness of breath.    Cardiovascular:  Negative for palpitations.   Gastrointestinal:  Negative for constipation, nausea, or vomiting.   Endocrine: Negative for temperature instability/intolerance.   Genitourinary:  Negative for difficulty urinating.   Musculoskeletal:  Negative for neck pain, back pain, myalgias, joint swelling, or gait problem.  Skin:  Negative for rash or lesions.   Neurological:  Negative for seizures, headaches, dizziness, tremors, syncope, speech difficulty, weakness, light-headedness, or numbness.   Hematological:  Does not bruise/bleed easily.   Psychiatric/Behavioral: Negative for decreased concentration or sleep disturbance.    Review of patient's allergies indicates:  No Known Allergies  Current Outpatient Medications   Medication Sig Dispense Refill    dextroamphetamine-amphetamine 10 mg Tab Take 1 tablet (10 mg total) by mouth 2 (two) times daily. 60 tablet 0    dextroamphetamine-amphetamine 10 mg Tab Take 1 tablet (10 mg total) by mouth 2 (two) times daily. 60 tablet 0    dextroamphetamine-amphetamine 10 mg Tab Take 1 tablet (10 mg total) by mouth 2 (two) times daily. 60 tablet 0    gabapentin (NEURONTIN) 300 MG capsule Take 1 capsule (300 mg total) by mouth 3 (three) times daily. Start 1 capsule 300mg nightly x 1 week and titrate up to TID as tolerated for neuropathic pain  (Patient not taking: Reported on 1/24/2023) 90 capsule 5    methylPREDNISolone (MEDROL DOSEPACK) 4 mg tablet use as directed 21 each 0    pulse oximeter (PULSE OXIMETER) device by Apply Externally route 2 (two) times a day. Use twice daily at 8 AM and 3 PM and record the value in MyChart as directed. 1 each 0     No current facility-administered medications for this visit.       Medical History     Past Medical History:   Diagnosis Date    Factor 5 Leiden mutation, heterozygous 9/13/2016    Seizures     1 at the age of 10     Past Surgical History:   Procedure Laterality Date    TONSILLECTOMY  6 years old     Family History   Problem Relation Age of Onset    Hypertension Mother     Irregular menses Mother     Factor V Leiden deficiency Mother     Interstitial cystitis Mother     No Known Problems Father     Factor V Leiden deficiency Sister     Interstitial cystitis Maternal Aunt     Factor V Leiden deficiency Maternal Grandmother     Thyroid disease Maternal Grandmother         hypothyroidism    Mitral valve prolapse Maternal Grandfather     Congenital heart disease Maternal Grandfather         valve    Crohn's disease Paternal Grandmother     Breast cancer Neg Hx     Colon cancer Neg Hx     Ovarian cancer Neg Hx     Arrhythmia Neg Hx     Early death Neg Hx     Heart attacks under age 50 Neg Hx     Pacemaker/defibrilator Neg Hx      Social History     Socioeconomic History    Marital status: Single   Tobacco Use    Smoking status: Never    Smokeless tobacco: Never   Substance and Sexual Activity    Alcohol use: No    Drug use: No    Sexual activity: Never   Social History Narrative    In 12th grade. Lives with Mom and Dad. No smokers. No alcohol, tobacco, illicit drugs. 1 dog.        Exam     There were no vitals filed for this visit.   Physical Exam:  General: She is not in acute distress. She is not ill-appearing.   HENT: Normocephalic and atraumatic. Moist mucous membranes.  Eyes: Conjunctivae normal.    Pulmonary: Pulmonary effort is normal.   Abdominal: Abdomen is soft and flat.   Skin: Skin is warm and dry. No rashes.   Psychiatric: Mood normal.        Neurologic Exam   Mental status: oriented to person, place, and time  Attention: Normal. Concentration: normal.  Speech: speech is normal.  Cranial Nerves: PERRL, EOMI intact, V1-V3 Facial sensation intact. Symmetric facies. Hearing grossly intact. Palate and uvula midline, symmetric. No tongue deviation. Trapezius strength intact.     Motor exam: bulk and tone normal. Strength 5/5 in bilateral upper extremities: deltoids, biceps, triceps, wrist flexion/extension, finger abduction/adduction. Strength 5/5 in bilateral lower extremities: hip flexion/extension, thigh adduction/abduction, knee flexion/extension, dorsiflexion/plantarflexion, foot eversion/inversion.    Reflexes: 2+ in bilateral upper extremities: biceps and brachiaradialis, 2+ in bilateral lower extremities: patellar and achilles  Plantar reflex: normal  Easton's/Clonus: negative    Sensory exam: light touch intact    Gait exam: normal  Romberg: negative  Coordination: normal    Tremor: none    Labs and Imaging       Imaging: reviewed personally  2/1/23 MRI cervical spine - spinal cord expansion/edema in cervical region from C5-T2  12/1/22 MRI lumbar spine with L4-l5 degenerative disc disease    Assessment and Plan     Problem List Items Addressed This Visit    None  Visit Diagnoses       Demyelinating disease    -  Primary    Relevant Orders    MRI Brain Demyelinating W W/O Contrast    MRI Cervical Spine Demyelinating W W/O Contrast    ACE, CSF    YOLA    NMO AQUAPORIN-4-IGG, CSF    Ceruloplasmin    Copper, serum    Heavy Metals Screen, Blood (Quantitative)    Hepatitis B core antibody, IgM    Hepatitis B surface antibody    Hepatitis C antibody    Hepatitis C RNA, quantitative, PCR    HIV 1/2 Ag/Ab (4th Gen)    HTLV I/II Antibody    Rheumatoid factor    Sedimentation rate    Sjogrens syndrome-A  extractable nuclear antibody    Sjogrens syndrome-B extractable nuclear antibody    Vitamin B12    Vitamin D    Zinc    Ambulatory referral/consult to Interventional Radiology    Cell Count w/ Diff, CSF    FL LUMBAR PUNCTURE MS PROTOCOL DIAGNOSTIC WITH IMAGIING    Ms Profile    Ms Profile Blood Collection    Protein, CSF    Carpal tunnel syndrome, bilateral        Ulnar neuropathy of both upper extremities              Patient w paraesthesias , concern prior for carpal tunnel or cervical radiculopathy. NCS/EMG today normal. Reviewed cervical spine MRI w patient, shows expansion/edema in cervical region from C5-T2 concerning for possible lesion vs demyelination.     Follow-up: after MRI and lumbar tap, orders placed

## 2023-02-04 LAB
25(OH)D3+25(OH)D2 SERPL-MCNC: 19 NG/ML (ref 30–96)
HBV CORE IGM SERPL QL IA: NORMAL
HBV SURFACE AB SER-ACNC: 16.18 MIU/ML
HBV SURFACE AB SER-ACNC: REACTIVE M[IU]/ML
HCV AB SERPL QL IA: NORMAL
HIV 1+2 AB+HIV1 P24 AG SERPL QL IA: NORMAL
VIT B12 SERPL-MCNC: 373 PG/ML (ref 210–950)

## 2023-02-05 LAB
CERULOPLASMIN SERPL-MCNC: 29 MG/DL (ref 15–45)
RHEUMATOID FACT SERPL-ACNC: <13 IU/ML (ref 0–15)

## 2023-02-06 LAB
ANA SER QL IF: NORMAL
ANTI-SSA ANTIBODY: 0.06 RATIO (ref 0–0.99)
ANTI-SSA INTERPRETATION: NEGATIVE
ANTI-SSB ANTIBODY: 0.06 RATIO (ref 0–0.99)
ANTI-SSB INTERPRETATION: NEGATIVE
ARSENIC BLD-MCNC: 1 NG/ML
CADMIUM BLD-MCNC: 0.2 NG/ML
CITY: NORMAL
COUNTY: NORMAL
GUARDIAN FIRST NAME: NORMAL
GUARDIAN LAST NAME: NORMAL
HCV RNA SERPL QL NAA+PROBE: NOT DETECTED
HCV RNA SPEC NAA+PROBE-ACNC: NOT DETECTED IU/ML
HOME PHONE: NORMAL
LEAD BLD-MCNC: <1 MCG/DL
MERCURY BLD-MCNC: 1 NG/ML
RACE: NORMAL
STATE: NORMAL
STREET ADDRESS: NORMAL
VENOUS/CAPILLARY: NORMAL
ZIP: NORMAL

## 2023-02-07 ENCOUNTER — HOSPITAL ENCOUNTER (OUTPATIENT)
Dept: RADIOLOGY | Facility: HOSPITAL | Age: 23
Discharge: HOME | DRG: 059 | End: 2023-02-07
Attending: STUDENT IN AN ORGANIZED HEALTH CARE EDUCATION/TRAINING PROGRAM
Payer: COMMERCIAL

## 2023-02-07 DIAGNOSIS — G37.9 DEMYELINATING DISEASE: ICD-10-CM

## 2023-02-07 PROCEDURE — 72156 MRI NECK SPINE W/O & W/DYE: CPT | Mod: TC

## 2023-02-07 PROCEDURE — 70553 MRI BRAIN STEM W/O & W/DYE: CPT | Mod: TC

## 2023-02-07 PROCEDURE — A9585 GADOBUTROL INJECTION: HCPCS | Performed by: STUDENT IN AN ORGANIZED HEALTH CARE EDUCATION/TRAINING PROGRAM

## 2023-02-07 PROCEDURE — 25500020 PHARM REV CODE 255: Performed by: STUDENT IN AN ORGANIZED HEALTH CARE EDUCATION/TRAINING PROGRAM

## 2023-02-07 PROCEDURE — 70553 MRI BRAIN DEMYELINATING W/ WO CONTRAST: ICD-10-PCS | Mod: 26,,, | Performed by: RADIOLOGY

## 2023-02-07 PROCEDURE — 72156 MRI NECK SPINE W/O & W/DYE: CPT | Mod: 26,,, | Performed by: RADIOLOGY

## 2023-02-07 PROCEDURE — 70553 MRI BRAIN STEM W/O & W/DYE: CPT | Mod: 26,,, | Performed by: RADIOLOGY

## 2023-02-07 PROCEDURE — 72156 MRI CERVICAL SPINE DEMYELINATING W W/O CONTRAST: ICD-10-PCS | Mod: 26,,, | Performed by: RADIOLOGY

## 2023-02-07 RX ORDER — GADOBUTROL 604.72 MG/ML
9 INJECTION INTRAVENOUS
Status: COMPLETED | OUTPATIENT
Start: 2023-02-07 | End: 2023-02-07

## 2023-02-07 RX ADMIN — GADOBUTROL 9 ML: 604.72 INJECTION INTRAVENOUS at 05:02

## 2023-02-08 ENCOUNTER — PATIENT MESSAGE (OUTPATIENT)
Dept: NEUROLOGY | Facility: CLINIC | Age: 23
End: 2023-02-08

## 2023-02-08 ENCOUNTER — TELEPHONE (OUTPATIENT)
Dept: NEUROLOGY | Facility: CLINIC | Age: 23
End: 2023-02-08
Payer: COMMERCIAL

## 2023-02-08 ENCOUNTER — OFFICE VISIT (OUTPATIENT)
Dept: NEUROLOGY | Facility: CLINIC | Age: 23
End: 2023-02-08
Payer: COMMERCIAL

## 2023-02-08 ENCOUNTER — HOSPITAL ENCOUNTER (INPATIENT)
Facility: HOSPITAL | Age: 23
LOS: 2 days | Discharge: HOME | DRG: 059 | End: 2023-02-10
Attending: EMERGENCY MEDICINE | Admitting: STUDENT IN AN ORGANIZED HEALTH CARE EDUCATION/TRAINING PROGRAM
Payer: COMMERCIAL

## 2023-02-08 ENCOUNTER — TELEPHONE (OUTPATIENT)
Dept: NEUROLOGY | Facility: CLINIC | Age: 23
End: 2023-02-08

## 2023-02-08 DIAGNOSIS — R20.2 PARESTHESIAS: Primary | ICD-10-CM

## 2023-02-08 DIAGNOSIS — R20.2 PARESTHESIA: ICD-10-CM

## 2023-02-08 DIAGNOSIS — G37.9 DEMYELINATING DISEASE: ICD-10-CM

## 2023-02-08 DIAGNOSIS — R07.9 CHEST PAIN: ICD-10-CM

## 2023-02-08 DIAGNOSIS — G37.9 DEMYELINATING CHANGES IN BRAIN: ICD-10-CM

## 2023-02-08 DIAGNOSIS — G37.9 DEMYELINATING DISEASE: Primary | ICD-10-CM

## 2023-02-08 LAB
ALBUMIN SERPL BCP-MCNC: 4.2 G/DL (ref 3.5–5.2)
ALP SERPL-CCNC: 66 U/L (ref 55–135)
ALT SERPL W/O P-5'-P-CCNC: 22 U/L (ref 10–44)
ANION GAP SERPL CALC-SCNC: 12 MMOL/L (ref 8–16)
AST SERPL-CCNC: 16 U/L (ref 10–40)
BACTERIA #/AREA URNS AUTO: ABNORMAL /HPF
BASOPHILS # BLD AUTO: 0.06 K/UL (ref 0–0.2)
BASOPHILS NFR BLD: 0.6 % (ref 0–1.9)
BILIRUB SERPL-MCNC: 0.3 MG/DL (ref 0.1–1)
BILIRUB UR QL STRIP: NEGATIVE
BUN SERPL-MCNC: 8 MG/DL (ref 6–20)
CALCIUM SERPL-MCNC: 9.7 MG/DL (ref 8.7–10.5)
CHLORIDE SERPL-SCNC: 106 MMOL/L (ref 95–110)
CLARITY UR REFRACT.AUTO: ABNORMAL
CO2 SERPL-SCNC: 23 MMOL/L (ref 23–29)
COLOR UR AUTO: YELLOW
COPPER SERPL-MCNC: 1140 UG/L (ref 810–1990)
CREAT SERPL-MCNC: 0.8 MG/DL (ref 0.5–1.4)
DIFFERENTIAL METHOD: NORMAL
EOSINOPHIL # BLD AUTO: 0.1 K/UL (ref 0–0.5)
EOSINOPHIL NFR BLD: 0.7 % (ref 0–8)
ERYTHROCYTE [DISTWIDTH] IN BLOOD BY AUTOMATED COUNT: 11.9 % (ref 11.5–14.5)
EST. GFR  (NO RACE VARIABLE): >60 ML/MIN/1.73 M^2
GLUCOSE SERPL-MCNC: 79 MG/DL (ref 70–110)
GLUCOSE UR QL STRIP: NEGATIVE
HCT VFR BLD AUTO: 46.6 % (ref 37–48.5)
HGB BLD-MCNC: 15.4 G/DL (ref 12–16)
HGB UR QL STRIP: NEGATIVE
IMM GRANULOCYTES # BLD AUTO: 0.03 K/UL (ref 0–0.04)
IMM GRANULOCYTES NFR BLD AUTO: 0.3 % (ref 0–0.5)
KETONES UR QL STRIP: NEGATIVE
LEUKOCYTE ESTERASE UR QL STRIP: ABNORMAL
LYMPHOCYTES # BLD AUTO: 3.1 K/UL (ref 1–4.8)
LYMPHOCYTES NFR BLD: 28.7 % (ref 18–48)
MCH RBC QN AUTO: 31 PG (ref 27–31)
MCHC RBC AUTO-ENTMCNC: 33 G/DL (ref 32–36)
MCV RBC AUTO: 94 FL (ref 82–98)
MICROSCOPIC COMMENT: ABNORMAL
MONOCYTES # BLD AUTO: 0.9 K/UL (ref 0.3–1)
MONOCYTES NFR BLD: 8.2 % (ref 4–15)
NEUTROPHILS # BLD AUTO: 6.6 K/UL (ref 1.8–7.7)
NEUTROPHILS NFR BLD: 61.5 % (ref 38–73)
NITRITE UR QL STRIP: NEGATIVE
NRBC BLD-RTO: 0 /100 WBC
PH UR STRIP: 6 [PH] (ref 5–8)
PLATELET # BLD AUTO: 311 K/UL (ref 150–450)
PMV BLD AUTO: 12 FL (ref 9.2–12.9)
POTASSIUM SERPL-SCNC: 3.8 MMOL/L (ref 3.5–5.1)
PROT SERPL-MCNC: 8.5 G/DL (ref 6–8.4)
PROT UR QL STRIP: NEGATIVE
RBC # BLD AUTO: 4.97 M/UL (ref 4–5.4)
RBC #/AREA URNS AUTO: 3 /HPF (ref 0–4)
SODIUM SERPL-SCNC: 141 MMOL/L (ref 136–145)
SP GR UR STRIP: 1.02 (ref 1–1.03)
SQUAMOUS #/AREA URNS AUTO: 29 /HPF
URN SPEC COLLECT METH UR: ABNORMAL
WBC # BLD AUTO: 10.68 K/UL (ref 3.9–12.7)
WBC #/AREA URNS AUTO: 99 /HPF (ref 0–5)
ZINC SERPL-MCNC: 71 UG/DL (ref 60–130)

## 2023-02-08 PROCEDURE — 99285 EMERGENCY DEPT VISIT HI MDM: CPT | Mod: 25

## 2023-02-08 PROCEDURE — 99285 PR EMERGENCY DEPT VISIT,LEVEL V: ICD-10-PCS | Mod: ,,, | Performed by: EMERGENCY MEDICINE

## 2023-02-08 PROCEDURE — 87086 URINE CULTURE/COLONY COUNT: CPT | Performed by: NURSE PRACTITIONER

## 2023-02-08 PROCEDURE — 99223 PR INITIAL HOSPITAL CARE,LEVL III: ICD-10-PCS | Mod: ,,, | Performed by: NURSE PRACTITIONER

## 2023-02-08 PROCEDURE — 85025 COMPLETE CBC W/AUTO DIFF WBC: CPT | Performed by: EMERGENCY MEDICINE

## 2023-02-08 PROCEDURE — 99285 EMERGENCY DEPT VISIT HI MDM: CPT | Mod: ,,, | Performed by: EMERGENCY MEDICINE

## 2023-02-08 PROCEDURE — 63600175 PHARM REV CODE 636 W HCPCS: Performed by: EMERGENCY MEDICINE

## 2023-02-08 PROCEDURE — 25000003 PHARM REV CODE 250: Performed by: NURSE PRACTITIONER

## 2023-02-08 PROCEDURE — 81001 URINALYSIS AUTO W/SCOPE: CPT | Performed by: NURSE PRACTITIONER

## 2023-02-08 PROCEDURE — 12000002 HC ACUTE/MED SURGE SEMI-PRIVATE ROOM

## 2023-02-08 PROCEDURE — 80053 COMPREHEN METABOLIC PANEL: CPT | Performed by: EMERGENCY MEDICINE

## 2023-02-08 PROCEDURE — 99215 PR OFFICE/OUTPT VISIT, EST, LEVL V, 40-54 MIN: ICD-10-PCS | Mod: 95,,, | Performed by: STUDENT IN AN ORGANIZED HEALTH CARE EDUCATION/TRAINING PROGRAM

## 2023-02-08 PROCEDURE — 99223 1ST HOSP IP/OBS HIGH 75: CPT | Mod: ,,, | Performed by: NURSE PRACTITIONER

## 2023-02-08 PROCEDURE — 99215 OFFICE O/P EST HI 40 MIN: CPT | Mod: 95,,, | Performed by: STUDENT IN AN ORGANIZED HEALTH CARE EDUCATION/TRAINING PROGRAM

## 2023-02-08 PROCEDURE — 96374 THER/PROPH/DIAG INJ IV PUSH: CPT

## 2023-02-08 RX ORDER — LIDOCAINE 50 MG/G
2 PATCH TOPICAL
Status: DISCONTINUED | OUTPATIENT
Start: 2023-02-08 | End: 2023-02-08

## 2023-02-08 RX ORDER — GLUCAGON 1 MG
1 KIT INJECTION
Status: DISCONTINUED | OUTPATIENT
Start: 2023-02-08 | End: 2023-02-10 | Stop reason: HOSPADM

## 2023-02-08 RX ORDER — HYDROXYZINE HYDROCHLORIDE 25 MG/1
25 TABLET, FILM COATED ORAL 3 TIMES DAILY PRN
Status: DISCONTINUED | OUTPATIENT
Start: 2023-02-08 | End: 2023-02-10 | Stop reason: HOSPADM

## 2023-02-08 RX ORDER — ACETAMINOPHEN 325 MG/1
650 TABLET ORAL EVERY 4 HOURS PRN
Status: DISCONTINUED | OUTPATIENT
Start: 2023-02-08 | End: 2023-02-10 | Stop reason: HOSPADM

## 2023-02-08 RX ORDER — PANTOPRAZOLE SODIUM 40 MG/1
40 TABLET, DELAYED RELEASE ORAL DAILY
Qty: 5 TABLET | Refills: 0 | Status: SHIPPED | OUTPATIENT
Start: 2023-02-08 | End: 2023-02-09

## 2023-02-08 RX ORDER — IBUPROFEN 200 MG
16 TABLET ORAL
Status: DISCONTINUED | OUTPATIENT
Start: 2023-02-08 | End: 2023-02-10 | Stop reason: HOSPADM

## 2023-02-08 RX ORDER — SODIUM CHLORIDE 0.9 % (FLUSH) 0.9 %
10 SYRINGE (ML) INJECTION EVERY 12 HOURS PRN
Status: DISCONTINUED | OUTPATIENT
Start: 2023-02-08 | End: 2023-02-10 | Stop reason: HOSPADM

## 2023-02-08 RX ORDER — METHYLPREDNISOLONE SOD SUCC 125 MG
1000 VIAL (EA) INJECTION
Status: COMPLETED | OUTPATIENT
Start: 2023-02-08 | End: 2023-02-08

## 2023-02-08 RX ORDER — ONDANSETRON 2 MG/ML
4 INJECTION INTRAMUSCULAR; INTRAVENOUS EVERY 8 HOURS PRN
Status: DISCONTINUED | OUTPATIENT
Start: 2023-02-08 | End: 2023-02-10 | Stop reason: HOSPADM

## 2023-02-08 RX ORDER — IBUPROFEN 200 MG
24 TABLET ORAL
Status: DISCONTINUED | OUTPATIENT
Start: 2023-02-08 | End: 2023-02-10 | Stop reason: HOSPADM

## 2023-02-08 RX ORDER — METHOCARBAMOL 500 MG/1
500 TABLET, FILM COATED ORAL 4 TIMES DAILY PRN
Status: DISCONTINUED | OUTPATIENT
Start: 2023-02-08 | End: 2023-02-10 | Stop reason: HOSPADM

## 2023-02-08 RX ORDER — PANTOPRAZOLE SODIUM 40 MG/1
40 TABLET, DELAYED RELEASE ORAL DAILY
Status: DISCONTINUED | OUTPATIENT
Start: 2023-02-09 | End: 2023-02-10 | Stop reason: HOSPADM

## 2023-02-08 RX ORDER — PREDNISONE 50 MG/1
1250 TABLET ORAL DAILY
Qty: 125 TABLET | Refills: 0 | Status: SHIPPED | OUTPATIENT
Start: 2023-02-08 | End: 2023-02-09

## 2023-02-08 RX ADMIN — METHOCARBAMOL 500 MG: 500 TABLET ORAL at 07:02

## 2023-02-08 RX ADMIN — HYDROXYZINE HYDROCHLORIDE 25 MG: 25 TABLET, FILM COATED ORAL at 07:02

## 2023-02-08 RX ADMIN — METHYLPREDNISOLONE SODIUM SUCCINATE 1000 MG: 125 INJECTION, POWDER, FOR SOLUTION INTRAMUSCULAR; INTRAVENOUS at 06:02

## 2023-02-08 NOTE — TELEPHONE ENCOUNTER
Spoke to patient and her mom. They prefer getting admitted instead, will plan on going to [facility] to be evaluated by neurology team with potential plans for high dose steroids x 5 days for demyelination. Discussed w mother that they may proceed w LP during inpatient stay and can get set up with MS specialist.    Marcela Rendon MD  [facility]

## 2023-02-08 NOTE — ED PROVIDER NOTES
"Encounter Date: 2/8/2023       History     Chief Complaint   Patient presents with    Numbness     Pt states she was sent by neurology for steroids.  States "she thinks I have MS".  Pt has hand and feet numbness     23 yo W with pmhx seizures, Factor V Leiden, obesity, recent diagnosis of active demyelinating disease presents from Neurology for steroids for possible MS flare.  Patient has been suffering from recurrent paresthesias since August after returning from [place].  She was evaluated by neurology Dr. Marcela Rendon who obtained MRI brain and cervical spine that revealed active demyelinating disease.  She planned to treat the patient with 1250 mg of prednisone for 5 days with GI prophylaxis as an outpatient.  Also pending lumbar puncture.  The patient and the mom are uncomfortable with this plan taking them any feels as an outpatient and came to the ER.  Patient endorses anxiety.  No fevers, chills, recent illness.  She reports paresthesias present in fingers of bilateral hands and her lower back.  Her mother is uncomfortable with her getting a lumbar puncture prior to evaluation by Neurology.    Review of patient's allergies indicates:  No Known Allergies  Past Medical History:   Diagnosis Date    Factor 5 Leiden mutation, heterozygous 9/13/2016    Seizures     1 at the age of 10     Past Surgical History:   Procedure Laterality Date    TONSILLECTOMY  6 years old     Family History   Problem Relation Age of Onset    Hypertension Mother     Irregular menses Mother     Factor V Leiden deficiency Mother     Interstitial cystitis Mother     No Known Problems Father     Factor V Leiden deficiency Sister     Interstitial cystitis Maternal Aunt     Factor V Leiden deficiency Maternal Grandmother     Thyroid disease Maternal Grandmother         hypothyroidism    Mitral valve prolapse Maternal Grandfather     Congenital heart disease Maternal Grandfather         valve    Crohn's disease Paternal Grandmother     Breast " cancer Neg Hx     Colon cancer Neg Hx     Ovarian cancer Neg Hx     Arrhythmia Neg Hx     Early death Neg Hx     Heart attacks under age 50 Neg Hx     Pacemaker/defibrilator Neg Hx      Social History     Tobacco Use    Smoking status: Never    Smokeless tobacco: Never   Substance Use Topics    Alcohol use: No    Drug use: No     Review of Systems    Physical Exam     Initial Vitals [02/08/23 1552]   BP Pulse Resp Temp SpO2   (!) 136/97 (!) 111 16 99.1 °F (37.3 °C) 99 %      MAP       --         Physical Exam    Nursing note and vitals reviewed.  Constitutional: She appears well-developed and well-nourished. She is not diaphoretic. No distress.   HENT:   Head: Normocephalic and atraumatic.   Eyes: Right eye exhibits no discharge. Left eye exhibits no discharge. No scleral icterus.   Neck: Neck supple. No JVD present.   Normal range of motion.  Cardiovascular:  Normal rate, regular rhythm and normal heart sounds.     Exam reveals no gallop and no friction rub.       No murmur heard.  Not tachycardic despite as noted by triage vitals   Pulmonary/Chest: Breath sounds normal. No respiratory distress. She has no wheezes. She has no rhonchi. She has no rales. She exhibits no tenderness.   Abdominal: Abdomen is soft. Bowel sounds are normal. She exhibits no distension and no mass. There is no abdominal tenderness. There is no rebound and no guarding.   Musculoskeletal:         General: No tenderness or edema. Normal range of motion.      Cervical back: Normal range of motion and neck supple.     Neurological: She is alert and oriented to person, place, and time. She has normal strength.   Skin: Skin is warm and dry. Capillary refill takes less than 2 seconds.   Psychiatric: Thought content normal.       ED Course   Procedures  Labs Reviewed   COMPREHENSIVE METABOLIC PANEL - Abnormal; Notable for the following components:       Result Value    Total Protein 8.5 (*)     All other components within normal limits   CBC W/ AUTO  DIFFERENTIAL          Imaging Results    None          Medications   methylPREDNISolone sodium succinate injection 1,000 mg (1,000 mg Intravenous Given 2/8/23 1814)     Medical Decision Making:   History:   I obtained history from: someone other than patient.  Old Medical Records: I decided to obtain old medical records.  Initial Assessment:   21 yo W with pmhx seizures, Factor V Leiden, obesity, recent diagnosis of active demyelinating disease presents from Neurology for steroids for possible MS flare.  Differential Diagnosis:   MS flare, demyelinating disease, electrolyte abnormalities, infectious issues  Clinical Tests:   Lab Tests: Ordered  Radiological Study: Reviewed  ED Management:  Will administer 1 g of Solu-Medrol IV.  Neurology consulted.  Will admit to medicine.                 Reassessment:  CBC without leukocytosis or anemia.  CMP with elevated total protein of 8.5, otherwise normal.  Inpatient per case management.       Clinical Impression:   Final diagnoses:  [R20.2] Paresthesias (Primary)  [G37.9] Demyelinating changes in brain        ED Disposition Condition    Admit Stable                Artis Bah MD  02/08/23 5133

## 2023-02-08 NOTE — PROGRESS NOTES
Televisit    Chief Complaint and Duration     Parasthesias, discuss MRI results    History of Present Illness     Zeny Charles is a 22 y.o. female with a history of multiple medical diagnoses including having Covid this past august. Presents for parasthesias.     States she was in [place] for prolonged stay, afterwards got numbness and tingling in both feet and also toes , evaluated by podiatrist. Referred here for parasthesias.    States numbness and tingling in feet resolved. Now with numbness and tingling that is intermittently located on her lateral thigh to knee region on the left. No specific pattern but notices it when she sits down. Does have back pain, no weakness. Does not wake her up from sleep. No bowel or bladder issues.     Works as a .     Interim period: 2/8/23 -  Workup w MRI showing evidence of active demyelination in C spine and brain.     Review of Systems: (positive bolded)  Constitutional: Negative for fatigue, activity change, fevers, or chills.   HENT:  Negative for new visual disturbances or difficulty swallowing.    Respiratory:  Negative for shortness of breath.    Cardiovascular:  Negative for palpitations.   Gastrointestinal:  Negative for constipation, nausea, or vomiting.   Endocrine: Negative for temperature instability/intolerance.   Genitourinary:  Negative for difficulty urinating.   Musculoskeletal:  Negative for neck pain, back pain, myalgias, joint swelling, or gait problem.  Skin:  Negative for rash or lesions.   Neurological:  Negative for seizures, headaches, dizziness, tremors, syncope, speech difficulty, weakness, light-headedness, or numbness.   Hematological:  Does not bruise/bleed easily.   Psychiatric/Behavioral: Negative for decreased concentration or sleep disturbance.    Review of patient's allergies indicates:  No Known Allergies  Current Outpatient Medications   Medication Sig Dispense Refill    dextroamphetamine-amphetamine 10 mg Tab Take 1  tablet (10 mg total) by mouth 2 (two) times daily. 60 tablet 0    dextroamphetamine-amphetamine 10 mg Tab Take 1 tablet (10 mg total) by mouth 2 (two) times daily. 60 tablet 0    dextroamphetamine-amphetamine 10 mg Tab Take 1 tablet (10 mg total) by mouth 2 (two) times daily. 60 tablet 0    methylPREDNISolone (MEDROL DOSEPACK) 4 mg tablet use as directed 21 each 0    pantoprazole (PROTONIX) 40 MG tablet Take 1 tablet (40 mg total) by mouth once daily for 5 days 5 tablet 0    predniSONE (DELTASONE) 50 MG Tab Take 25 tablets (1,250 mg total) by mouth once daily. for 5 days 125 tablet 0    pulse oximeter (PULSE OXIMETER) device by Apply Externally route 2 (two) times a day. Use twice daily at 8 AM and 3 PM and record the value in [employer] as directed. 1 each 0     No current facility-administered medications for this visit.       Medical History     Past Medical History:   Diagnosis Date    Factor 5 Leiden mutation, heterozygous 9/13/2016    Seizures     1 at the age of 10     Past Surgical History:   Procedure Laterality Date    TONSILLECTOMY  6 years old     Family History   Problem Relation Age of Onset    Hypertension Mother     Irregular menses Mother     Factor V Leiden deficiency Mother     Interstitial cystitis Mother     No Known Problems Father     Factor V Leiden deficiency Sister     Interstitial cystitis Maternal Aunt     Factor V Leiden deficiency Maternal Grandmother     Thyroid disease Maternal Grandmother         hypothyroidism    Mitral valve prolapse Maternal Grandfather     Congenital heart disease Maternal Grandfather         valve    Crohn's disease Paternal Grandmother     Breast cancer Neg Hx     Colon cancer Neg Hx     Ovarian cancer Neg Hx     Arrhythmia Neg Hx     Early death Neg Hx     Heart attacks under age 50 Neg Hx     Pacemaker/defibrilator Neg Hx      Social History     Socioeconomic History    Marital status: Single   Tobacco Use    Smoking status: Never    Smokeless tobacco: Never    Substance and Sexual Activity    Alcohol use: No    Drug use: No    Sexual activity: Never   Social History Narrative    In 12th grade. Lives with Mom and Dad. No smokers. No alcohol, tobacco, illicit drugs. 1 dog.        Exam     There were no vitals filed for this visit.     Physical Exam:  General: She is not in acute distress. She is not ill-appearing.   HENT: Normocephalic and atraumatic. Moist mucous membranes.  Neuro: moving extremities w focal deficit, symmetric facies    Labs and Imaging     Labs: reviewed personally  , TSH nml, A1C 4.8    Imaging: reviewed  MRI brain and C spine personally reviewed which shows enhancing lesions    Other procedures: reviewed  NCS/EMG w no significant findings    Assessment and Plan     Problem List Items Addressed This Visit          Neuro    Demyelinating disease - Primary    Relevant Medications    predniSONE (DELTASONE) 50 MG Tab    pantoprazole (PROTONIX) 40 MG tablet       Other    Paresthesia     Patient w initial presentation of paraesthesias showing evidence of demyelinating disease in brain and spinal cord, will plan to treat with 1250mg prednisone x 5 days with GI prophylaxis. Followup this coming Monday, to discuss plan moving forward with immunomodulator. Still pending LP and CSF studies.    Follow-up: this coming Monday    Time spent on this encounter: 50 minutes. This includes face to face time and non-face to face time preparing to see the patient (eg, review of tests), obtaining and/or reviewing separately obtained history, documenting clinical information in the electronic or other health record, independently interpreting results and communicating results to the patient/family/caregiver, or care coordinator.

## 2023-02-08 NOTE — TELEPHONE ENCOUNTER
----- Message from Berthatanisha Olivares sent at 2/8/2023  2:42 PM CST -----  Type: Patient Call Back        Who called: Mother         What is the request in detail: She wants to know instead of taking the medication can she get a out pt infusion ..        Can the clinic reply by [facility]? No         Would the patient rather a call back or a response via [facility]? Yes         Best call back number: [phone] (home)                     Thank You      Dr Rendon spoke to patient

## 2023-02-08 NOTE — TELEPHONE ENCOUNTER
----- Message from Deonte Be sent at 2/8/2023 11:26 AM CST -----  Regarding: Returning Call  .Type:  Patient Returning Call    Who Called: Self    Who Left Message for Patient: Pt was calling back to schedule a virtual appt for today @ 11:40 am    Does the patient know what this is regarding?: Virtual appt    Would the patient rather a call back or a response via [facility]? Call back    Best Call Back Number: [phone]     Additional Information: Nothing showing in Epic until 02/13      Appointment booked with patient

## 2023-02-08 NOTE — ED NOTES
"Zeny Charles, a 22 y.o. female presents to the ED w/ complaint of numbness    Triage note:  Chief Complaint   Patient presents with    Numbness     Pt states she was sent by neurology for steroids.  States "she thinks I have MS".  Pt has hand and feet numbness     Review of patient's allergies indicates:  No Known Allergies  Past Medical History:   Diagnosis Date    Factor 5 Leiden mutation, heterozygous 9/13/2016    Seizures     1 at the age of 10     LOC awake and alert, cooperative, calm affect, recognizes caregiver, responds appropriately for age  APPEARANCE resting comfortably in no acute distress. Pt has clean skin, nails, and clothes.   HEENT Head appears normal in size and shape,  Eyes appear normal w/o drainage, Ears appear normal w/o drainage, nose appears normal w/o drainage/mucus, Throat and neck appear normal w/o drainage/redness  NEURO eyes open spontaneously, responses appropriate, pupils equal in size,  RESPIRATORY airway open and patent, respirations of regular rate and rhythm, nonlabored, no respiratory distress observed  MUSCULOSKELETAL moves all extremities well, no obvious deformities, reports numbness to hands and feet  SKIN normal color for ethnicity, warm, dry, with normal turgor, moist mucous membranes, no bruising or breakdown observed  ABDOMEN soft, non tender, non distended, no guarding, regular bowel movements  GENITOURINARY voiding well, denies any issues voiding   "

## 2023-02-09 ENCOUNTER — PATIENT MESSAGE (OUTPATIENT)
Dept: NEUROLOGY | Facility: CLINIC | Age: 23
End: 2023-02-09
Payer: COMMERCIAL

## 2023-02-09 DIAGNOSIS — G37.9 DEMYELINATING DISEASE: Primary | ICD-10-CM

## 2023-02-09 PROBLEM — N39.0 UTI (URINARY TRACT INFECTION): Status: ACTIVE | Noted: 2023-02-09

## 2023-02-09 PROBLEM — E55.9 VITAMIN D DEFICIENCY: Status: ACTIVE | Noted: 2023-02-09

## 2023-02-09 LAB
ALBUMIN SERPL BCP-MCNC: 3.8 G/DL (ref 3.5–5.2)
ALP SERPL-CCNC: 59 U/L (ref 55–135)
ALT SERPL W/O P-5'-P-CCNC: 23 U/L (ref 10–44)
ANION GAP SERPL CALC-SCNC: 14 MMOL/L (ref 8–16)
AST SERPL-CCNC: 15 U/L (ref 10–40)
BASOPHILS # BLD AUTO: ABNORMAL K/UL (ref 0–0.2)
BASOPHILS NFR BLD: 0 % (ref 0–1.9)
BILIRUB SERPL-MCNC: 0.3 MG/DL (ref 0.1–1)
BUN SERPL-MCNC: 8 MG/DL (ref 6–20)
CALCIUM SERPL-MCNC: 9.4 MG/DL (ref 8.7–10.5)
CHLORIDE SERPL-SCNC: 106 MMOL/L (ref 95–110)
CO2 SERPL-SCNC: 19 MMOL/L (ref 23–29)
CREAT SERPL-MCNC: 0.7 MG/DL (ref 0.5–1.4)
DACRYOCYTES BLD QL SMEAR: ABNORMAL
DIFFERENTIAL METHOD: ABNORMAL
EOSINOPHIL # BLD AUTO: ABNORMAL K/UL (ref 0–0.5)
EOSINOPHIL NFR BLD: 0 % (ref 0–8)
ERYTHROCYTE [DISTWIDTH] IN BLOOD BY AUTOMATED COUNT: 11.6 % (ref 11.5–14.5)
EST. GFR  (NO RACE VARIABLE): >60 ML/MIN/1.73 M^2
GLUCOSE SERPL-MCNC: 177 MG/DL (ref 70–110)
HCT VFR BLD AUTO: 42.4 % (ref 37–48.5)
HGB BLD-MCNC: 14 G/DL (ref 12–16)
IMM GRANULOCYTES # BLD AUTO: ABNORMAL K/UL (ref 0–0.04)
IMM GRANULOCYTES NFR BLD AUTO: ABNORMAL % (ref 0–0.5)
LYMPHOCYTES # BLD AUTO: ABNORMAL K/UL (ref 1–4.8)
LYMPHOCYTES NFR BLD: 6 % (ref 18–48)
MAGNESIUM SERPL-MCNC: 1.8 MG/DL (ref 1.6–2.6)
MCH RBC QN AUTO: 30.6 PG (ref 27–31)
MCHC RBC AUTO-ENTMCNC: 33 G/DL (ref 32–36)
MCV RBC AUTO: 93 FL (ref 82–98)
MONOCYTES # BLD AUTO: ABNORMAL K/UL (ref 0.3–1)
MONOCYTES NFR BLD: 2 % (ref 4–15)
NEUTROPHILS NFR BLD: 91 % (ref 38–73)
NEUTS BAND NFR BLD MANUAL: 1 %
NRBC BLD-RTO: 0 /100 WBC
PLATELET # BLD AUTO: 272 K/UL (ref 150–450)
PLATELET BLD QL SMEAR: ABNORMAL
PMV BLD AUTO: 12.6 FL (ref 9.2–12.9)
POTASSIUM SERPL-SCNC: 3.8 MMOL/L (ref 3.5–5.1)
PROT SERPL-MCNC: 7.6 G/DL (ref 6–8.4)
RBC # BLD AUTO: 4.57 M/UL (ref 4–5.4)
SODIUM SERPL-SCNC: 139 MMOL/L (ref 136–145)
SPHEROCYTES BLD QL SMEAR: ABNORMAL
WBC # BLD AUTO: 8.24 K/UL (ref 3.9–12.7)

## 2023-02-09 PROCEDURE — 99233 PR SUBSEQUENT HOSPITAL CARE,LEVL III: ICD-10-PCS | Mod: ,,, | Performed by: HOSPITALIST

## 2023-02-09 PROCEDURE — 84207 ASSAY OF VITAMIN B-6: CPT

## 2023-02-09 PROCEDURE — 99233 SBSQ HOSP IP/OBS HIGH 50: CPT | Mod: ,,, | Performed by: HOSPITALIST

## 2023-02-09 PROCEDURE — 25000003 PHARM REV CODE 250: Performed by: NURSE PRACTITIONER

## 2023-02-09 PROCEDURE — 63600175 PHARM REV CODE 636 W HCPCS: Performed by: HOSPITALIST

## 2023-02-09 PROCEDURE — 84425 ASSAY OF VITAMIN B-1: CPT

## 2023-02-09 PROCEDURE — 83735 ASSAY OF MAGNESIUM: CPT | Performed by: NURSE PRACTITIONER

## 2023-02-09 PROCEDURE — 84165 PROTEIN E-PHORESIS SERUM: CPT

## 2023-02-09 PROCEDURE — 80053 COMPREHEN METABOLIC PANEL: CPT | Performed by: NURSE PRACTITIONER

## 2023-02-09 PROCEDURE — 85027 COMPLETE CBC AUTOMATED: CPT | Performed by: NURSE PRACTITIONER

## 2023-02-09 PROCEDURE — 86592 SYPHILIS TEST NON-TREP QUAL: CPT

## 2023-02-09 PROCEDURE — 25000003 PHARM REV CODE 250: Performed by: HOSPITALIST

## 2023-02-09 PROCEDURE — 85007 BL SMEAR W/DIFF WBC COUNT: CPT | Performed by: NURSE PRACTITIONER

## 2023-02-09 PROCEDURE — 84165 PROTEIN E-PHORESIS SERUM: CPT | Mod: 26,,, | Performed by: PATHOLOGY

## 2023-02-09 PROCEDURE — 11000001 HC ACUTE MED/SURG PRIVATE ROOM

## 2023-02-09 PROCEDURE — 84165 PATHOLOGIST INTERPRETATION SPE: ICD-10-PCS | Mod: 26,,, | Performed by: PATHOLOGY

## 2023-02-09 RX ORDER — TIRZEPATIDE 2.5 MG/.5ML
2.5 INJECTION, SOLUTION SUBCUTANEOUS
COMMUNITY
Start: 2022-11-14 | End: 2023-03-15

## 2023-02-09 RX ORDER — ACETAMINOPHEN 500 MG
500 TABLET ORAL EVERY 8 HOURS PRN
COMMUNITY
End: 2023-08-10

## 2023-02-09 RX ORDER — PANTOPRAZOLE SODIUM 40 MG/1
40 TABLET, DELAYED RELEASE ORAL DAILY
Qty: 5 TABLET | Refills: 0 | Status: CANCELLED | OUTPATIENT
Start: 2023-02-09 | End: 2023-02-14

## 2023-02-09 RX ORDER — CHOLECALCIFEROL (VITAMIN D3) 25 MCG
2000 TABLET ORAL DAILY
Status: DISCONTINUED | OUTPATIENT
Start: 2023-02-10 | End: 2023-02-10 | Stop reason: HOSPADM

## 2023-02-09 RX ORDER — PREDNISONE 50 MG/1
1250 TABLET ORAL DAILY
Qty: 75 TABLET | Refills: 0 | Status: CANCELLED | OUTPATIENT
Start: 2023-02-09 | End: 2023-02-12

## 2023-02-09 RX ORDER — PSEUDOEPHEDRINE HCL 30 MG
30 TABLET ORAL DAILY PRN
Status: ON HOLD | COMMUNITY
End: 2023-02-10 | Stop reason: HOSPADM

## 2023-02-09 RX ORDER — IBUPROFEN 200 MG
200 TABLET ORAL EVERY 8 HOURS PRN
Status: ON HOLD | COMMUNITY
End: 2023-02-10 | Stop reason: HOSPADM

## 2023-02-09 RX ADMIN — PANTOPRAZOLE SODIUM 40 MG: 40 TABLET, DELAYED RELEASE ORAL at 08:02

## 2023-02-09 RX ADMIN — HYDROXYZINE HYDROCHLORIDE 25 MG: 25 TABLET, FILM COATED ORAL at 11:02

## 2023-02-09 RX ADMIN — CEFTRIAXONE 1 G: 1 INJECTION, POWDER, FOR SOLUTION INTRAMUSCULAR; INTRAVENOUS at 08:02

## 2023-02-09 RX ADMIN — METHOCARBAMOL 500 MG: 500 TABLET ORAL at 11:02

## 2023-02-09 RX ADMIN — METHYLPREDNISOLONE SODIUM SUCCINATE 1000 MG: 1 INJECTION, POWDER, LYOPHILIZED, FOR SOLUTION INTRAMUSCULAR; INTRAVENOUS at 11:02

## 2023-02-09 NOTE — PROGRESS NOTES
[facility] - Emergency Dept  [facility] Medicine  Progress Note    Patient Name: Zeny Charles  MRN: [id number]  Patient Class: IP- Inpatient   Admission Date: 2/8/2023  Length of Stay: 1 days  Attending Physician: Fran Hunt MD  Primary Care Provider: Chris Rendon MD        Subjective:     Principal Problem:Demyelinating disease        HPI:  Zeny Charles is a 22 y.o. female with a PMHx of obesity, seizures, factor V Leiden mutation, ADHD, and demyelinating disease who presents to the ED for steroids for possible MS flare. The patient reports recurrent paresthesias since August after returning from [place]. She states the paresthesias were initially in her feet but are currently affecting her hands and lower back. She notes mild weakness in her hands due to decreased sensation, stating she has issues with gripping items at times. The patient denies any weakness in her legs and is able to ambulate independently but feels off balance at times. She was evaluated by neurology Dr. Marcela Rendon who obtained MRI brain and cervical spine that revealed active demyelinating disease. She planned to treat the patient with 1250 mg of prednisone for 5 days with GI prophylaxis as an outpatient. Also pending lumbar puncture. The patient and the mom are uncomfortable with this plan taking them any feels as an outpatient and came to the ER. Patient endorses anxiety.The patient denies myalgias, headache, fever, chills, SOB, cough, abdominal pain, N/V/D, or dysuria.     In the ED, pt mildly tachycardic but otherwise VSSAF. CBC and CMP unremarkable. The patient received 1,000mg IV solumedrol. Neurology was consulted in the ED.      Overview/Hospital Course:  2/9 UA + . UC pending. started on IV ceftriaxone. No need of inpatient lumbar puncture per neurology. started diet . switch to oral steroids 1g daily x 5 days  neurology recs follow up with neuro immunology  2 weeks post discharge. PT/OT consulted started on cholecalciferaol for  vitamin D deficiency         Review of Systems:   Pain scale:   Constitutional:  fever,  chills, headache, vision loss, hearing loss, weight loss, Generalized weakness, falls, loss of smell, loss of taste, poor appetite,  sore throat  Respiratory: cough, shortness of breath.   Cardiovascular: chest pain, dizziness, palpitations, orthopnea, swelling of feet, syncope  Gastrointestinal: nausea, vomiting, abdominal pain, diarrhea, black stool,  blood in stool, change in bowel habits  Genitourinary: hematuria, dysuria, urgency, frequency  Integument/Breast: rash,  pruritis  Hematologic/Lymphatic: easy bruising, lymphadenopathy  Musculoskeletal: arthralgias , myalgias, back pain, neck pain, knee pain, gait problem   Neurological: confusion, seizures, tremors, slurred speech,  numbness - paresthesias to bilateral hands  Behavioral/Psych:  depression, anxiety, auditory or visual hallucinations     OBJECTIVE:     Physical Exam:  Body mass index is 34.75 kg/m².    Constitutional: Appears well-developed and well-nourished. obesity   Head: Normocephalic and atraumatic.   Neck: Normal range of motion. Neck supple.   Cardiovascular: Normal heart rate.  Regular heart rhythm.  Pulmonary/Chest: Effort normal.   Abdominal: No distension.  No tenderness  Musculoskeletal: Normal range of motion. No edema.   Neurological: Alert and oriented to person, place, and time.   Skin: Skin is warm and dry.   Psychiatric: Normal mood and affect. Behavior is normal.                  Vital Signs  Temp: 97.6 °F (36.4 °C) (02/09/23 1604)  Pulse: 94 (02/09/23 1604)  Resp: 19 (02/09/23 1604)  BP: 127/77 (02/09/23 1604)  SpO2: 98 % (02/09/23 1604)     24 Hour VS Range    Temp:  [97.6 °F (36.4 °C)-98.5 °F (36.9 °C)]   Pulse:  []   Resp:  [15-19]   BP: (111-127)/(72-82)   SpO2:  [98 %-99 %]     Intake/Output Summary (Last 24 hours) at 2/9/2023 1927  Last data filed at 2/9/2023 2374  Gross per 24 hour   Intake 50 ml   Output no documentation   Net 50  ml         I/O This Shift:  No intake/output data recorded.    Wt Readings from Last 3 Encounters:   02/08/23 86.2 kg (190 lb)   01/24/23 86.2 kg (190 lb)   01/20/23 86.3 kg (190 lb 4.1 oz)       I have personally reviewed the vitals and recorded Intake/Output     Laboratory/Diagnostic Data:    CBC/Anemia Labs: Coags:    Recent Labs   Lab 02/03/23  1303 02/08/23  1805 02/09/23 0317   WBC  --  10.68 8.24   HGB  --  15.4 14.0   HCT  --  46.6 42.4   PLT  --  311 272   MCV  --  94 93   RDW  --  11.9 11.6   [id number]  --   --     No results for input(s): PT, INR, APTT in the last 168 hours.     Chemistries: ABG:   Recent Labs   Lab 02/08/23  1805 02/09/23 0317    139   K 3.8 3.8    106   CO2 23 19*   BUN 8 8   CREATININE 0.8 0.7   CALCIUM 9.7 9.4   PROT 8.5* 7.6   BILITOT 0.3 0.3   ALKPHOS 66 59   ALT 22 23   AST 16 15   MG  --  1.8    No results for input(s): PH, PCO2, PO2, HCO3, POCSATURATED, BE in the last 168 hours.     POCT Glucose: HbA1c:    No results for input(s): POCTGLUCOSE in the last 168 hours. Hemoglobin A1C   Date Value Ref Range Status   09/22/2022 4.8 4.0 - 5.6 % Final     Comment:     ADA Screening Guidelines:  5.7-6.4%  Consistent with prediabetes  >or=6.5%  Consistent with diabetes    High levels of fetal hemoglobin interfere with the HbA1C  assay. Heterozygous hemoglobin variants (HbS, HgC, etc)do  not significantly interfere with this assay.   However, presence of multiple variants may affect accuracy.     03/17/2021 4.7 4.0 - 5.6 % Final     Comment:     ADA Screening Guidelines:  5.7-6.4%  Consistent with prediabetes  >or=6.5%  Consistent with diabetes    High levels of fetal hemoglobin interfere with the HbA1C  assay. Heterozygous hemoglobin variants (HbS, HgC, etc)do  not significantly interfere with this assay.   However, presence of multiple variants may affect accuracy.     04/18/2017 5.0 4.5 - 6.2 % Final     Comment:     According to ADA guidelines, hemoglobin A1C <7.0%  represents  optimal control in non-pregnant diabetic patients.  Different  metrics may apply to specific populations.   Standards of Medical Care in Diabetes - 2016.  For the purpose of screening for the presence of diabetes:  <5.7%     Consistent with the absence of diabetes  5.7-6.4%  Consistent with increasing risk for diabetes   (prediabetes)  >or=6.5%  Consistent with diabetes  Currently no consensus exists for use of hemoglobin A1C  for diagnosis of diabetes for children.          Cardiac Enzymes: Ejection Fractions:    No results for input(s): CPK, CPKMB, MB, TROPONINI in the last 72 hours. No results found for: EF       Recent Labs   Lab 02/08/23 1908   COLORU Yellow   APPEARANCEUA Hazy*   PHUR 6.0   SPECGRAV 1.025   PROTEINUA Negative   GLUCUA Negative   KETONESU Negative   BILIRUBINUA Negative   OCCULTUA Negative   NITRITE Negative   LEUKOCYTESUR 2+*   RBCUA 3   WBCUA 99*   BACTERIA Few*   SQUAMEPITHEL 29       No results found for: PROCAL, LACTATE  No results found for: BNP  CRP (mg/L)   Date Value   01/24/2023 8.6 (H)     Sed Rate (mm/Hr)   Date Value   02/03/2023 35 (H)   01/24/2023 32     No results found for: DDIMER  No results found for: FERRITIN  No results found for: LDH  CPK (U/L)   Date Value   01/24/2023 46     Results for orders placed or performed in visit on 02/03/23   Vitamin D   Result Value Ref Range    Vit D, 25-Hydroxy 19 (L) 30 - 96 ng/mL     No results found for: [id number]    Microbiology labs for the last week  Microbiology Results (last 7 days)       Procedure Component Value Units Date/Time    Urine culture [[id number]] Collected: 02/08/23 1908    Order Status: No result Specimen: Urine Updated: 02/08/23 2300            Reviewed and noted in plan where applicable- Please see chart for full lab data.    Lines/Drains:       Peripheral IV - Single Lumen 02/08/23 1909 22 G Anterior;Right Hand (Active)   Site Assessment Clean;Dry;Intact;No redness;No swelling 02/08/23 1935   Extremity  Assessment Distal to IV No abnormal discoloration;No redness;No swelling;No warmth 02/08/23 1935   Line Status Flushed;Saline locked 02/08/23 1935   Dressing Status Clean;Dry;Intact 02/08/23 1935   Dressing Intervention Integrity maintained 02/08/23 1935   Dressing Change Due 02/12/23 02/08/23 1935   Site Change Due 02/12/23 02/08/23 1935   Reason Not Rotated Not due 02/08/23 1935   Number of days: 0       Imaging      No results found for this or any previous visit.      MRI Cervical Spine Demyelinating W W/O Contrast  Narrative: EXAMINATION:  MRI CERVICAL SPINE DEMYELINATING W W/O CONTRAST    CLINICAL HISTORY:  Demyelinating disease of central nervous system, unspecified    TECHNIQUE:  Pre and postcontrast MRI of the cervical spine prior to and following administration of 9 cc Gadavist.    COMPARISON:  Similar study 02/01/2023    FINDINGS:  Skull base and craniocervical junction not included in the field of view.    Normal vertebral body heights, marrow signal and intervertebral discs.  Normal posterior elements.    Redemonstration and unchanged appearance of spinal cord expansion and increased T2 signal posterior to C3-4 and patchy increased T2 signal with minimal cord expansion posterior to the C6-7.  After contrast administration, or there may be faint areas of contrast enhancement along the periphery of the spinal cord and within the central canal at C3 and C4 and C6-7 respectively suggesting possible active demyelination.    No prevertebral soft tissue abnormalities.  Visualized portions of the upper thoracic spinal cord are unremarkable.  Impression: Findings in keeping with patient's known demyelinating disease with faint areas of enhancement in the spinal cord that may reflect active demyelination.    Electronically signed by: Arnav Valencia Jr  Date:    02/08/2023  Time:    10:29  MRI Brain Demyelinating W W/O Contrast  Narrative: EXAMINATION:  MRI BRAIN DEMYELINATING W/ WO CONTRAST    CLINICAL  HISTORY:  Demyelinating disease of central nervous system, unspecified    TECHNIQUE:  Multiplanar multisequence MR imaging of the brain was performed before and after the administration of 10 mL Gadavist intravenous contrast.    COMPARISON:  MRI 11/02/2016    FINDINGS:  Intracranial Compartment:    Within the cerebral white matter there are scattered areas punctate T2 hyperintense signal few of which are in a pericallosal distribution, prominent in the left temporal region.  No lesions are present in the brainstem or cerebellum.  Overall plaque burden is mild to moderateworse compared with the prior study.    After contrast administration there is patchy enhancement of the white matter of the medial left temporal lobe and punctate foci of enhancement of at least 3 white matter lesions in the right cerebral hemisphere and faint enhancement of some lesions in the left cerebral hemisphere compatible with areas of active demyelination.    There is overall no significant white matter volume loss.    Skull/Extracranial Contents (limited evaluation): Bone marrow signal intensity is normal.  Impression: Active demyelinating disease as described progressed since the prior exam.    Electronically signed by: Arnav Valencia Jr  Date:    02/08/2023  Time:    10:10      Labs, Imaging, EKG and Diagnostic results from 2/9/2023 were reviewed.    Medications:  Medication list was reviewed and changes noted under Assessment/Plan.  No current facility-administered medications on file prior to encounter.     Current Outpatient Medications on File Prior to Encounter   Medication Sig Dispense Refill    acetaminophen (TYLENOL) 500 MG tablet Take 500 mg by mouth every 8 (eight) hours as needed for Pain.      ibuprofen (ADVIL,MOTRIN) 200 MG tablet Take 200 mg by mouth every 8 (eight) hours as needed for Pain.      pseudoephedrine (SUDAFED) 30 MG tablet Take 30 mg by mouth daily as needed for Congestion.      MOUNJARO 2.5 mg/0.5 mL PnIj  Inject 2.5 mg into the skin every 7 days.      pulse oximeter (PULSE OXIMETER) device by Apply Externally route 2 (two) times a day. Use twice daily at 8 AM and 3 PM and [facility] as directed. 1 each 0    [DISCONTINUED] dextroamphetamine-amphetamine 10 mg Tab Take 1 tablet (10 mg total) by mouth 2 (two) times daily. 60 tablet 0    [DISCONTINUED] dextroamphetamine-amphetamine 10 mg Tab Take 1 tablet (10 mg total) by mouth 2 (two) times daily. 60 tablet 0    [DISCONTINUED] dextroamphetamine-amphetamine 10 mg Tab Take 1 tablet (10 mg total) by mouth 2 (two) times daily. 60 tablet 0    [DISCONTINUED] methylPREDNISolone (MEDROL DOSEPACK) 4 mg tablet use as directed 21 each 0    [DISCONTINUED] pantoprazole (PROTONIX) 40 MG tablet Take 1 tablet (40 mg total) by mouth once daily for 5 days 5 tablet 0    [DISCONTINUED] predniSONE (DELTASONE) 50 MG Tab Take 25 tablets (1,250 mg total) by mouth once daily for 5 days 125 tablet 0     Scheduled Medications:  cefTRIAXone (ROCEPHIN) IVPB, 1 g, Intravenous, Q24H  methylPREDNISolone sodium succinate injection, 1,000 mg, Intravenous, Q24H  pantoprazole, 40 mg, Oral, Daily  [START ON 2/10/2023] vitamin D, 2,000 Units, Oral, Daily      PRN: acetaminophen, dextrose 10%, dextrose 10%, glucagon (human recombinant), glucose, glucose, hydrOXYzine HCL, methocarbamoL, ondansetron, sodium chloride 0.9%  Infusions:   Estimated Creatinine Clearance: 130.5 mL/min (based on SCr of 0.7 mg/dL).    Assessment/Plan:      * Demyelinating disease  Paresthesia  -Labs unremarkable.  -Reviewed outpatient MRI brain and cerv spine demyelinating w/wo contrast.  -Continue solumedrol 1g IV q 24hrs x5 doses.  -Continue PPI daily for GI prophylaxis.   -NPO after midnight for possible LP.  -Neurology consulted, appreciate recs.  2/9No need of inpatient umbar puncture per neurology. started diet.  switch to oral steroids 1g daily x 5 days  neurology recs follow up with neuro immunology  2 weeks  post discharge    Vitamin D deficiency  14. started on cholecalciferaol for vitamin D deficiency       UTI (urinary tract infection)  2/9 UA + . UC pending. started on IV ceftriaxone.     Paresthesia    as above    ADHD (attention deficit hyperactivity disorder), inattentive type  -On adderall outpatient, resume at discharge.    Class 2 obesity due to excess calories without serious comorbidity with body mass index (BMI) of 37.0 to 37.9 in adult  Body mass index is 34.75 kg/m². Obesity complicates all aspects of disease management from diagnostic modalities to treatment.     Seizure disorder  -History of seizure as a child, no recurrence and not on AEDs.      VTE Risk Mitigation (From admission, onward)           Ordered     Reason for No Pharmacological VTE Prophylaxis  Once        Question:  Reasons:  Answer:  Risk of Bleeding    02/08/23 1902     IP VTE HIGH RISK PATIENT  Once         02/08/23 1902     Place sequential compression device  Until discontinued         02/08/23 1902                    Discharge Planning   BEE: 2/13/2023     Code Status: Full Code   Is the patient medically ready for discharge?: No    Reason for patient still in hospital (select all that apply): Treatment                     Francis Byrd MD  Department of Hospital Medicine   [facility] - Emergency Dept

## 2023-02-09 NOTE — ASSESSMENT & PLAN NOTE
Body mass index is 34.75 kg/m². Obesity complicates all aspects of disease management from diagnostic modalities to treatment.

## 2023-02-09 NOTE — HOSPITAL COURSE
2/9 UA + . UC pending. started on IV ceftriaxone. No need of inpatient lumbar puncture per neurology. started diet . switch to oral steroids 1g daily x 5 days  neurology recs follow up with neuro immunology  2 weeks post discharge. PT/OT consulted started on cholecalciferaol for vitamin D deficiency   2/10 leucocytosis of 14. likely from steroids . UC with Multiple organisms isolated. None in predominance. ceftriaxone discontinued. discharge with oral steroids x 3 days

## 2023-02-09 NOTE — PLAN OF CARE
Zeny Charles is a 22 y.o. female with a PMHx of obesity, seizures, factor V Leiden mutation, and ADHD who [place] [facility] [place] with symptoms of b/l hand numbness and mid-back numbness. The patient reports that she first had symptoms in August, consisting of LE bilateral numbness of her feet, legs and trunk. The patient then, in October, had acute onset bilateral hand numbness. She now has exacerbation of her prior hand numbness, as desribed above. Patient denies any    Prior to arrival to the ED, patient had MRI C spine completed - which demonstrated findings concerning for demyelinating disease. The patient was told by her provider, Dr Rendon, to start 1g Oral Methylprednisone for 5 days. Patient had not taken any prior to arrival to ED.     Repeat MRI of neuroaxis revealed several chronic lesions, both in spinal cord and brain consistent with demyelinating disease. Additionally demonstrated contrast enhancement suggestive of active disease - warranting treatment with steroids.  Discussed diagnosis of demyelinating disease with patient and family in room. Discussed need for acute treatment with steroids for five days - given contrast enhacing lesions. Discussed need for possible PT after leaving hospital. Discussed need for outpatient follow up after leaving hospital    Patient and family voiced their preference to leave the hospital sooner, rather than later.   Discussed that steroids may be administered either IV in the hospital or orally, via steroid smoothie, at home.  Discussed this with primary team who voiced understanding     Recommendations:   - Solumedrol 1g x5 days (can be given IV or orally via steroid smoothie) (can be completed either in inpatien or outpatient setting)    - No steroid taper required    - Patient to follow up in neuroimmunology clinic, order placed    - Patient told to call or message should she not get call for scheduling this appointment in next few days    - Patient and family voiced  understanding of and agreement with this plan as outlined    Neurology team will sign off at this time  Please contact with any questions or concerns    Nolan Gold MD  Neurology Resident PGY4

## 2023-02-09 NOTE — SUBJECTIVE & OBJECTIVE
Past Medical History:   Diagnosis Date    Factor 5 Leiden mutation, heterozygous 9/13/2016    Seizures     1 at the age of 10       Past Surgical History:   Procedure Laterality Date    TONSILLECTOMY  6 years old       Review of patient's allergies indicates:  No Known Allergies    No current facility-administered medications on file prior to encounter.     Current Outpatient Medications on File Prior to Encounter   Medication Sig    dextroamphetamine-amphetamine 10 mg Tab Take 1 tablet (10 mg total) by mouth 2 (two) times daily.    dextroamphetamine-amphetamine 10 mg Tab Take 1 tablet (10 mg total) by mouth 2 (two) times daily.    dextroamphetamine-amphetamine 10 mg Tab Take 1 tablet (10 mg total) by mouth 2 (two) times daily.    methylPREDNISolone (MEDROL DOSEPACK) 4 mg tablet use as directed    pantoprazole (PROTONIX) 40 MG tablet Take 1 tablet (40 mg total) by mouth once daily for 5 days    predniSONE (DELTASONE) 50 MG Tab Take 25 tablets (1,250 mg total) by mouth once daily for 5 days    pulse oximeter (PULSE OXIMETER) device by Apply Externally route 2 (two) times a day. Use twice daily at 8 AM and 3 PM and record the value in [employer] as directed.    [DISCONTINUED] gabapentin (NEURONTIN) 300 MG capsule Take 1 capsule (300 mg total) by mouth 3 (three) times daily. Start 1 capsule 300mg nightly x 1 week and titrate up to TID as tolerated for neuropathic pain (Patient not taking: Reported on 1/24/2023)     Family History       Problem Relation (Age of Onset)    Congenital heart disease Maternal Grandfather    Crohn's disease Paternal Grandmother    Factor V Leiden deficiency Mother, Sister, Maternal Grandmother    Hypertension Mother    Interstitial cystitis Mother, Maternal Aunt    Irregular menses Mother    Mitral valve prolapse Maternal Grandfather    No Known Problems Father    Thyroid disease Maternal Grandmother          Tobacco Use    Smoking status: Never    Smokeless tobacco: Never   Substance and Sexual  Activity    Alcohol use: No    Drug use: No    Sexual activity: Never     Review of Systems   Constitutional:  Negative for appetite change, chills, diaphoresis, fatigue and fever.   HENT:  Negative for congestion, rhinorrhea and sore throat.    Eyes:  Negative for photophobia and visual disturbance.   Respiratory:  Negative for cough, shortness of breath and wheezing.    Cardiovascular:  Negative for chest pain, palpitations and leg swelling.   Gastrointestinal:  Negative for abdominal distention, abdominal pain, diarrhea, nausea and vomiting.   Genitourinary:  Negative for difficulty urinating, dysuria and frequency.   Musculoskeletal:  Positive for gait problem (Off balance at times). Negative for arthralgias, back pain, myalgias and neck pain.   Skin:  Negative for color change, pallor, rash and wound.   Neurological:  Positive for weakness and numbness (paresthesias to bilateral hands and back). Negative for dizziness, syncope, light-headedness and headaches.   Psychiatric/Behavioral:  Negative for confusion and sleep disturbance. The patient is nervous/anxious.    Objective:     Vital Signs (Most Recent):  Temp: 99.1 °F (37.3 °C) (02/08/23 1552)  Pulse: (!) 111 (02/08/23 1552)  Resp: 16 (02/08/23 1552)  BP: (!) 136/97 (02/08/23 1552)  SpO2: 99 % (02/08/23 1552)   Vital Signs (24h Range):  Temp:  [99.1 °F (37.3 °C)] 99.1 °F (37.3 °C)  Pulse:  [111] 111  Resp:  [16] 16  SpO2:  [99 %] 99 %  BP: (136)/(97) 136/97     Weight: 86.2 kg (190 lb)  Body mass index is 34.75 kg/m².    Physical Exam  Vitals and nursing note reviewed.   Constitutional:       General: She is not in acute distress.     Appearance: She is obese. She is not ill-appearing, toxic-appearing or diaphoretic.   HENT:      Head: Normocephalic and atraumatic.      Nose: Nose normal.      Mouth/Throat:      Mouth: Mucous membranes are moist.   Eyes:      Pupils: Pupils are equal, round, and reactive to light.   Cardiovascular:      Rate and Rhythm:  Normal rate and regular rhythm.      Pulses: Normal pulses.      Heart sounds: No murmur heard.  Pulmonary:      Effort: Pulmonary effort is normal. No respiratory distress.      Breath sounds: No wheezing, rhonchi or rales.   Abdominal:      General: Bowel sounds are normal. There is no distension.      Palpations: Abdomen is soft.      Tenderness: There is no abdominal tenderness. There is no guarding.   Musculoskeletal:         General: No swelling, tenderness or deformity. Normal range of motion.      Cervical back: Normal range of motion. No tenderness.   Skin:     General: Skin is warm and dry.      Capillary Refill: Capillary refill takes less than 2 seconds.   Neurological:      General: No focal deficit present.      Mental Status: She is alert and oriented to person, place, and time.      Sensory: Sensory deficit (bilateral hands) present.      Motor: No weakness.   Psychiatric:         Mood and Affect: Mood normal.         Behavior: Behavior normal.         CRANIAL NERVES     CN III, IV, VI   Pupils are equal, round, and reactive to light.     Significant Labs: All pertinent labs within the past 24 hours have been reviewed.  CBC:   Recent Labs   Lab 02/08/23  1805   WBC 10.68   HGB 15.4   HCT 46.6        CMP:   Recent Labs   Lab 02/08/23  1805      K 3.8      CO2 23   GLU 79   BUN 8   CREATININE 0.8   CALCIUM 9.7   PROT 8.5*   ALBUMIN 4.2   BILITOT 0.3   ALKPHOS 66   AST 16   ALT 22   ANIONGAP 12       Significant Imaging: I have reviewed all pertinent imaging results/findings within the past 24 hours.

## 2023-02-09 NOTE — CONSULTS
Interventional Radiology consulted for lumbar puncture. Reached out to Hospital Team N to place order for lumbar puncture under fluoroscopic guidance. Also recommended attempting two times on the floor, either with neurology or anesthesiology.     Please let us know if you have any further questions.     Lor Jeff MD  Radiology, PGY II  [facility]

## 2023-02-09 NOTE — HPI
Zeny Charles is a 22 y.o. female with a PMHx of obesity, seizures, factor V Leiden mutation, ADHD, and demyelinating disease who presents to the ED for steroids for possible MS flare. The patient reports recurrent paresthesias since August after returning from [place]. She states the paresthesias were initially in her feet but are currently affecting her hands and lower back. She notes mild weakness in her hands due to decreased sensation, stating she has issues with gripping items at times. The patient denies any weakness in her legs and is able to ambulate independently but feels off balance at times. She was evaluated by neurology Dr. Marcela Rendon who obtained MRI brain and cervical spine that revealed active demyelinating disease. She planned to treat the patient with 1250 mg of prednisone for 5 days with GI prophylaxis as an outpatient. Also pending lumbar puncture. The patient and the mom are uncomfortable with this plan taking them any feels as an outpatient and came to the ER. Patient endorses anxiety.The patient denies myalgias, headache, fever, chills, SOB, cough, abdominal pain, N/V/D, or dysuria.     In the ED, pt mildly tachycardic but otherwise VSSAF. CBC and CMP unremarkable. The patient received 1,000mg IV solumedrol. Neurology was consulted in the ED.

## 2023-02-09 NOTE — ASSESSMENT & PLAN NOTE
Paresthesia  -Labs unremarkable.  -Reviewed outpatient MRI brain and cerv spine demyelinating w/wo contrast.  -Continue solumedrol 1g IV q 24hrs x5 doses.  -Continue PPI daily for GI prophylaxis.   -NPO after midnight for possible LP.  -Neurology consulted, appreciate recs.  2/9No need of inpatient umbar puncture per neurology. started diet.  switch to oral steroids 1g daily x 5 days  neurology recs follow up with neuro immunology  2 weeks post discharge

## 2023-02-09 NOTE — ASSESSMENT & PLAN NOTE
Paresthesia  -Labs unremarkable.  -Reviewed outpatient MRI brain and cerv spine demyelinating w/wo contrast.  -Continue solumedrol 1g IV q 24hrs x5 doses.  -Continue PPI daily for GI prophylaxis.   -NPO after midnight for possible LP.  -Neurology consulted, appreciate recs.

## 2023-02-09 NOTE — H&P
"[facility] - Emergency Dept  [facility] Medicine  History & Physical    Patient Name: Zeny Charles  MRN: [id number]  Patient Class: IP- Inpatient  Admission Date: 2/8/2023  Attending Physician: Fran Hunt MD   Primary Care Provider: Chris Rendon MD         Patient information was obtained from patient, past medical records, and ER records.     Subjective:     Principal Problem:Demyelinating disease    Chief Complaint:   Chief Complaint   Patient presents with    Numbness     Pt states she was sent by neurology for steroids.  States "she thinks I have MS".  Pt has hand and feet numbness        HPI: Zeny Charles is a 22 y.o. female with a PMHx of obesity, seizures, factor V Leiden mutation, ADHD, and demyelinating disease who presents to the ED for steroids for possible MS flare. The patient reports recurrent paresthesias since August after returning from [place]. She states the paresthesias were initially in her feet but are currently affecting her hands and lower back. She notes mild weakness in her hands due to decreased sensation, stating she has issues with gripping items at times. The patient denies any weakness in her legs and is able to ambulate independently but feels off balance at times. She was evaluated by neurology Dr. Marcela Rendon who obtained MRI brain and cervical spine that revealed active demyelinating disease. She planned to treat the patient with 1250 mg of prednisone for 5 days with GI prophylaxis as an outpatient. Also pending lumbar puncture. The patient and the mom are uncomfortable with this plan taking them any feels as an outpatient and came to the ER. Patient endorses anxiety.The patient denies myalgias, headache, fever, chills, SOB, cough, abdominal pain, N/V/D, or dysuria.     In the ED, pt mildly tachycardic but otherwise VSSAF. CBC and CMP unremarkable. The patient received 1,000mg IV solumedrol. Neurology was consulted in the ED.    Past Medical History:   Diagnosis Date    Factor " 5 Leiden mutation, heterozygous 9/13/2016    Seizures     1 at the age of 10       Past Surgical History:   Procedure Laterality Date    TONSILLECTOMY  6 years old       Review of patient's allergies indicates:  No Known Allergies    No current facility-administered medications on file prior to encounter.     Current Outpatient Medications on File Prior to Encounter   Medication Sig    dextroamphetamine-amphetamine 10 mg Tab Take 1 tablet (10 mg total) by mouth 2 (two) times daily.    dextroamphetamine-amphetamine 10 mg Tab Take 1 tablet (10 mg total) by mouth 2 (two) times daily.    dextroamphetamine-amphetamine 10 mg Tab Take 1 tablet (10 mg total) by mouth 2 (two) times daily.    methylPREDNISolone (MEDROL DOSEPACK) 4 mg tablet use as directed    pantoprazole (PROTONIX) 40 MG tablet Take 1 tablet (40 mg total) by mouth once daily for 5 days    predniSONE (DELTASONE) 50 MG Tab Take 25 tablets (1,250 mg total) by mouth once daily for 5 days    pulse oximeter (PULSE OXIMETER) device by Apply Externally route 2 (two) times a day. Use twice daily at 8 AM and 3 PM and record the value in [email or web address] as directed.    [DISCONTINUED] gabapentin (NEURONTIN) 300 MG capsule Take 1 capsule (300 mg total) by mouth 3 (three) times daily. Start 1 capsule 300mg nightly x 1 week and titrate up to TID as tolerated for neuropathic pain (Patient not taking: Reported on 1/24/2023)     Family History       Problem Relation (Age of Onset)    Congenital heart disease Maternal Grandfather    Crohn's disease Paternal Grandmother    Factor V Leiden deficiency Mother, Sister, Maternal Grandmother    Hypertension Mother    Interstitial cystitis Mother, Maternal Aunt    Irregular menses Mother    Mitral valve prolapse Maternal Grandfather    No Known Problems Father    Thyroid disease Maternal Grandmother          Tobacco Use    Smoking status: Never    Smokeless tobacco: Never   Substance and Sexual Activity    Alcohol use: No    Drug use: No     Sexual activity: Never     Review of Systems   Constitutional:  Negative for appetite change, chills, diaphoresis, fatigue and fever.   HENT:  Negative for congestion, rhinorrhea and sore throat.    Eyes:  Negative for photophobia and visual disturbance.   Respiratory:  Negative for cough, shortness of breath and wheezing.    Cardiovascular:  Negative for chest pain, palpitations and leg swelling.   Gastrointestinal:  Negative for abdominal distention, abdominal pain, diarrhea, nausea and vomiting.   Genitourinary:  Negative for difficulty urinating, dysuria and frequency.   Musculoskeletal:  Positive for gait problem (Off balance at times). Negative for arthralgias, back pain, myalgias and neck pain.   Skin:  Negative for color change, pallor, rash and wound.   Neurological:  Positive for weakness and numbness (paresthesias to bilateral hands and back). Negative for dizziness, syncope, light-headedness and headaches.   Psychiatric/Behavioral:  Negative for confusion and sleep disturbance. The patient is nervous/anxious.    Objective:     Vital Signs (Most Recent):  Temp: 99.1 °F (37.3 °C) (02/08/23 1552)  Pulse: (!) 111 (02/08/23 1552)  Resp: 16 (02/08/23 1552)  BP: (!) 136/97 (02/08/23 1552)  SpO2: 99 % (02/08/23 1552)   Vital Signs (24h Range):  Temp:  [99.1 °F (37.3 °C)] 99.1 °F (37.3 °C)  Pulse:  [111] 111  Resp:  [16] 16  SpO2:  [99 %] 99 %  BP: (136)/(97) 136/97     Weight: 86.2 kg (190 lb)  Body mass index is 34.75 kg/m².    Physical Exam  Vitals and nursing note reviewed.   Constitutional:       General: She is not in acute distress.     Appearance: She is obese. She is not ill-appearing, toxic-appearing or diaphoretic.   HENT:      Head: Normocephalic and atraumatic.      Nose: Nose normal.      Mouth/Throat:      Mouth: Mucous membranes are moist.   Eyes:      Pupils: Pupils are equal, round, and reactive to light.   Cardiovascular:      Rate and Rhythm: Normal rate and regular rhythm.      Pulses: Normal  pulses.      Heart sounds: No murmur heard.  Pulmonary:      Effort: Pulmonary effort is normal. No respiratory distress.      Breath sounds: No wheezing, rhonchi or rales.   Abdominal:      General: Bowel sounds are normal. There is no distension.      Palpations: Abdomen is soft.      Tenderness: There is no abdominal tenderness. There is no guarding.   Musculoskeletal:         General: No swelling, tenderness or deformity. Normal range of motion.      Cervical back: Normal range of motion. No tenderness.   Skin:     General: Skin is warm and dry.      Capillary Refill: Capillary refill takes less than 2 seconds.   Neurological:      General: No focal deficit present.      Mental Status: She is alert and oriented to person, place, and time.      Sensory: Sensory deficit (bilateral hands) present.      Motor: No weakness.   Psychiatric:         Mood and Affect: Mood normal.         Behavior: Behavior normal.         CRANIAL NERVES     CN III, IV, VI   Pupils are equal, round, and reactive to light.     Significant Labs: All pertinent labs within the past 24 hours have been reviewed.  CBC:   Recent Labs   Lab 02/08/23  1805   WBC 10.68   HGB 15.4   HCT 46.6        CMP:   Recent Labs   Lab 02/08/23  1805      K 3.8      CO2 23   GLU 79   BUN 8   CREATININE 0.8   CALCIUM 9.7   PROT 8.5*   ALBUMIN 4.2   BILITOT 0.3   ALKPHOS 66   AST 16   ALT 22   ANIONGAP 12       Significant Imaging: I have reviewed all pertinent imaging results/findings within the past 24 hours.      Assessment/Plan:     * Demyelinating disease  Paresthesia  -Labs unremarkable.  -Reviewed outpatient MRI brain and cerv spine demyelinating w/wo contrast.  -Continue solumedrol 1g IV q 24hrs x5 doses.  -Continue PPI daily for GI prophylaxis.   -NPO after midnight for possible LP.  -Neurology consulted, appreciate recs.    ADHD (attention deficit hyperactivity disorder), inattentive type  -On adderall outpatient, resume at  discharge.    Class 2 obesity due to excess calories without serious comorbidity with body mass index (BMI) of 37.0 to 37.9 in adult  Body mass index is 34.75 kg/m². Obesity complicates all aspects of disease management from diagnostic modalities to treatment.     Seizure disorder  -History of seizure as a child, no recurrence and not on AEDs.      VTE Risk Mitigation (From admission, onward)           Ordered     Reason for No Pharmacological VTE Prophylaxis  Once        Question:  Reasons:  Answer:  Risk of Bleeding    02/08/23 1902     IP VTE HIGH RISK PATIENT  Once         02/08/23 1902     Place sequential compression device  Until discontinued         02/08/23 1902                       Nuria Heard NP  Department of Hospital Medicine   [facility] - Emergency Dept

## 2023-02-09 NOTE — PHARMACY MED REC
"Admission Medication History     The home medication history was taken by Loan Payne.    You may go to "Admission" then "Reconcile Home Medications" tabs to review and/or act upon these items.     The home medication list has been updated by the Pharmacy department.   Please read ALL comments highlighted in yellow.   Please address this information as you see fit.    Feel free to contact us if you have any questions or require assistance.      The medications listed below were removed from the home medication list. Please reorder if appropriate:  Patient reports no longer taking the following medication(s):  DEXTROAMPHETAMINE-AMPHETAMINE 10 MG  METHYLPREDNISOLONE 4 MG  PANTOPRAZOLE 40 MG  PREDNISONE 50 MG    Medications listed below were obtained from: Patient/family      Current Outpatient Medications on File Prior to Encounter   Medication Sig    acetaminophen (TYLENOL) 500 MG tablet   Take 500 mg by mouth every 8 (eight) hours as needed for Pain.    ibuprofen (ADVIL,MOTRIN) 200 MG tablet   Take 200 mg by mouth every 8 (eight) hours as needed for Pain.    pseudoephedrine (SUDAFED) 30 MG tablet   Take 30 mg by mouth daily as needed for Congestion.    MOUNJARO 2.5 mg/0.5 mL PnIj   Inject 2.5 mg into the skin every 7 days. (Reported Not Takin2023)    pulse oximeter (PULSE OXIMETER) device by Apply Externally route 2 (two) times a day. Use twice daily at 8 AM and 3 PM and record the value in MyChart as directed.         Potential issues to be addressed PRIOR TO DISCHARGE  Patient reported not taking the following medications: (MOUNJARO 2.5 MG/ 0.5 ML). These medications remain on the home medication list. Please address accordingly.   Please discuss with the patient barriers to adherence with medication treatment plans    Loan Payne  EXT [phone]                  .        "

## 2023-02-10 ENCOUNTER — TELEPHONE (OUTPATIENT)
Dept: INTERVENTIONAL RADIOLOGY/VASCULAR | Facility: HOSPITAL | Age: 23
End: 2023-02-10
Payer: COMMERCIAL

## 2023-02-10 ENCOUNTER — HOSPITAL ENCOUNTER (EMERGENCY)
Facility: HOSPITAL | Age: 23
Discharge: HOME | End: 2023-02-11
Attending: EMERGENCY MEDICINE
Payer: COMMERCIAL

## 2023-02-10 VITALS
HEIGHT: 62 IN | RESPIRATION RATE: 18 BRPM | HEART RATE: 105 BPM | WEIGHT: 190 LBS | DIASTOLIC BLOOD PRESSURE: 63 MMHG | TEMPERATURE: 98 F | OXYGEN SATURATION: 94 % | SYSTOLIC BLOOD PRESSURE: 107 MMHG | BODY MASS INDEX: 34.96 KG/M2

## 2023-02-10 DIAGNOSIS — R06.00 DYSPNEA, UNSPECIFIED TYPE: Primary | ICD-10-CM

## 2023-02-10 DIAGNOSIS — R00.2 PALPITATIONS: ICD-10-CM

## 2023-02-10 PROBLEM — D72.829 LEUCOCYTOSIS: Status: ACTIVE | Noted: 2023-02-10

## 2023-02-10 LAB
ALBUMIN SERPL BCP-MCNC: 3.5 G/DL (ref 3.5–5.2)
ALBUMIN SERPL ELPH-MCNC: 3.96 G/DL (ref 3.35–5.55)
ALP SERPL-CCNC: 56 U/L (ref 55–135)
ALPHA1 GLOB SERPL ELPH-MCNC: 0.3 G/DL (ref 0.17–0.41)
ALPHA2 GLOB SERPL ELPH-MCNC: 1.04 G/DL (ref 0.43–0.99)
ALT SERPL W/O P-5'-P-CCNC: 26 U/L (ref 10–44)
ANION GAP SERPL CALC-SCNC: 12 MMOL/L (ref 8–16)
AST SERPL-CCNC: 15 U/L (ref 10–40)
B-GLOBULIN SERPL ELPH-MCNC: 0.87 G/DL (ref 0.5–1.1)
BACTERIA UR CULT: NORMAL
BACTERIA UR CULT: NORMAL
BASOPHILS # BLD AUTO: 0.02 K/UL (ref 0–0.2)
BASOPHILS NFR BLD: 0.1 % (ref 0–1.9)
BILIRUB SERPL-MCNC: 0.2 MG/DL (ref 0.1–1)
BUN SERPL-MCNC: 9 MG/DL (ref 6–20)
CALCIUM SERPL-MCNC: 8.9 MG/DL (ref 8.7–10.5)
CHLORIDE SERPL-SCNC: 106 MMOL/L (ref 95–110)
CO2 SERPL-SCNC: 23 MMOL/L (ref 23–29)
CREAT SERPL-MCNC: 0.6 MG/DL (ref 0.5–1.4)
DIFFERENTIAL METHOD: ABNORMAL
EOSINOPHIL # BLD AUTO: 0 K/UL (ref 0–0.5)
EOSINOPHIL NFR BLD: 0 % (ref 0–8)
ERYTHROCYTE [DISTWIDTH] IN BLOOD BY AUTOMATED COUNT: 11.9 % (ref 11.5–14.5)
EST. GFR  (NO RACE VARIABLE): >60 ML/MIN/1.73 M^2
GAMMA GLOB SERPL ELPH-MCNC: 1.12 G/DL (ref 0.67–1.58)
GLUCOSE SERPL-MCNC: 123 MG/DL (ref 70–110)
HCT VFR BLD AUTO: 40.6 % (ref 37–48.5)
HGB BLD-MCNC: 13.6 G/DL (ref 12–16)
IMM GRANULOCYTES # BLD AUTO: 0.09 K/UL (ref 0–0.04)
IMM GRANULOCYTES NFR BLD AUTO: 0.6 % (ref 0–0.5)
LYMPHOCYTES # BLD AUTO: 0.9 K/UL (ref 1–4.8)
LYMPHOCYTES NFR BLD: 6.1 % (ref 18–48)
MAGNESIUM SERPL-MCNC: 1.9 MG/DL (ref 1.6–2.6)
MCH RBC QN AUTO: 31.4 PG (ref 27–31)
MCHC RBC AUTO-ENTMCNC: 33.5 G/DL (ref 32–36)
MCV RBC AUTO: 94 FL (ref 82–98)
MONOCYTES # BLD AUTO: 0.1 K/UL (ref 0.3–1)
MONOCYTES NFR BLD: 0.7 % (ref 4–15)
NEUTROPHILS # BLD AUTO: 13.1 K/UL (ref 1.8–7.7)
NEUTROPHILS NFR BLD: 92.5 % (ref 38–73)
NRBC BLD-RTO: 0 /100 WBC
PLATELET # BLD AUTO: 279 K/UL (ref 150–450)
PMV BLD AUTO: 12.9 FL (ref 9.2–12.9)
POTASSIUM SERPL-SCNC: 4 MMOL/L (ref 3.5–5.1)
PROT SERPL-MCNC: 7.1 G/DL (ref 6–8.4)
PROT SERPL-MCNC: 7.3 G/DL (ref 6–8.4)
RBC # BLD AUTO: 4.33 M/UL (ref 4–5.4)
RPR SER QL: NORMAL
SODIUM SERPL-SCNC: 141 MMOL/L (ref 136–145)
WBC # BLD AUTO: 14.22 K/UL (ref 3.9–12.7)

## 2023-02-10 PROCEDURE — 97161 PT EVAL LOW COMPLEX 20 MIN: CPT

## 2023-02-10 PROCEDURE — 99285 EMERGENCY DEPT VISIT HI MDM: CPT

## 2023-02-10 PROCEDURE — 80053 COMPREHEN METABOLIC PANEL: CPT | Performed by: NURSE PRACTITIONER

## 2023-02-10 PROCEDURE — 93005 ELECTROCARDIOGRAM TRACING: CPT

## 2023-02-10 PROCEDURE — 36415 COLL VENOUS BLD VENIPUNCTURE: CPT | Performed by: NURSE PRACTITIONER

## 2023-02-10 PROCEDURE — 63600175 PHARM REV CODE 636 W HCPCS: Performed by: HOSPITALIST

## 2023-02-10 PROCEDURE — 83735 ASSAY OF MAGNESIUM: CPT | Performed by: NURSE PRACTITIONER

## 2023-02-10 PROCEDURE — 93010 EKG 12-LEAD: ICD-10-PCS | Mod: ,,, | Performed by: INTERNAL MEDICINE

## 2023-02-10 PROCEDURE — 99239 PR HOSPITAL DISCHARGE DAY,>30 MIN: ICD-10-PCS | Mod: ,,, | Performed by: HOSPITALIST

## 2023-02-10 PROCEDURE — 25000003 PHARM REV CODE 250: Performed by: NURSE PRACTITIONER

## 2023-02-10 PROCEDURE — 25000003 PHARM REV CODE 250: Performed by: HOSPITALIST

## 2023-02-10 PROCEDURE — 99239 HOSP IP/OBS DSCHRG MGMT >30: CPT | Mod: ,,, | Performed by: HOSPITALIST

## 2023-02-10 PROCEDURE — 25000003 PHARM REV CODE 250: Performed by: STUDENT IN AN ORGANIZED HEALTH CARE EDUCATION/TRAINING PROGRAM

## 2023-02-10 PROCEDURE — 63600175 PHARM REV CODE 636 W HCPCS: Performed by: STUDENT IN AN ORGANIZED HEALTH CARE EDUCATION/TRAINING PROGRAM

## 2023-02-10 PROCEDURE — 85025 COMPLETE CBC W/AUTO DIFF WBC: CPT | Performed by: NURSE PRACTITIONER

## 2023-02-10 PROCEDURE — 93010 ELECTROCARDIOGRAM REPORT: CPT | Mod: ,,, | Performed by: INTERNAL MEDICINE

## 2023-02-10 RX ORDER — METHYLPREDNISOLONE SODIUM SUCCINATE 1 G/16ML
1000 INJECTION INTRAMUSCULAR; INTRAVENOUS DAILY
Qty: 3 EACH | Refills: 0 | Status: SHIPPED | OUTPATIENT
Start: 2023-02-10 | End: 2023-02-13

## 2023-02-10 RX ORDER — PANTOPRAZOLE SODIUM 40 MG/1
40 TABLET, DELAYED RELEASE ORAL DAILY
Qty: 5 TABLET | Refills: 0 | Status: SHIPPED | OUTPATIENT
Start: 2023-02-10 | End: 2023-02-28

## 2023-02-10 RX ORDER — CHOLECALCIFEROL (VITAMIN D3) 50 MCG
2000 TABLET ORAL DAILY
Qty: 30 TABLET | Refills: 3 | Status: SHIPPED | OUTPATIENT
Start: 2023-02-10 | End: 2023-03-10 | Stop reason: DRUGHIGH

## 2023-02-10 RX ADMIN — PANTOPRAZOLE SODIUM 40 MG: 40 TABLET, DELAYED RELEASE ORAL at 07:02

## 2023-02-10 RX ADMIN — CEFTRIAXONE 1 G: 1 INJECTION, POWDER, FOR SOLUTION INTRAMUSCULAR; INTRAVENOUS at 07:02

## 2023-02-10 RX ADMIN — CHOLECALCIFEROL TAB 25 MCG (1000 UNIT) 2000 UNITS: 25 TAB at 07:02

## 2023-02-10 NOTE — NURSING
Pt arrived to unit from ER via wheelchair, family with her.  Pt is AAOx4, pt is independent, calm and cooperative.  Skin assessment performed by 2 nursing staff (Stephon, RN and Rivera RN).  VS stable, pt safely in bed, all needs addressed

## 2023-02-10 NOTE — ASSESSMENT & PLAN NOTE
2/9 UA + . UC pending. started on IV ceftriaxone.   2/10 UC with Multiple organisms isolated. None in predominance. ceftriaxone discontinued.

## 2023-02-10 NOTE — DISCHARGE SUMMARY
[facility] Medicine  Discharge Summary      Patient Name: Zeny Charles  MRN: [id number]  SETH: [id number]  Patient Class: IP- Inpatient  Admission Date: 2/8/2023  Hospital Length of Stay: 2 days  Discharge Date and Time:  02/10/2023 10:14 AM  Attending Physician: Francis Byrd MD   Discharging Provider: Francis Byrd MD  Primary Care Provider: Chris Rendon MD  [facility] Medicine Team: [facility] Francis Byrd MD  Primary Care Team: [facility]    HPI:   Zeny Charles is a 22 y.o. female with a PMHx of obesity, seizures, factor V Leiden mutation, ADHD, and demyelinating disease who presents to the ED for steroids for possible MS flare. The patient reports recurrent paresthesias since August after returning from [place]. She states the paresthesias were initially in her feet but are currently affecting her hands and lower back. She notes mild weakness in her hands due to decreased sensation, stating she has issues with gripping items at times. The patient denies any weakness in her legs and is able to ambulate independently but feels off balance at times. She was evaluated by neurology Dr. Marcela Rendon who obtained MRI brain and cervical spine that revealed active demyelinating disease. She planned to treat the patient with 1250 mg of prednisone for 5 days with GI prophylaxis as an outpatient. Also pending lumbar puncture. The patient and the mom are uncomfortable with this plan taking them any feels as an outpatient and came to the ER. Patient endorses anxiety.The patient denies myalgias, headache, fever, chills, SOB, cough, abdominal pain, N/V/D, or dysuria.     In the ED, pt mildly tachycardic but otherwise VSSAF. CBC and CMP unremarkable. The patient received 1,000mg IV solumedrol. Neurology was consulted in the ED.      * No surgery found *      Hospital Course:   2/9 UA + . UC pending. started on IV ceftriaxone. No need of inpatient lumbar puncture per neurology. started diet  . switch to oral steroids 1g daily x 5 days  neurology recs follow up with neuro immunology  2 weeks post discharge. PT/OT consulted started on cholecalciferaol for vitamin D deficiency   2/10 leucocytosis of 14. likely from steroids . UC with Multiple organisms isolated. None in predominance. ceftriaxone discontinued. discharge with oral steroids x 3 days        Goals of Care Treatment Preferences:  Code Status: Full Code      Consults:   Consults (From admission, onward)          Status Ordering Provider     Inpatient consult to Interventional Radiology  Once        Provider:  (Not yet assigned)    Completed BRADLEY RIVERA          Assessment/Plan:      * Demyelinating disease  Paresthesia  -Labs unremarkable.  -Reviewed outpatient MRI brain and cerv spine demyelinating w/wo contrast.  -Continue solumedrol 1g IV q 24hrs x5 doses.  -Continue PPI daily for GI prophylaxis.   -NPO after midnight for possible LP.  -Neurology consulted, appreciate recs.  2/9No need of inpatient umbar puncture per neurology. started diet.  switch to oral steroids 1g daily x 5 days  neurology recs follow up with neuro immunology  2 weeks post discharge     Leucocytosis           Patient with leucocytosis Recent Labs   Lab 02/08/23  1805 02/09/23  0317 02/10/23  0500   WBC 10.68 8.24 14.22*     . Afebrile.   urine culture pending . likely secondary to steroids.     Vitamin D deficiency  12. started on cholecalciferaol for vitamin D deficiency         UTI (urinary tract infection)  2/9 UA + . UC pending. started on IV ceftriaxone.   2/10 UC with Multiple organisms isolated. None in predominance. ceftriaxone discontinued.       Paresthesia     as above     ADHD (attention deficit hyperactivity disorder), inattentive type  -On adderall outpatient, resume at discharge.     Class 2 obesity due to excess calories without serious comorbidity with body mass index (BMI) of 37.0 to 37.9 in adult  Body mass index is 34.75 kg/m². Obesity  complicates all aspects of disease management from diagnostic modalities to treatment.      Seizure disorder  -History of seizure as a child, no recurrence and not on AEDs.         Final Active Diagnoses:    Diagnosis Date Noted POA    PRINCIPAL PROBLEM:  Demyelinating disease [G37.9] 02/08/2023 Yes    Leucocytosis [D72.829] 02/10/2023 Yes    UTI (urinary tract infection) [N39.0] 02/09/2023 Yes    Vitamin D deficiency [E55.9] 02/09/2023 Yes    Paresthesia [R20.2] 02/08/2023 Yes    ADHD (attention deficit hyperactivity disorder), inattentive type [F90.0] 05/25/2022 Yes    Class 2 obesity due to excess calories without serious comorbidity with body mass index (BMI) of 37.0 to 37.9 in adult [E66.09, Z68.37] 05/12/2022 Not Applicable    Seizure disorder [G40.909] 10/25/2016 Yes     Chronic      Problems Resolved During this Admission:       Medications:  Reconciled Home Medications:      Medication List        Start taking these medications      methylPREDNISolone sodium succinate 1,000 mg injection  Commonly known as: SOLU-MEDROL  Mix 1 syringe (1,000 mg) with smoothie and drink once daily. for 3 days  Replaces: predniSONE 50 MG Tab     VITAMIN D3 50 mcg (2,000 unit) Tab  Generic drug: cholecalciferol (vitamin D3)  Take 1 tablet (2,000 Units total) by mouth once daily.            Continue taking these medications      acetaminophen 500 MG tablet  Commonly known as: TYLENOL  Take 500 mg by mouth every 8 (eight) hours as needed for Pain.     MOUNJARO 2.5 mg/0.5 mL Pnij  Generic drug: tirzepatide  Inject 2.5 mg into the skin every 7 days.     pantoprazole 40 MG tablet  Commonly known as: PROTONIX  Take 1 tablet (40 mg total) by mouth once daily for 5 days            Stop taking these medications      ibuprofen 200 MG tablet  Commonly known as: ADVIL,MOTRIN     predniSONE 50 MG Tab  Commonly known as: DELTASONE  Replaced by: methylPREDNISolone sodium succinate 1,000 mg injection     pseudoephedrine 30 MG tablet  Commonly  known as: SUDAFED     pulse oximeter device  Commonly known as: pulse oximeter                Discharged Condition: fair    Disposition: Home or Self Care       Follow Up:     Patient Instructions:   No discharge procedures on file.    Laboratory/Diagnostic Data:    CBC/Anemia Labs: Coags:    Recent Labs   Lab 02/08/23  1805 02/09/23  0317 02/10/23  0500   WBC 10.68 8.24 14.22*   HGB 15.4 14.0 13.6   HCT 46.6 42.4 40.6    272 279   MCV 94 93 94   RDW 11.9 11.6 11.9    No results for input(s): PT, INR, APTT in the last 168 hours.     Chemistries: ABG:   Recent Labs   Lab 02/08/23  1805 02/09/23  0317 02/10/23  0459    139 141   K 3.8 3.8 4.0    106 106   CO2 23 19* 23   BUN 8 8 9   CREATININE 0.8 0.7 0.6   CALCIUM 9.7 9.4 8.9   PROT 8.5* 7.6 7.1   BILITOT 0.3 0.3 0.2   ALKPHOS 66 59 56   ALT 22 23 26   AST 16 15 15   MG  --  1.8 1.9    No results for input(s): PH, PCO2, PO2, HCO3, POCSATURATED, BE in the last 168 hours.     POCT Glucose: HbA1c:    No results for input(s): POCTGLUCOSE in the last 168 hours. Hemoglobin A1C   Date Value Ref Range Status   09/22/2022 4.8 4.0 - 5.6 % Final     Comment:     ADA Screening Guidelines:  5.7-6.4%  Consistent with prediabetes  >or=6.5%  Consistent with diabetes    High levels of fetal hemoglobin interfere with the HbA1C  assay. Heterozygous hemoglobin variants (HbS, HgC, etc)do  not significantly interfere with this assay.   However, presence of multiple variants may affect accuracy.     03/17/2021 4.7 4.0 - 5.6 % Final     Comment:     ADA Screening Guidelines:  5.7-6.4%  Consistent with prediabetes  >or=6.5%  Consistent with diabetes    High levels of fetal hemoglobin interfere with the HbA1C  assay. Heterozygous hemoglobin variants (HbS, HgC, etc)do  not significantly interfere with this assay.   However, presence of multiple variants may affect accuracy.     04/18/2017 5.0 4.5 - 6.2 % Final     Comment:     According to ADA guidelines, hemoglobin A1C  <7.0% represents  optimal control in non-pregnant diabetic patients.  Different  metrics may apply to specific populations.   Standards of Medical Care in Diabetes - 2016.  For the purpose of screening for the presence of diabetes:  <5.7%     Consistent with the absence of diabetes  5.7-6.4%  Consistent with increasing risk for diabetes   (prediabetes)  >or=6.5%  Consistent with diabetes  Currently no consensus exists for use of hemoglobin A1C  for diagnosis of diabetes for children.          Cardiac Enzymes: Ejection Fractions:    No results for input(s): CPK, CPKMB, MB, TROPONINI in the last 72 hours. No results found for: EF       Recent Labs   Lab 02/08/23 1908   COLORU Yellow   APPEARANCEUA Hazy*   PHUR 6.0   SPECGRAV 1.025   PROTEINUA Negative   GLUCUA Negative   KETONESU Negative   BILIRUBINUA Negative   OCCULTUA Negative   NITRITE Negative   LEUKOCYTESUR 2+*   RBCUA 3   WBCUA 99*   BACTERIA Few*   SQUAMEPITHEL 29       No results found for: PROCAL, LACTATE  No results found for: BNP  CRP (mg/L)   Date Value   01/24/2023 8.6 (H)     Sed Rate (mm/Hr)   Date Value   02/03/2023 35 (H)   01/24/2023 32     No results found for: DDIMER  No results found for: FERRITIN  No results found for: LDH  CPK (U/L)   Date Value   01/24/2023 46     Results for orders placed or performed in visit on 02/03/23   Vitamin D   Result Value Ref Range    Vit D, 25-Hydroxy 19 (L) 30 - 96 ng/mL     No results found for: [id number]    Microbiology labs for the last week  Microbiology Results (last 7 days)       Procedure Component Value Units Date/Time    Urine culture [[id number]] Collected: 02/08/23 1908    Order Status: Completed Specimen: Urine Updated: 02/10/23 0744     Urine Culture, Routine Multiple organisms isolated. None in predominance.  Repeat if      clinically necessary.    Narrative:      Specimen Source->Urine              Reviewed and noted in plan where applicable- Please see chart for full lab  data.    Lines/Drains:       Peripheral IV - Single Lumen 02/08/23 1909 22 G Anterior;Right Hand (Active)   Site Assessment Painful;Red/Inflamed 02/10/23 0800   Extremity Assessment Distal to IV No abnormal discoloration 02/09/23 2134   Line Status Flushed;Saline locked 02/08/23 1935   Dressing Status Clean;Dry;Intact 02/09/23 2134   Dressing Intervention Integrity maintained 02/08/23 1935   Dressing Change Due 02/12/23 02/08/23 1935   Site Change Due 02/12/23 02/08/23 1935   Reason Not Rotated Not due 02/08/23 1935   Number of days: 1       Imaging      No results found for this or any previous visit.      MRI Cervical Spine Demyelinating W W/O Contrast  Narrative: EXAMINATION:  MRI CERVICAL SPINE DEMYELINATING W W/O CONTRAST    CLINICAL HISTORY:  Demyelinating disease of central nervous system, unspecified    TECHNIQUE:  Pre and postcontrast MRI of the cervical spine prior to and following administration of 9 cc Gadavist.    COMPARISON:  Similar study 02/01/2023    FINDINGS:  Skull base and craniocervical junction not included in the field of view.    Normal vertebral body heights, marrow signal and intervertebral discs.  Normal posterior elements.    Redemonstration and unchanged appearance of spinal cord expansion and increased T2 signal posterior to C3-4 and patchy increased T2 signal with minimal cord expansion posterior to the C6-7.  After contrast administration, or there may be faint areas of contrast enhancement along the periphery of the spinal cord and within the central canal at C3 and C4 and C6-7 respectively suggesting possible active demyelination.    No prevertebral soft tissue abnormalities.  Visualized portions of the upper thoracic spinal cord are unremarkable.  Impression: Findings in keeping with patient's known demyelinating disease with faint areas of enhancement in the spinal cord that may reflect active demyelination.    Electronically signed by: Arnav Valencia    Date:    02/08/2023  Time:    10:29  MRI Brain Demyelinating W W/O Contrast  Narrative: EXAMINATION:  MRI BRAIN DEMYELINATING W/ WO CONTRAST    CLINICAL HISTORY:  Demyelinating disease of central nervous system, unspecified    TECHNIQUE:  Multiplanar multisequence MR imaging of the brain was performed before and after the administration of 10 mL Gadavist intravenous contrast.    COMPARISON:  MRI 11/02/2016    FINDINGS:  Intracranial Compartment:    Within the cerebral white matter there are scattered areas punctate T2 hyperintense signal few of which are in a pericallosal distribution, prominent in the left temporal region.  No lesions are present in the brainstem or cerebellum.  Overall plaque burden is mild to moderateworse compared with the prior study.    After contrast administration there is patchy enhancement of the white matter of the medial left temporal lobe and punctate foci of enhancement of at least 3 white matter lesions in the right cerebral hemisphere and faint enhancement of some lesions in the left cerebral hemisphere compatible with areas of active demyelination.    There is overall no significant white matter volume loss.    Skull/Extracranial Contents (limited evaluation): Bone marrow signal intensity is normal.  Impression: Active demyelinating disease as described progressed since the prior exam.    Electronically signed by: Arnav Valencia Jr  Date:    02/08/2023  Time:    10:10      Imaging:  Imaging Results    None           Follow Up Instructions:     Future Appointments   Date Time Provider Department Center   2/14/2023  3:00 PM Tamica Meek, PT [facility]   2/22/2023  3:00 PM Maya Noble, PT [facility]   2/24/2023 10:20 AM Chris Rendon MD [facility]   2/28/2023  3:00 PM Zahra Schmid PTA [facility]   3/7/2023  3:00 PM Maya Noble, PT [facility]       Discharge Instructions:  Discharge Procedure  Orders   Ambulatory referral/consult to Neurology   Standing Status: Future   Referral Priority: Routine Referral Type: Consultation   Referral Reason: Specialty Services Required   Requested Specialty: Neurology   Number of Visits Requested: 1         Total time spent on discharge 40   minutes     Francis Byrd MD  Attending Staff Physician  [facility]

## 2023-02-10 NOTE — PLAN OF CARE
Patient is ready for discharge. Patient stable alert and oriented. IVs removed. No complaints of pain. Discussed discharge plan. Reviewed medications and side effects, appointments, and answered questions with patient and family.   Problem: Adult Inpatient Plan of Care  Goal: Plan of Care Review  Outcome: Met  Goal: Patient-Specific Goal (Individualized)  Outcome: Met  Goal: Absence of Hospital-Acquired Illness or Injury  Outcome: Met  Goal: Optimal Comfort and Wellbeing  Outcome: Met  Goal: Readiness for Transition of Care  Outcome: Met     Problem: Skin Injury Risk Increased  Goal: Skin Health and Integrity  Outcome: Met

## 2023-02-10 NOTE — ASSESSMENT & PLAN NOTE
Patient with leucocytosis Recent Labs   Lab 02/08/23  1805 02/09/23  0317 02/10/23  0500   WBC 10.68 8.24 14.22*     . Afebrile.   urine culture pending . likely secondary to steroids.

## 2023-02-10 NOTE — TELEPHONE ENCOUNTER
Attempted to call patient to schedule IR procedure. Unable to reach patient, a voicemail was left with our contact information ([phone]).

## 2023-02-10 NOTE — LETTER
Patient: Zeny Charles  YOB: 2000  Date: 2/11/2023 Time: 3:55 AM  Location: [facility]    Leaving the Hospital Against Medical Advice    Chart #:[id number]    This will certify that I, the undersigned,    ______________________________________________________________________    A patient in the above named medical center, having requested discharge and removal from the medical center against the advice of my attending physician(s), hereby release [facility], its physicians, officers and employees, severally and individually, from any and all liability of any nature whatsoever for any injury or harm or complication of any kind that may result directly or indirectly, by reason of my terminating my stay as a patient at [facility] and my departure from [facility], and hereby waive any and all rights of action I may now have or later acquire as a result of my voluntary departure from [facility] and the termination of my stay as a patient therein.    This release is made with the full knowledge of the danger that may result from the action which I am taking.      Date:_______________________                         ___________________________                                                                                    Patient/Legal Representative    Witness:        ____________________________                          ___________________________  Nurse                                                                        Physician

## 2023-02-10 NOTE — PLAN OF CARE
Re:  Zeny Charles, WORK / SCHOOL EXCUSE    Date: 02/10/2023            Hospital Medicine Dept.  [facility] [address] [place] [address]  [phone] after hours  [phone] fax To whom it may concern:    Ms. Zeny Charles has been hospitalized at the [facility] since 2/8/2023.  Please excuse the patient from duties.  Patient may return on 2/13/23  No restrictions.     Please contact me if you have any questions.            Francis Byrd MD

## 2023-02-10 NOTE — NURSING
Nurses Note -- 4 Eyes      2/9/2023   10:59 PM      Skin assessed during: Admit      [x] No Pressure Injuries Present    []Prevention Measures Documented      [] Yes- Altered Skin Integrity Present or Discovered   [] LDA Added if Not in Epic (Describe Wound)   [] New Altered Skin Integrity was Present on Admit and Documented in LDA   [] Wound Image Taken    Wound Care Consulted? No    Attending Nurse:  Lissa Szymanski RN     Second RN/Staff Member:  Rivera

## 2023-02-10 NOTE — PROGRESS NOTES
[facility] - Emergency Dept  [facility] Medicine  Progress Note    Patient Name: Zeny Charles  MRN: [id number]  Patient Class: IP- Inpatient   Admission Date: 2/8/2023  Length of Stay: 2 days  Attending Physician: Francis Byrd MD  Primary Care Provider: Chris Rendon MD        Subjective:     Principal Problem:Demyelinating disease        HPI:  Zeny Charles is a 22 y.o. female with a PMHx of obesity, seizures, factor V Leiden mutation, ADHD, and demyelinating disease who presents to the ED for steroids for possible MS flare. The patient reports recurrent paresthesias since August after returning from [place]. She states the paresthesias were initially in her feet but are currently affecting her hands and lower back. She notes mild weakness in her hands due to decreased sensation, stating she has issues with gripping items at times. The patient denies any weakness in her legs and is able to ambulate independently but feels off balance at times. She was evaluated by neurology Dr. Marcela Rendon who obtained MRI brain and cervical spine that revealed active demyelinating disease. She planned to treat the patient with 1250 mg of prednisone for 5 days with GI prophylaxis as an outpatient. Also pending lumbar puncture. The patient and the mom are uncomfortable with this plan taking them any feels as an outpatient and came to the ER. Patient endorses anxiety.The patient denies myalgias, headache, fever, chills, SOB, cough, abdominal pain, N/V/D, or dysuria.     In the ED, pt mildly tachycardic but otherwise VSSAF. CBC and CMP unremarkable. The patient received 1,000mg IV solumedrol. Neurology was consulted in the ED.      Overview/Hospital Course:  2/9 UA + . UC pending. started on IV ceftriaxone. No need of inpatient lumbar puncture per neurology. started diet . switch to oral steroids 1g daily x 5 days  neurology recs follow up with neuro immunology  2 weeks post discharge. PT/OT consulted started on cholecalciferaol for  vitamin D deficiency   2/10 leucocytosis of 14. likely from steroids . UC with Multiple organisms isolated. None in predominance. ceftriaxone discontinued. discharge with oral steroids x 3 days         Review of Systems:   Pain scale:   Constitutional:  fever,  chills, headache, vision loss, hearing loss, weight loss, Generalized weakness, falls, loss of smell, loss of taste, poor appetite,  sore throat  Respiratory: cough, shortness of breath.   Cardiovascular: chest pain, dizziness, palpitations, orthopnea, swelling of feet, syncope  Gastrointestinal: nausea, vomiting, abdominal pain, diarrhea, black stool,  blood in stool, change in bowel habits  Genitourinary: hematuria, dysuria, urgency, frequency  Integument/Breast: rash,  pruritis  Hematologic/Lymphatic: easy bruising, lymphadenopathy  Musculoskeletal: arthralgias , myalgias, back pain, neck pain, knee pain, gait problem   Neurological: confusion, seizures, tremors, slurred speech,  numbness - paresthesias to bilateral hands  Behavioral/Psych:  depression, anxiety, auditory or visual hallucinations     OBJECTIVE:     Physical Exam:  Body mass index is 34.75 kg/m².    Constitutional: Appears well-developed and well-nourished. obesity   Head: Normocephalic and atraumatic.   Neck: Normal range of motion. Neck supple.   Cardiovascular: Normal heart rate.  Regular heart rhythm.  Pulmonary/Chest: Effort normal.   Abdominal: No distension.  No tenderness  Musculoskeletal: Normal range of motion. No edema.   Neurological: Alert and oriented to person, place, and time.   Skin: Skin is warm and dry.   Psychiatric: Normal mood and affect. Behavior is normal.                  Vital Signs  Temp: 97.3 °F (36.3 °C) (02/10/23 0735)  Pulse: 95 (02/10/23 0735)  Resp: 18 (02/10/23 0735)  BP: (Abnormal) 99/55 (02/10/23 0735)  SpO2: 97 % (02/10/23 0735)     24 Hour VS Range    Temp:  [97.3 °F (36.3 °C)-98.8 °F (37.1 °C)]   Pulse:  []   Resp:  [16-19]   BP: ()/(55-77)    SpO2:  [94 %-98 %]   No intake or output data in the 24 hours ending 02/10/23 1013        I/O This Shift:  No intake/output data recorded.    Wt Readings from Last 3 Encounters:   02/09/23 86.2 kg (190 lb)   01/24/23 86.2 kg (190 lb)   01/20/23 86.3 kg (190 lb 4.1 oz)       I have personally reviewed the vitals and recorded Intake/Output     Laboratory/Diagnostic Data:    CBC/Anemia Labs: Coags:    Recent Labs   Lab 02/03/23  1303 02/08/23  1805 02/09/23  0317 02/10/23  0500   WBC  --  10.68 8.24 14.22*   HGB  --  15.4 14.0 13.6   HCT  --  46.6 42.4 40.6   PLT  --  311 272 279   MCV  --  94 93 94   RDW  --  11.9 11.6 11.9   [id number]  --   --   --     No results for input(s): PT, INR, APTT in the last 168 hours.     Chemistries: ABG:   Recent Labs   Lab 02/08/23  1805 02/09/23  0317 02/10/23  0459    139 141   K 3.8 3.8 4.0    106 106   CO2 23 19* 23   BUN 8 8 9   CREATININE 0.8 0.7 0.6   CALCIUM 9.7 9.4 8.9   PROT 8.5* 7.6 7.1   BILITOT 0.3 0.3 0.2   ALKPHOS 66 59 56   ALT 22 23 26   AST 16 15 15   MG  --  1.8 1.9    No results for input(s): PH, PCO2, PO2, HCO3, POCSATURATED, BE in the last 168 hours.     POCT Glucose: HbA1c:    No results for input(s): POCTGLUCOSE in the last 168 hours. Hemoglobin A1C   Date Value Ref Range Status   09/22/2022 4.8 4.0 - 5.6 % Final     Comment:     ADA Screening Guidelines:  5.7-6.4%  Consistent with prediabetes  >or=6.5%  Consistent with diabetes    High levels of fetal hemoglobin interfere with the HbA1C  assay. Heterozygous hemoglobin variants (HbS, HgC, etc)do  not significantly interfere with this assay.   However, presence of multiple variants may affect accuracy.     03/17/2021 4.7 4.0 - 5.6 % Final     Comment:     ADA Screening Guidelines:  5.7-6.4%  Consistent with prediabetes  >or=6.5%  Consistent with diabetes    High levels of fetal hemoglobin interfere with the HbA1C  assay. Heterozygous hemoglobin variants (HbS, HgC, etc)do  not significantly  interfere with this assay.   However, presence of multiple variants may affect accuracy.     04/18/2017 5.0 4.5 - 6.2 % Final     Comment:     According to ADA guidelines, hemoglobin A1C <7.0% represents  optimal control in non-pregnant diabetic patients.  Different  metrics may apply to specific populations.   Standards of Medical Care in Diabetes - 2016.  For the purpose of screening for the presence of diabetes:  <5.7%     Consistent with the absence of diabetes  5.7-6.4%  Consistent with increasing risk for diabetes   (prediabetes)  >or=6.5%  Consistent with diabetes  Currently no consensus exists for use of hemoglobin A1C  for diagnosis of diabetes for children.          Cardiac Enzymes: Ejection Fractions:    No results for input(s): CPK, CPKMB, MB, TROPONINI in the last 72 hours. No results found for: EF       Recent Labs   Lab 02/08/23 1908   COLORU Yellow   APPEARANCEUA Hazy*   PHUR 6.0   SPECGRAV 1.025   PROTEINUA Negative   GLUCUA Negative   KETONESU Negative   BILIRUBINUA Negative   OCCULTUA Negative   NITRITE Negative   LEUKOCYTESUR 2+*   RBCUA 3   WBCUA 99*   BACTERIA Few*   SQUAMEPITHEL 29       No results found for: PROCAL, LACTATE  No results found for: BNP  CRP (mg/L)   Date Value   01/24/2023 8.6 (H)     Sed Rate (mm/Hr)   Date Value   02/03/2023 35 (H)   01/24/2023 32     No results found for: DDIMER  No results found for: FERRITIN  No results found for: LDH  CPK (U/L)   Date Value   01/24/2023 46     Results for orders placed or performed in visit on 02/03/23   Vitamin D   Result Value Ref Range    Vit D, 25-Hydroxy 19 (L) 30 - 96 ng/mL     No results found for: [id number]    Microbiology labs for the last week  Microbiology Results (last 7 days)       Procedure Component Value Units Date/Time    Urine culture [[id number]] Collected: 02/08/23 1908    Order Status: Completed Specimen: Urine Updated: 02/10/23 0744     Urine Culture, Routine Multiple organisms isolated. None in predominance.   Repeat if      clinically necessary.    Narrative:      Specimen Source->Urine            Reviewed and noted in plan where applicable- Please see chart for full lab data.    Lines/Drains:       Peripheral IV - Single Lumen 02/08/23 1909 22 G Anterior;Right Hand (Active)   Site Assessment Clean;Dry;Intact;No redness;No swelling 02/08/23 1935   Extremity Assessment Distal to IV No abnormal discoloration;No redness;No swelling;No warmth 02/08/23 1935   Line Status Flushed;Saline locked 02/08/23 1935   Dressing Status Clean;Dry;Intact 02/08/23 1935   Dressing Intervention Integrity maintained 02/08/23 1935   Dressing Change Due 02/12/23 02/08/23 1935   Site Change Due 02/12/23 02/08/23 1935   Reason Not Rotated Not due 02/08/23 1935   Number of days: 0       Imaging      No results found for this or any previous visit.      MRI Cervical Spine Demyelinating W W/O Contrast  Narrative: EXAMINATION:  MRI CERVICAL SPINE DEMYELINATING W W/O CONTRAST    CLINICAL HISTORY:  Demyelinating disease of central nervous system, unspecified    TECHNIQUE:  Pre and postcontrast MRI of the cervical spine prior to and following administration of 9 cc Gadavist.    COMPARISON:  Similar study 02/01/2023    FINDINGS:  Skull base and craniocervical junction not included in the field of view.    Normal vertebral body heights, marrow signal and intervertebral discs.  Normal posterior elements.    Redemonstration and unchanged appearance of spinal cord expansion and increased T2 signal posterior to C3-4 and patchy increased T2 signal with minimal cord expansion posterior to the C6-7.  After contrast administration, or there may be faint areas of contrast enhancement along the periphery of the spinal cord and within the central canal at C3 and C4 and C6-7 respectively suggesting possible active demyelination.    No prevertebral soft tissue abnormalities.  Visualized portions of the upper thoracic spinal cord are unremarkable.  Impression: Findings  in keeping with patient's known demyelinating disease with faint areas of enhancement in the spinal cord that may reflect active demyelination.    Electronically signed by: Arnav Valencia Jr  Date:    02/08/2023  Time:    10:29  MRI Brain Demyelinating W W/O Contrast  Narrative: EXAMINATION:  MRI BRAIN DEMYELINATING W/ WO CONTRAST    CLINICAL HISTORY:  Demyelinating disease of central nervous system, unspecified    TECHNIQUE:  Multiplanar multisequence MR imaging of the brain was performed before and after the administration of 10 mL Gadavist intravenous contrast.    COMPARISON:  MRI 11/02/2016    FINDINGS:  Intracranial Compartment:    Within the cerebral white matter there are scattered areas punctate T2 hyperintense signal few of which are in a pericallosal distribution, prominent in the left temporal region.  No lesions are present in the brainstem or cerebellum.  Overall plaque burden is mild to moderateworse compared with the prior study.    After contrast administration there is patchy enhancement of the white matter of the medial left temporal lobe and punctate foci of enhancement of at least 3 white matter lesions in the right cerebral hemisphere and faint enhancement of some lesions in the left cerebral hemisphere compatible with areas of active demyelination.    There is overall no significant white matter volume loss.    Skull/Extracranial Contents (limited evaluation): Bone marrow signal intensity is normal.  Impression: Active demyelinating disease as described progressed since the prior exam.    Electronically signed by: Arnav Valencia Jr  Date:    02/08/2023  Time:    10:10      Labs, Imaging, EKG and Diagnostic results from 2/10/2023 were reviewed.    Medications:  Medication list was reviewed and changes noted under Assessment/Plan.  No current facility-administered medications on file prior to encounter.     Current Outpatient Medications on File Prior to Encounter   Medication Sig Dispense  Refill    acetaminophen (TYLENOL) 500 MG tablet Take 500 mg by mouth every 8 (eight) hours as needed for Pain.      [DISCONTINUED] ibuprofen (ADVIL,MOTRIN) 200 MG tablet Take 200 mg by mouth every 8 (eight) hours as needed for Pain.      [DISCONTINUED] pseudoephedrine (SUDAFED) 30 MG tablet Take 30 mg by mouth daily as needed for Congestion.      MOUNJARO 2.5 mg/0.5 mL PnIj Inject 2.5 mg into the skin every 7 days.      [DISCONTINUED] pulse oximeter (PULSE OXIMETER) device by Apply Externally route 2 (two) times a day. Use twice daily at 8 AM and 3 PM and [facility] the value [facility] as directed. 1 each 0     Scheduled Medications:  methylPREDNISolone sodium succinate injection, 1,000 mg, Intravenous, Q24H  pantoprazole, 40 mg, Oral, Daily  vitamin D, 2,000 Units, Oral, Daily      PRN: acetaminophen, dextrose 10%, dextrose 10%, glucagon (human recombinant), glucose, glucose, hydrOXYzine HCL, methocarbamoL, ondansetron, sodium chloride 0.9%  Infusions:   Estimated Creatinine Clearance: 152.3 mL/min (based on SCr of 0.6 mg/dL).    Assessment/Plan:      * Demyelinating disease  Paresthesia  -Labs unremarkable.  -Reviewed outpatient MRI brain and cerv spine demyelinating w/wo contrast.  -Continue solumedrol 1g IV q 24hrs x5 doses.  -Continue PPI daily for GI prophylaxis.   -NPO after midnight for possible LP.  -Neurology consulted, appreciate recs.  2/9No need of inpatient umbar puncture per neurology. started diet.  switch to oral steroids 1g daily x 5 days  neurology recs follow up with neuro immunology  2 weeks post discharge    Leucocytosis    Patient with leucocytosis Recent Labs   Lab 02/08/23  1805 02/09/23  0317 02/10/23  0500   WBC 10.68 8.24 14.22*     . Afebrile.   urine culture pending . likely secondary to steroids.    Vitamin D deficiency  12. started on cholecalciferaol for vitamin D deficiency       UTI (urinary tract infection)  2/9 UA + . UC pending. started on IV ceftriaxone.   2/10 UC with Multiple  organisms isolated. None in predominance. ceftriaxone discontinued.      Paresthesia    as above    ADHD (attention deficit hyperactivity disorder), inattentive type  -On adderall outpatient, resume at discharge.    Class 2 obesity due to excess calories without serious comorbidity with body mass index (BMI) of 37.0 to 37.9 in adult  Body mass index is 34.75 kg/m². Obesity complicates all aspects of disease management from diagnostic modalities to treatment.     Seizure disorder  -History of seizure as a child, no recurrence and not on AEDs.      VTE Risk Mitigation (From admission, onward)           Ordered     Reason for No Pharmacological VTE Prophylaxis  Once        Question:  Reasons:  Answer:  Risk of Bleeding    02/08/23 1902     IP VTE HIGH RISK PATIENT  Once         02/08/23 1902     Place sequential compression device  Until discontinued         02/08/23 1902                    Discharge Planning   BEE: 2/10/2023     Code Status: Full Code   Is the patient medically ready for discharge?: No    Reason for patient still in hospital (select all that apply): Treatment                     Francis Byrd MD  Department of Hospital Medicine   [facility] - Emergency Dept

## 2023-02-10 NOTE — PT/OT/SLP EVAL
"Physical Therapy Evaluation and Discharge Note    Patient Name:  Zeny Charles   MRN:  [id number]  Admitting Diagnosis:  Demyelinating disease   Recent Surgery: * No surgery found *    Admit Date: 2/8/2023  Length of Stay: 2 days    Recommendations:     Discharge Recommendations:  outpatient OT   Discharge Equipment Recommendations: none   Barriers to discharge: None    Assessment:     Zeny Charles is a 22 y.o. female admitted with a medical diagnosis of Demyelinating disease. .  At this time, patient is functioning at their prior level of function and does not require further acute PT services.     Highest level of mobility achieved this visit: ambulates 250ft (I)    Treatment Tolerated: Excellent    Plan:     During this hospitalization, patient does not require further acute PT services.  Please re-consult if situation changes.      Subjective     RN Jace notified prior to session. Pt's mother present upon PT entrance into room.    Chief Complaint: numbness in hands  Patient/Family Comments/goals: "My legs have gotten better but my hands have gotten worse."  Pain/Comfort:  Pain Rating 1: 0/10 (numbness in hands)    Social History:  Residence: lives with their family 1-story house with 0 [address].  Support available: family  Equipment Used: none  Equipment Owned (not using): None  Prior level of function: independent  Work: Student.   Drive: yes.     Objective:     Additional staff present: OT Kermit shadowing    Patient found ambulatory in room/albert with  (no active lines) upon PT entry to room.    General Precautions: Standard,     Orthopedic Precautions:N/A   Braces: N/A   Body mass index is 34.75 kg/m².  Oxygen Device: Room Air  Vitals: /63 (BP Location: Left arm, Patient Position: Lying)   Pulse 105   Temp 98.2 °F (36.8 °C) (Oral)   Resp 18   Ht 5' 2" (1.575 m)   Wt 86.2 kg (190 lb)   LMP 01/28/2023 (Approximate)   SpO2 (!) 94%   Breastfeeding No   BMI 34.75 kg/m²     Exam:  Cognition:   Alert and " Cooperative  Command following: Follows multistep verbal commands  Fluency: clear/fluent  Skin Integrity: Visible skin intact  Edema: None noted  Sensation: Intact to light touch (subjective numbness in fingers)  Hearing: Intact  Vision:  Intact  Coordination: grossly WFL  Range of Motion:  RLE: WFL  LLE: WFL  Strength Exam:  Bilateral Lower Extremity Strength: grossly WNL    Outcome Measures:  AM-PAC 6 CLICK MOBILITY  Turning over in bed (including adjusting bedclothes, sheets and blankets)?: 4  Sitting down on and standing up from a chair with arms (e.g., wheelchair, bedside commode, etc.): 4  Moving from lying on back to sitting on the side of the bed?: 4  Moving to and from a bed to a chair (including a wheelchair)?: 4  Need to walk in hospital room?: 4  Climbing 3-5 steps with a railing?: 4  Basic Mobility Total Score: 24     Functional Mobility:  Bed Mobility:   Sit to Supine: independence; to L side of bed    Sitting Balance at Edge of Bed:  Assistance Level Required: [place]  Time: 3 min  Postural deviations noted: no deviations noted    Transfers:   Sit <> Stand Transfer: independence with no assistive device   Stand <> Sit Transfer: independence with no assistive device   [id number] from EOB    Standing Balance:  Assistance Level Required: [place]  Patient used: no assistive device   Time: 5 min  Postural deviations noted: no deviations noted    Gait:   Patient ambulated: 250ft   Patient required: independent  Patient used:  no assistive device   Gait Pattern observed: swing through  Gait Deviation(s): steady gait    Education:  Time provided for education, counseling and discussion of health disposition in regards to patient's current status  All questions answered within PT scope of practice and to patient's satisfaction    Patient left HOB elevated with call button in reach and mother present.    History:     Past Medical History:   Diagnosis Date    Factor 5 Leiden mutation, heterozygous  9/13/2016    Seizures     1 at the age of 10       Past Surgical History:   Procedure Laterality Date    TONSILLECTOMY  6 years old       Time Tracking:     PT Received On: 02/10/23  PT Start Time: 0936     PT Stop Time: 0947  PT Total Time (min): 11 min     Billable Minutes: Evaluation 1 procedure    Jose Figueroa PT, DPT  2/10/2023

## 2023-02-10 NOTE — PLAN OF CARE
[facility] of [facility]  Discharge Final Note    Primary Care Provider: Chris Rendon MD    Expected Discharge Date: 2/10/2023    Future Appointments   Date Time Provider Department Center   2/14/2023  3:00 PM Tamica Meek, PT [facility]   2/22/2023  3:00 PM Maya Noble, PT [facility] - [facility]   2/24/2023 10:20 AM Chris Rendon MD [facility]   2/28/2023  3:00 PM Zahra Schmid, PTA [facility] - B   3/7/2023  3:00 PM Maya Noble, PT [facility] - B        Final Discharge Note (most recent)       Final Note - 02/10/23 1051          Final Note    Assessment Type Final Discharge Note     Anticipated Discharge Disposition Home or Self Care     What phone number can be called within the next 1-3 days to see how you are doing after discharge? [phone]     Hospital Resources/Appts/Education Provided Appointments scheduled and added to AVS        Post-Acute Status    Post-Acute Authorization Other     Other Status No Post-Acute Service Needs     Discharge Delays None known at this time                     Important Message from Medicare             Contact Info       Chris Rendon MD   Specialty: Family Medicine   Relationship: PCP - General    [address] [place], [place] [address] [id number]   Phone: [phone]       Next Steps: Follow up on 2/24/2023    Instructions: Pt has a 1040am appt

## 2023-02-11 VITALS
HEART RATE: 96 BPM | OXYGEN SATURATION: 96 % | BODY MASS INDEX: 34.89 KG/M2 | TEMPERATURE: 98 F | DIASTOLIC BLOOD PRESSURE: 85 MMHG | RESPIRATION RATE: 19 BRPM | WEIGHT: 189.63 LBS | SYSTOLIC BLOOD PRESSURE: 131 MMHG | HEIGHT: 62 IN

## 2023-02-11 LAB
ALBUMIN SERPL BCP-MCNC: 3.7 G/DL (ref 3.5–5.2)
ALP SERPL-CCNC: 51 U/L (ref 55–135)
ALT SERPL W/O P-5'-P-CCNC: 23 U/L (ref 10–44)
ANION GAP SERPL CALC-SCNC: 12 MMOL/L (ref 8–16)
AST SERPL-CCNC: 14 U/L (ref 10–40)
B-HCG UR QL: NEGATIVE
BASOPHILS # BLD AUTO: 0.02 K/UL (ref 0–0.2)
BASOPHILS NFR BLD: 0.1 % (ref 0–1.9)
BILIRUB SERPL-MCNC: 0.2 MG/DL (ref 0.1–1)
BNP SERPL-MCNC: 20 PG/ML (ref 0–99)
BUN SERPL-MCNC: 8 MG/DL (ref 6–20)
CALCIUM SERPL-MCNC: 8.8 MG/DL (ref 8.7–10.5)
CHLORIDE SERPL-SCNC: 107 MMOL/L (ref 95–110)
CO2 SERPL-SCNC: 23 MMOL/L (ref 23–29)
CREAT SERPL-MCNC: 0.7 MG/DL (ref 0.5–1.4)
CTP QC/QA: YES
D DIMER PPP IA.FEU-MCNC: 0.31 MG/L FEU
DIFFERENTIAL METHOD: ABNORMAL
EOSINOPHIL # BLD AUTO: 0 K/UL (ref 0–0.5)
EOSINOPHIL NFR BLD: 0 % (ref 0–8)
ERYTHROCYTE [DISTWIDTH] IN BLOOD BY AUTOMATED COUNT: 11.9 % (ref 11.5–14.5)
EST. GFR  (NO RACE VARIABLE): >60 ML/MIN/1.73 M^2
GLUCOSE SERPL-MCNC: 143 MG/DL (ref 70–110)
HCT VFR BLD AUTO: 39.5 % (ref 37–48.5)
HGB BLD-MCNC: 13.8 G/DL (ref 12–16)
IMM GRANULOCYTES # BLD AUTO: 0.09 K/UL (ref 0–0.04)
IMM GRANULOCYTES NFR BLD AUTO: 0.5 % (ref 0–0.5)
LYMPHOCYTES # BLD AUTO: 1.2 K/UL (ref 1–4.8)
LYMPHOCYTES NFR BLD: 6.4 % (ref 18–48)
MCH RBC QN AUTO: 31.7 PG (ref 27–31)
MCHC RBC AUTO-ENTMCNC: 34.9 G/DL (ref 32–36)
MCV RBC AUTO: 91 FL (ref 82–98)
MONOCYTES # BLD AUTO: 1.2 K/UL (ref 0.3–1)
MONOCYTES NFR BLD: 6.3 % (ref 4–15)
NEUTROPHILS # BLD AUTO: 16.7 K/UL (ref 1.8–7.7)
NEUTROPHILS NFR BLD: 86.7 % (ref 38–73)
NRBC BLD-RTO: 0 /100 WBC
PLATELET # BLD AUTO: 295 K/UL (ref 150–450)
PMV BLD AUTO: 12.2 FL (ref 9.2–12.9)
POTASSIUM SERPL-SCNC: 3.4 MMOL/L (ref 3.5–5.1)
PROT SERPL-MCNC: 7.4 G/DL (ref 6–8.4)
RBC # BLD AUTO: 4.35 M/UL (ref 4–5.4)
SODIUM SERPL-SCNC: 142 MMOL/L (ref 136–145)
TROPONIN I SERPL DL<=0.01 NG/ML-MCNC: <0.006 NG/ML (ref 0–0.03)
WBC # BLD AUTO: 19.22 K/UL (ref 3.9–12.7)

## 2023-02-11 PROCEDURE — 83880 ASSAY OF NATRIURETIC PEPTIDE: CPT | Performed by: EMERGENCY MEDICINE

## 2023-02-11 PROCEDURE — 85025 COMPLETE CBC W/AUTO DIFF WBC: CPT | Performed by: EMERGENCY MEDICINE

## 2023-02-11 PROCEDURE — 84484 ASSAY OF TROPONIN QUANT: CPT | Performed by: EMERGENCY MEDICINE

## 2023-02-11 PROCEDURE — 80053 COMPREHEN METABOLIC PANEL: CPT | Performed by: EMERGENCY MEDICINE

## 2023-02-11 PROCEDURE — 81025 URINE PREGNANCY TEST: CPT | Performed by: EMERGENCY MEDICINE

## 2023-02-11 PROCEDURE — 85379 FIBRIN DEGRADATION QUANT: CPT | Performed by: EMERGENCY MEDICINE

## 2023-02-11 NOTE — DISCHARGE INSTRUCTIONS
Call to schedule follow up appointment with your neurologist within the next 1 week.  Follow up with the primary care provider in the next 2-3 days.

## 2023-02-11 NOTE — ED TRIAGE NOTES
Pt comes to the er via pov with c/o chest pain, shortness of breath, and abdominal pains that started around 2130. Pt was newly diagnosed with MS. Pt stated that she has been taking methylprednisolone for 3 days.

## 2023-02-12 ENCOUNTER — HOSPITAL ENCOUNTER (EMERGENCY)
Facility: HOSPITAL | Age: 23
Discharge: HOME | End: 2023-02-12
Attending: EMERGENCY MEDICINE
Payer: COMMERCIAL

## 2023-02-12 VITALS
RESPIRATION RATE: 16 BRPM | BODY MASS INDEX: 34.96 KG/M2 | SYSTOLIC BLOOD PRESSURE: 128 MMHG | OXYGEN SATURATION: 97 % | DIASTOLIC BLOOD PRESSURE: 77 MMHG | HEIGHT: 62 IN | TEMPERATURE: 99 F | HEART RATE: 69 BPM | WEIGHT: 190 LBS

## 2023-02-12 DIAGNOSIS — T50.905A MEDICATION SIDE EFFECT, INITIAL ENCOUNTER: Primary | ICD-10-CM

## 2023-02-12 DIAGNOSIS — Z76.89 ENCOUNTER FOR MEDICATION ADMINISTRATION: ICD-10-CM

## 2023-02-12 PROCEDURE — 99284 EMERGENCY DEPT VISIT MOD MDM: CPT | Mod: 25

## 2023-02-12 PROCEDURE — 99284 EMERGENCY DEPT VISIT MOD MDM: CPT | Mod: ,,, | Performed by: EMERGENCY MEDICINE

## 2023-02-12 PROCEDURE — 25000003 PHARM REV CODE 250: Performed by: EMERGENCY MEDICINE

## 2023-02-12 PROCEDURE — 99284 PR EMERGENCY DEPT VISIT,LEVEL IV: ICD-10-PCS | Mod: ,,, | Performed by: EMERGENCY MEDICINE

## 2023-02-12 PROCEDURE — 63600175 PHARM REV CODE 636 W HCPCS: Performed by: EMERGENCY MEDICINE

## 2023-02-12 PROCEDURE — 96365 THER/PROPH/DIAG IV INF INIT: CPT

## 2023-02-12 RX ADMIN — METHYLPREDNISOLONE SODIUM SUCCINATE 1000 MG: 1 INJECTION, POWDER, LYOPHILIZED, FOR SOLUTION INTRAMUSCULAR; INTRAVENOUS at 10:02

## 2023-02-13 ENCOUNTER — TELEPHONE (OUTPATIENT)
Dept: INTERVENTIONAL RADIOLOGY/VASCULAR | Facility: HOSPITAL | Age: 23
End: 2023-02-13

## 2023-02-13 ENCOUNTER — PATIENT MESSAGE (OUTPATIENT)
Dept: NEUROLOGY | Facility: CLINIC | Age: 23
End: 2023-02-13
Payer: COMMERCIAL

## 2023-02-13 LAB — PATHOLOGIST INTERPRETATION SPE: NORMAL

## 2023-02-13 NOTE — TELEPHONE ENCOUNTER
Spoke to patient, pt states procedure is being done at [facility]. Pt said she will contact them for scheduling.

## 2023-02-13 NOTE — ED PROVIDER NOTES
"Encounter Date: 2/12/2023    SCRIBE #1 NOTE: I, Monica Younger, am scribing for, and in the presence of,  Jazmin Castro MD. I have scribed the following portions of the note - Other sections scribed: HPI, ROS, PE.     History     Chief Complaint   Patient presents with    Medication Reaction     States put on steroids for her MS and the dose is too high and she needs it fixed     Time patient was seen by the provider: 9:27 PM      The patient is a 22 y.o. female with past medical history of recently diagnosed with MS, seizures, Factor 5 Leiden mutation who presents to the ED with a complaint of feeling "off" since being on PO steroids for MS for the past few days. She explains that the PO steroids cause agitation, palpitations, SOB, and brain fog. She feels as though she can not "mentally handle" taking the last dose of the medication tonight. She explains that she was not having these symptoms when taking the steroids via IV while is the hospital, so she would like to have her last dose via IV. She went to the ED 2 days ago for these symptoms at which time her symptoms had improved with sleep. She denies SI, HI, depression, and difficulty sleeping.    The history is provided by the patient, medical records, a relative and a parent. No  was used.   Review of patient's allergies indicates:  No Known Allergies  Past Medical History:   Diagnosis Date    Factor 5 Leiden mutation, heterozygous 9/13/2016    Seizures     1 at the age of 10     Past Surgical History:   Procedure Laterality Date    TONSILLECTOMY  6 years old     Family History   Problem Relation Age of Onset    Hypertension Mother     Irregular menses Mother     Factor V Leiden deficiency Mother     Interstitial cystitis Mother     No Known Problems Father     Factor V Leiden deficiency Sister     Interstitial cystitis Maternal Aunt     Factor V Leiden deficiency Maternal Grandmother     Thyroid disease Maternal Grandmother     "     hypothyroidism    Mitral valve prolapse Maternal Grandfather     Congenital heart disease Maternal Grandfather         valve    Crohn's disease Paternal Grandmother     Breast cancer Neg Hx     Colon cancer Neg Hx     Ovarian cancer Neg Hx     Arrhythmia Neg Hx     Early death Neg Hx     Heart attacks under age 50 Neg Hx     Pacemaker/defibrilator Neg Hx      Social History     Tobacco Use    Smoking status: Never    Smokeless tobacco: Never   Substance Use Topics    Alcohol use: No    Drug use: No     Review of Systems   Constitutional:  Negative for fever.   HENT:  Negative for sore throat.    Respiratory:  Positive for shortness of breath.    Cardiovascular:  Positive for palpitations. Negative for chest pain.   Gastrointestinal:  Negative for nausea.   Genitourinary:  Negative for dysuria.   Musculoskeletal:  Negative for back pain.   Skin:  Negative for rash.   Neurological:  Negative for weakness.   Hematological:  Does not bruise/bleed easily.   Psychiatric/Behavioral:  Positive for agitation and confusion. Negative for sleep disturbance and suicidal ideas.      Physical Exam     Initial Vitals [02/12/23 1930]   BP Pulse Resp Temp SpO2   (!) 148/109 104 20 98.2 °F (36.8 °C) 99 %      MAP       --         Physical Exam    Nursing note and vitals reviewed.  Constitutional: Vital signs are normal. She appears well-developed and well-nourished.  Non-toxic appearance. She does not appear ill.   HENT:   Head: Normocephalic and atraumatic.   Mouth/Throat: Mucous membranes are normal. Mucous membranes are not dry.   Eyes: Conjunctivae and lids are normal.   Neck: Neck supple.   Normal range of motion.  Cardiovascular:  Normal rate.           Musculoskeletal:      Cervical back: Normal range of motion and neck supple.     Neurological: She is alert and oriented to person, place, and time.   Skin: Skin is dry and intact. No pallor.   Psychiatric: She has a normal mood and affect. Her speech is normal and behavior  is normal.       ED Course   Procedures  Labs Reviewed - No data to display       Imaging Results    None          Medications   methylPREDNISolone sodium succinate (SOLU-MEDROL) 1,000 mg in dextrose 5 % (D5W) 100 mL IVPB (0 mg Intravenous Stopped 2/12/23 2247)     Medical Decision Making:   History:   Old Medical Records: I decided to obtain old medical records.  Old Records Summarized: records from clinic visits and records from previous admission(s).  Differential Diagnosis:   Medication side effect  ED Management:  Pt presented to receive an IV dose of the oral steroid she was prescribed  She was recently diagnosed with MS and is not tolerating the oral version of the steroid but had no trouble with the iv form  Pt was given a dose of methylprednisolone 1000 mg  Tolerated well  Discharged to home in stable condition, return to ED warnings given, follow up and patient care instructions given.            Scribe Attestation:   Scribe #1: I performed the above scribed service and the documentation accurately describes the services I performed. I attest to the accuracy of the note.                   Clinical Impression:   Final diagnoses:  [T50.905A] Medication side effect, initial encounter (Primary)  [Z76.89] Encounter for medication administration        ED Disposition Condition    Discharge Stable          ED Prescriptions    None       Follow-up Information       Follow up With Specialties Details Why Contact Info    Chris Rendon MD Family Medicine   [address] [facility] [place] [address]  [phone]      Marcela Rendon MD Neurology   [address]  [address]  [facility] [address]  [phone]               Jazmin Castro MD  02/14/23 2244

## 2023-02-13 NOTE — ED TRIAGE NOTES
Chief Complaint   Patient presents with    Medication Reaction     States put on steroids for her MS and the dose is too high and she needs it fixed     APPEARANCE: No acute distress.    NEURO: Awake, alert, appropriate for age  HEENT: Head symmetrical. No obvious deformity  RESPIRATORY: Airway is open and patent. Respirations are spontaneous on room air.   NEUROVASCULAR: All extremities are warm and pink with capillary refill less than 3 seconds.   MUSCULOSKELETAL: Moves all extremities, wiggling toes and moving hands.   SKIN: Warm and dry, adequate turgor, mucus membranes moist and pink  SOCIAL: Patient is accompanied by family.   Will continue to monitor.

## 2023-02-13 NOTE — DISCHARGE INSTRUCTIONS
You received an IV dose of the methylprednisolone steroid.    I recommend follow up with your doctor as planned.    Seek immediate medical attention if you have fever, severe headache, altered mental status, or for any other concern.

## 2023-02-14 LAB — VIT B1 BLD-MCNC: 83 UG/L (ref 38–122)

## 2023-02-15 LAB — PYRIDOXAL SERPL-MCNC: 5 UG/L (ref 5–50)

## 2023-02-17 ENCOUNTER — LAB VISIT (OUTPATIENT)
Dept: LAB | Facility: HOSPITAL | Age: 23
End: 2023-02-17
Attending: INTERNAL MEDICINE
Payer: COMMERCIAL

## 2023-02-17 ENCOUNTER — OFFICE VISIT (OUTPATIENT)
Dept: INFECTIOUS DISEASES | Facility: CLINIC | Age: 23
End: 2023-02-17
Payer: COMMERCIAL

## 2023-02-17 VITALS
WEIGHT: 196.19 LBS | TEMPERATURE: 98 F | SYSTOLIC BLOOD PRESSURE: 140 MMHG | DIASTOLIC BLOOD PRESSURE: 89 MMHG | HEART RATE: 89 BPM | HEIGHT: 62 IN | BODY MASS INDEX: 36.1 KG/M2

## 2023-02-17 DIAGNOSIS — R20.2 NUMBNESS AND TINGLING IN BOTH HANDS: ICD-10-CM

## 2023-02-17 DIAGNOSIS — R21 RASH: ICD-10-CM

## 2023-02-17 DIAGNOSIS — G37.9 DEMYELINATING DISEASE: Primary | ICD-10-CM

## 2023-02-17 DIAGNOSIS — R20.0 NUMBNESS AND TINGLING IN BOTH HANDS: ICD-10-CM

## 2023-02-17 DIAGNOSIS — G37.9 DEMYELINATING DISEASE: ICD-10-CM

## 2023-02-17 PROCEDURE — 86682 HELMINTH ANTIBODY: CPT | Mod: 91 | Performed by: INTERNAL MEDICINE

## 2023-02-17 PROCEDURE — 86618 LYME DISEASE ANTIBODY: CPT | Performed by: INTERNAL MEDICINE

## 2023-02-17 PROCEDURE — 99205 PR OFFICE/OUTPT VISIT, NEW, LEVL V, 60-74 MIN: ICD-10-PCS | Mod: S$GLB,,, | Performed by: INTERNAL MEDICINE

## 2023-02-17 PROCEDURE — 99205 OFFICE O/P NEW HI 60 MIN: CPT | Mod: S$GLB,,, | Performed by: INTERNAL MEDICINE

## 2023-02-17 PROCEDURE — 3008F BODY MASS INDEX DOCD: CPT | Mod: CPTII,S$GLB,, | Performed by: INTERNAL MEDICINE

## 2023-02-17 PROCEDURE — 3008F PR BODY MASS INDEX (BMI) DOCUMENTED: ICD-10-PCS | Mod: CPTII,S$GLB,, | Performed by: INTERNAL MEDICINE

## 2023-02-17 PROCEDURE — 3079F PR MOST RECENT DIASTOLIC BLOOD PRESSURE 80-89 MM HG: ICD-10-PCS | Mod: CPTII,S$GLB,, | Performed by: INTERNAL MEDICINE

## 2023-02-17 PROCEDURE — 3079F DIAST BP 80-89 MM HG: CPT | Mod: CPTII,S$GLB,, | Performed by: INTERNAL MEDICINE

## 2023-02-17 PROCEDURE — 1111F DSCHRG MED/CURRENT MED MERGE: CPT | Mod: CPTII,S$GLB,, | Performed by: INTERNAL MEDICINE

## 2023-02-17 PROCEDURE — 86682 HELMINTH ANTIBODY: CPT | Performed by: INTERNAL MEDICINE

## 2023-02-17 PROCEDURE — 1111F PR DISCHARGE MEDS RECONCILED W/ CURRENT OUTPATIENT MED LIST: ICD-10-PCS | Mod: CPTII,S$GLB,, | Performed by: INTERNAL MEDICINE

## 2023-02-17 PROCEDURE — 99999 PR PBB SHADOW E&M-EST. PATIENT-LVL III: ICD-10-PCS | Mod: PBBFAC,,, | Performed by: INTERNAL MEDICINE

## 2023-02-17 PROCEDURE — 86753 PROTOZOA ANTIBODY NOS: CPT | Performed by: INTERNAL MEDICINE

## 2023-02-17 PROCEDURE — 99999 PR PBB SHADOW E&M-EST. PATIENT-LVL III: CPT | Mod: PBBFAC,,, | Performed by: INTERNAL MEDICINE

## 2023-02-17 PROCEDURE — 87662 ZIKA VIRUS DNA/RNA AMP PROBE: CPT | Performed by: INTERNAL MEDICINE

## 2023-02-17 PROCEDURE — 3077F PR MOST RECENT SYSTOLIC BLOOD PRESSURE >= 140 MM HG: ICD-10-PCS | Mod: CPTII,S$GLB,, | Performed by: INTERNAL MEDICINE

## 2023-02-17 PROCEDURE — 3077F SYST BP >= 140 MM HG: CPT | Mod: CPTII,S$GLB,, | Performed by: INTERNAL MEDICINE

## 2023-02-17 NOTE — PROGRESS NOTES
Patients wife called requesting refill on Percocet r/t fact they were given a 3 day supply upon discharge. Lorri Orantes CNP sent in refill request to patients pharmacy. Wife Julia Thornton made aware and voices understanding. Subjective:      Patient ID: Zeny Charles is a 22 y.o. female.    Chief Complaint:Lyme Disease      History of Present Illness    22 year old female with no prior medical history who self referred to me for evaluation.  She was in good health until the summer of 2022.  She went to [place] [facility] just off of [place].  She was there for 3 months.  She had an upper respiratory tract infection (cough, congestion).  She did a test for covid that was 'faintly' positive per patient.  She bought a Azithromycin over the counter and finished them.  She slowly started feeling better while in [place].  She spent 3 months there over the summer.   She traveled to [facility] again in October for 1 month.  She developed respiratory symptoms again and broke out with a rash again.  The rash was at the bottom her feet and lower legs.  It was associated with intense itching. It lasted for about 5 days.  She tried benadryl and calamine lotion.  The rash went away but she has had persistent numbness since then.  She still has numbness in her feet and hands.  She received steroid therapy and says that it improved significantly with steroids.  Her work up included an MRI brain and cervical spine that showed a demyelinating process.      She is here because they would like to know if there any infections that could explain what is going on with her.  [place], cats and horses in the area.  Many of the dogs have ticks.  She describes walking around barefoot while she is there.    Medical History  None    Surgical History  Tonsillectomy    Family HIstory  None    Social History  Born and raised in [place].  Multiple trips to [place] while growing up.  She owns 2 dogs and 1 cat here.  She doesn't smoke.  Rare ETOH use.  She denies any illicit drug use.          Review of Systems   Constitutional: Positive for malaise/fatigue. Negative for chills, decreased appetite, fever,  night sweats, weight gain and weight loss.   HENT:  Positive for ear pain. Negative for congestion, hearing loss, hoarse voice, sore throat and tinnitus.    Eyes:  Negative for blurred vision, redness and visual disturbance.   Cardiovascular:  Negative for chest pain, leg swelling and palpitations.   Respiratory:  Negative for cough, hemoptysis, shortness of breath, sputum production and wheezing.    Hematologic/Lymphatic: Negative for adenopathy. Does not bruise/bleed easily.   Skin:  Negative for dry skin, itching, rash and suspicious lesions.   Musculoskeletal:  Positive for back pain. Negative for joint pain, myalgias and neck pain.   Gastrointestinal:  Negative for abdominal pain, constipation, diarrhea, heartburn, nausea and vomiting.   Genitourinary:  Negative for dysuria, flank pain, frequency, hematuria, hesitancy and urgency.   Neurological:  Positive for numbness and paresthesias. Negative for dizziness, headaches and weakness.   Psychiatric/Behavioral:  Negative for depression and memory loss. The patient does not have insomnia and is not nervous/anxious.    Objective:   Physical Exam  Vitals and nursing note reviewed.   Constitutional:       General: She is not in acute distress.     Appearance: She is well-developed. She is not diaphoretic.   HENT:      Head: Normocephalic and atraumatic.      Right Ear: External ear normal.      Left Ear: External ear normal.      Nose: Nose normal.      Mouth/Throat:      Pharynx: No oropharyngeal exudate.   Eyes:      General: No scleral icterus.        Right eye: No discharge.         Left eye: No discharge.      Conjunctiva/sclera: Conjunctivae normal.      Pupils: Pupils are equal, round, and reactive to light.   Neck:      Thyroid: No thyromegaly.      Vascular: No JVD.      Trachea: No tracheal deviation.   Cardiovascular:      Rate and Rhythm: Normal rate and regular rhythm.      Heart sounds: No murmur heard.    No friction rub. No gallop.   Pulmonary:       Effort: Pulmonary effort is normal. No respiratory distress.      Breath sounds: Normal breath sounds. No stridor. No wheezing or rales.   Chest:      Chest wall: No tenderness.   Abdominal:      General: Bowel sounds are normal. There is no distension.      Palpations: Abdomen is soft. There is no mass.      Tenderness: There is no abdominal tenderness. There is no guarding or rebound.   Musculoskeletal:         General: No tenderness. Normal range of motion.      Cervical back: Normal range of motion and neck supple.   Lymphadenopathy:      Cervical: No cervical adenopathy.   Skin:     General: Skin is warm.      Coloration: Skin is not pale.      Findings: No erythema or rash.   Neurological:      Mental Status: She is alert and oriented to person, place, and time.      Cranial Nerves: No cranial nerve deficit.      Motor: No abnormal muscle tone.      Coordination: Coordination normal.      Deep Tendon Reflexes: Reflexes normal.   Psychiatric:         Behavior: Behavior normal.         Thought Content: Thought content normal.         Judgment: Judgment normal.     Assessment:       1. Demyelinating disease    2. Numbness and tingling in both hands    3. Rash        22 year old female presenting with numbness and MRI evidence of a demyelinating process in her CNS.  This all appeared to follow a trips to [place] where she was diagnosed with COVID and on a later trip developed a lower extremity rash.  The family would like to know if there are any infections that could explain her symptoms.  There are a few reports of neurological complications presenting as post covid infection complications.  I wonder if this may be the underlying cause.  I will check for a few vector borne and parasitic infections.  I will try to refer her to a specialist in multiple sclerosis.  Plan:       Demyelinating disease  -     LYME DISEASE ANTIBODY BY EIA; Future; Expected date: 02/17/2023  -     Strongyloides IgG Antibodies; Future;  Expected date: 02/17/2023  -     CYSTICERCOSIS ANTIBODY, IGG; Future; Expected date: 02/17/2023  -     T. CRUZI AB; Future; Expected date: 02/17/2023  -     Cancel: Misc Sendout Test, Blood Zika Igg and IgM; Future; Expected date: 02/17/2023    Numbness and tingling in both hands  -     LYME DISEASE ANTIBODY BY EIA; Future; Expected date: 02/17/2023  -     Strongyloides IgG Antibodies; Future; Expected date: 02/17/2023  -     CYSTICERCOSIS ANTIBODY, IGG; Future; Expected date: 02/17/2023  -     T. CRUZI AB; Future; Expected date: 02/17/2023  -     Cancel: Misc Sendout Test, Blood Zika Igg and IgM; Future; Expected date: 02/17/2023    Rash  -     LYME DISEASE ANTIBODY BY EIA; Future; Expected date: 02/17/2023  -     Strongyloides IgG Antibodies; Future; Expected date: 02/17/2023  -     CYSTICERCOSIS ANTIBODY, IGG; Future; Expected date: 02/17/2023  -     T. CRUZI AB; Future; Expected date: 02/17/2023  -     Cancel: Misc Sendout Test, Blood Zika Igg and IgM; Future; Expected date: 02/17/2023

## 2023-02-19 ENCOUNTER — PATIENT MESSAGE (OUTPATIENT)
Dept: INFECTIOUS DISEASES | Facility: CLINIC | Age: 23
End: 2023-02-19
Payer: COMMERCIAL

## 2023-02-20 LAB
B BURGDOR AB SER IA-ACNC: 0.09 INDEX VALUE
STRONGYLOIDES ANTIBODY IGG: NEGATIVE

## 2023-02-21 LAB
HAS PATIENT BEEN SYMPTOMATIC?: YES
HAS PATIENT HAD ZIKA EXPOSURE?: YES
IMMUNOLOGIST REVIEW: NORMAL
IS PATIENT PREGNANT?: NO
ZIKV RNA SPEC QL NAA+PROBE: NEGATIVE

## 2023-02-22 ENCOUNTER — OFFICE VISIT (OUTPATIENT)
Dept: PSYCHIATRY | Facility: CLINIC | Age: 23
End: 2023-02-22
Payer: COMMERCIAL

## 2023-02-22 DIAGNOSIS — F90.0 ADHD (ATTENTION DEFICIT HYPERACTIVITY DISORDER), INATTENTIVE TYPE: Primary | ICD-10-CM

## 2023-02-22 PROCEDURE — 1111F DSCHRG MED/CURRENT MED MERGE: CPT | Mod: CPTII,95,, | Performed by: NURSE PRACTITIONER

## 2023-02-22 PROCEDURE — 1160F PR REVIEW ALL MEDS BY PRESCRIBER/CLIN PHARMACIST DOCUMENTED: ICD-10-PCS | Mod: CPTII,95,, | Performed by: NURSE PRACTITIONER

## 2023-02-22 PROCEDURE — 99213 PR OFFICE/OUTPT VISIT, EST, LEVL III, 20-29 MIN: ICD-10-PCS | Mod: 95,,, | Performed by: NURSE PRACTITIONER

## 2023-02-22 PROCEDURE — 1111F PR DISCHARGE MEDS RECONCILED W/ CURRENT OUTPATIENT MED LIST: ICD-10-PCS | Mod: CPTII,95,, | Performed by: NURSE PRACTITIONER

## 2023-02-22 PROCEDURE — 1159F PR MEDICATION LIST DOCUMENTED IN MEDICAL RECORD: ICD-10-PCS | Mod: CPTII,95,, | Performed by: NURSE PRACTITIONER

## 2023-02-22 PROCEDURE — 1160F RVW MEDS BY RX/DR IN RCRD: CPT | Mod: CPTII,95,, | Performed by: NURSE PRACTITIONER

## 2023-02-22 PROCEDURE — 1159F MED LIST DOCD IN RCRD: CPT | Mod: CPTII,95,, | Performed by: NURSE PRACTITIONER

## 2023-02-22 PROCEDURE — 99213 OFFICE O/P EST LOW 20 MIN: CPT | Mod: 95,,, | Performed by: NURSE PRACTITIONER

## 2023-02-22 RX ORDER — DEXTROAMPHETAMINE SACCHARATE, AMPHETAMINE ASPARTATE, DEXTROAMPHETAMINE SULFATE AND AMPHETAMINE SULFATE 2.5; 2.5; 2.5; 2.5 MG/1; MG/1; MG/1; MG/1
30 TABLET ORAL 2 TIMES DAILY
Qty: 60 TABLET | Refills: 0 | Status: SHIPPED | OUTPATIENT
Start: 2023-04-20 | End: 2023-02-28

## 2023-02-22 RX ORDER — DEXTROAMPHETAMINE SACCHARATE, AMPHETAMINE ASPARTATE, DEXTROAMPHETAMINE SULFATE AND AMPHETAMINE SULFATE 2.5; 2.5; 2.5; 2.5 MG/1; MG/1; MG/1; MG/1
10 TABLET ORAL 2 TIMES DAILY
Qty: 60 TABLET | Refills: 0 | Status: SHIPPED | OUTPATIENT
Start: 2023-02-22 | End: 2023-03-15 | Stop reason: SDUPTHER

## 2023-02-22 RX ORDER — DEXTROAMPHETAMINE SACCHARATE, AMPHETAMINE ASPARTATE, DEXTROAMPHETAMINE SULFATE AND AMPHETAMINE SULFATE 2.5; 2.5; 2.5; 2.5 MG/1; MG/1; MG/1; MG/1
10 TABLET ORAL 2 TIMES DAILY
Qty: 60 TABLET | Refills: 0 | Status: SHIPPED | OUTPATIENT
Start: 2023-03-21 | End: 2023-02-28

## 2023-02-22 NOTE — PROGRESS NOTES
Outpatient Psychiatry Follow-Up Visit (MD/NP)    2/22/2023    Clinical Status of Patient:  Outpatient (Ambulatory)    Chief Complaint:  Zeny Charles is a 22 y.o. female who presents today for follow-up of attention problems.  Met with patient.      Last visit was: 5/25/22. Chart and  reviewed  The patient location is: home  The chief complaint leading to consultation is: attention problems    Visit type: audiovisual    Face to Face time with patient: 29 minutes  30 minutes of total time spent on the encounter, which includes face to face time and non-face to face time preparing to see the patient (eg, review of tests), Obtaining and/or reviewing separately obtained history, Documenting clinical information in the electronic or other health record, Independently interpreting results (not separately reported) and communicating results to the patient/family/caregiver, or Care coordination (not separately reported).     Each patient to whom he or she provides medical services by telemedicine is:  (1) informed of the relationship between the physician and patient and the respective role of any other health care provider with respect to management of the patient; and (2) notified that he or she may decline to receive medical services by telemedicine and may withdraw from such care at any time.    Interval History and Content of Current Session:  Current Psychiatric MedicatioCounseling this visit focused on building adaptive coping skills, medication teaching, and cognitive behavioral therapy (CBT)ns/changes  Start Adderall 10 mg po BID    Virtual Visit: Pt reports she is doing well with Adderall for managing ADHD sx. Reports mild side effects of headaches when it wears off but wishes to continue medication since it has been effective. Affect appears bright with euthymic mood. Thought processes are linear, clear, and organized. Denies SI/HI/AVH.     Psychotherapy:  Target symptoms: distractability  Why chosen therapy is  appropriate versus another modality: relevant to diagnosis  Outcome monitoring methods: self-report  Therapeutic intervention type: insight oriented psychotherapy  Topics discussed/themes: building skills sets for symptom management, symptom recognition  The patient's response to the intervention is accepting. The patient's progress toward treatment goals is good.   Duration of intervention: 14 minutes.    Review of Systems   PSYCHIATRIC: Pertinant items are noted in the narrative.  CONSTITUTIONAL: No weight gain or loss.   MUSCULOSKELETAL: No pain or stiffness of the joints.  NEUROLOGIC: No weakness, sensory changes, seizures, confusion, memory loss, tremor or other abnormal movements.  ENDOCRINE: No polydipsia or polyuria.  INTEGUMENTARY: No rashes or lacerations.  EYES: No exophthalmos, jaundice or blindness.  ENT: No dizziness, tinnitus or hearing loss.  RESPIRATORY: No shortness of breath.  CARDIOVASCULAR: No tachycardia or chest pain.  GASTROINTESTINAL: No nausea, vomiting, pain, constipation or diarrhea.  GENITOURINARY: No frequency, dysuria or sexual dysfunction.  HEMATOLOGIC/LYMPHATIC: No excessive bleeding, prolonged or excessive bleeding after dental extraction/injury.  ALLERGIC/IMMUNOLOGIC: No allergic response to materials, foods or animals at this time.    Past Medical, Family and Social History: The patient's past medical, family and social history have been reviewed and updated as appropriate within the electronic medical record - see encounter notes.    Compliance: yes    Side effects: None    Risk Parameters:  Patient reports no suicidal ideation  Patient reports no homicidal ideation  Patient reports no self-injurious behavior  Patient reports no violent behavior    Exam (detailed: at least 9 elements; comprehensive: all 15 elements)   Constitutional  Vitals:  Most recent vital signs, dated greater than 90 days prior to this appointment, were reviewed.   There were no vitals filed for this  visit.     General:  unremarkable, age appropriate     Musculoskeletal  Muscle Strength/Tone:  not examined   Gait & Station:  non-ataxic     Psychiatric  Speech:  no latency; no press   Mood & Affect:  steady  congruent and appropriate   Thought Process:  normal and logical   Associations:  intact   Thought Content:  normal, no suicidality, no homicidality, delusions, or paranoia   Insight:  intact   Judgement: behavior is adequate to circumstances   Orientation:  grossly intact   Memory: intact for content of interview   Language: grossly intact   Attention Span & Concentration:  able to focus   Fund of Knowledge:  intact and appropriate to age and level of education     Assessment and Diagnosis   Status/Progress: Based on the examination today, the patient's problem(s) is/are improved.  New problems have not been presented today.   Co-morbidities and Lack of compliance are not complicating management of the primary condition.  There are no active rule-out diagnoses for this patient at this time.     General Impression:       ICD-10-CM ICD-9-CM   1. ADHD (attention deficit hyperactivity disorder), inattentive type  F90.0 314.00       Intervention/Counseling/Treatment Plan   Medication Management: The risks and benefits of medication were discussed with the patient.  Continue Adderall 10 mg po BID    Return to Clinic: 3 months    Risks, benefits, side effects and alternative treatments discussed with patient. Patient agrees with the current plan as documented.  Encouraged Patient to keep future appointments.  Take medications as prescribed and abstain from substance abuse.  Pt to present to ED for thoughts to harm herself or others

## 2023-02-23 ENCOUNTER — TELEPHONE (OUTPATIENT)
Dept: NEUROLOGY | Facility: CLINIC | Age: 23
End: 2023-02-23
Payer: COMMERCIAL

## 2023-02-23 LAB
T CRUZI AB SERPL QL IA: NONREACTIVE
T SOL LAR IGG SER QL IB: NEGATIVE

## 2023-02-23 NOTE — TELEPHONE ENCOUNTER
----- Message from Jory Figueroa MA sent at 2/16/2023 11:23 AM CST -----  Contact: [phone]  Patient's mother is calling to schedule appt for MS. Pt's mother stated that the ER doctor told her she needs to follow-up asap.   Pt access tried but no asap appts are available. Next avail would be 05/03/23.   The patient's mother would like to be reached at [phone].

## 2023-02-28 ENCOUNTER — OFFICE VISIT (OUTPATIENT)
Dept: DERMATOLOGY | Facility: CLINIC | Age: 23
End: 2023-02-28
Payer: COMMERCIAL

## 2023-02-28 DIAGNOSIS — L70.0 ACNE VULGARIS: Primary | ICD-10-CM

## 2023-02-28 DIAGNOSIS — L81.9 DYSCHROMIA: ICD-10-CM

## 2023-02-28 PROCEDURE — 99203 PR OFFICE/OUTPT VISIT, NEW, LEVL III, 30-44 MIN: ICD-10-PCS | Mod: 95,,, | Performed by: DERMATOLOGY

## 2023-02-28 PROCEDURE — 99203 OFFICE O/P NEW LOW 30 MIN: CPT | Mod: 95,,, | Performed by: DERMATOLOGY

## 2023-02-28 PROCEDURE — 1160F RVW MEDS BY RX/DR IN RCRD: CPT | Mod: CPTII,95,, | Performed by: DERMATOLOGY

## 2023-02-28 PROCEDURE — 1160F PR REVIEW ALL MEDS BY PRESCRIBER/CLIN PHARMACIST DOCUMENTED: ICD-10-PCS | Mod: CPTII,95,, | Performed by: DERMATOLOGY

## 2023-02-28 PROCEDURE — 1111F PR DISCHARGE MEDS RECONCILED W/ CURRENT OUTPATIENT MED LIST: ICD-10-PCS | Mod: CPTII,95,, | Performed by: DERMATOLOGY

## 2023-02-28 PROCEDURE — 1159F MED LIST DOCD IN RCRD: CPT | Mod: CPTII,95,, | Performed by: DERMATOLOGY

## 2023-02-28 PROCEDURE — 1159F PR MEDICATION LIST DOCUMENTED IN MEDICAL RECORD: ICD-10-PCS | Mod: CPTII,95,, | Performed by: DERMATOLOGY

## 2023-02-28 PROCEDURE — 1111F DSCHRG MED/CURRENT MED MERGE: CPT | Mod: CPTII,95,, | Performed by: DERMATOLOGY

## 2023-02-28 RX ORDER — CLINDAMYCIN PHOSPHATE 10 MG/G
GEL TOPICAL 2 TIMES DAILY
Qty: 60 G | Refills: 5 | Status: SHIPPED | OUTPATIENT
Start: 2023-02-28 | End: 2023-08-10

## 2023-02-28 NOTE — PROGRESS NOTES
Subjective:       Patient ID:  Zeny Charles is a 22 y.o. female who presents for No chief complaint on file.    The patient location is: home  The chief complaint leading to consultation is: acne    Visit type: audiovisual    Face to Face time with patient: 15 min  20 minutes of total time spent on the encounter, which includes face to face time and non-face to face time preparing to see the patient (eg, review of tests), Obtaining and/or reviewing separately obtained history, Documenting clinical information in the electronic or other health record, Independently interpreting results (not separately reported) and communicating results to the patient/family/caregiver, or Care coordination (not separately reported).         Each patient to whom he or she provides medical services by telemedicine is:  (1) informed of the relationship between the physician and patient and the respective role of any other health care provider with respect to management of the patient; and (2) notified that he or she may decline to receive medical services by telemedicine and may withdraw from such care at any time.    Notes:     Hx of Multiple Sclerosis, Factor V Leiden and seizures    History of Present Illness: The patient presents with chief complaint of acne.  Location: face  Duration: several months  Signs/Symptoms: none    Prior treatments: cetaphil face wash and moisturizer        Review of Systems   Constitutional:  Negative for fever and chills.   Gastrointestinal:  Negative for nausea and vomiting.   Skin:  Positive for activity-related sunscreen use. Negative for daily sunscreen use and recent sunburn.   Hematologic/Lymphatic: Does not bruise/bleed easily.      Objective:    Physical Exam   Constitutional: She appears well-developed and well-nourished. No distress.   Neurological: She is alert and oriented to person, place, and time. She is not disoriented.   Psychiatric: She has a normal mood and affect.   Skin:   Areas  Examined (abnormalities noted in diagram):   Head / Face Inspection Performed  Neck Inspection Performed  RUE Inspected  LUE Inspection Performed  Nails and Digits Inspection Performed            Diagram Legend     Open and closed comedones      Inflammatory papules and pustules               Assessment / Plan:        Acne vulgaris  Dyschromia  -     clindamycin phosphate 1% (CLINDAGEL) 1 % gel; Apply topically 2 (two) times daily.  Dispense: 60 g; Refill: 5  -     in setting of sensitive/dry skin. Discussed change to CeraVe hydrating cleanser and will start above med.            Follow up in about 3 months (around 5/28/2023).

## 2023-03-01 ENCOUNTER — PATIENT MESSAGE (OUTPATIENT)
Dept: INFECTIOUS DISEASES | Facility: CLINIC | Age: 23
End: 2023-03-01
Payer: COMMERCIAL

## 2023-03-02 ENCOUNTER — HOSPITAL ENCOUNTER (EMERGENCY)
Facility: HOSPITAL | Age: 23
Discharge: HOME | End: 2023-03-02
Attending: EMERGENCY MEDICINE
Payer: COMMERCIAL

## 2023-03-02 ENCOUNTER — TELEPHONE (OUTPATIENT)
Dept: FAMILY MEDICINE | Facility: CLINIC | Age: 23
End: 2023-03-02
Payer: COMMERCIAL

## 2023-03-02 VITALS
RESPIRATION RATE: 16 BRPM | WEIGHT: 194 LBS | HEART RATE: 68 BPM | OXYGEN SATURATION: 98 % | BODY MASS INDEX: 35.48 KG/M2 | TEMPERATURE: 99 F | DIASTOLIC BLOOD PRESSURE: 64 MMHG | SYSTOLIC BLOOD PRESSURE: 106 MMHG

## 2023-03-02 DIAGNOSIS — R10.84 GENERALIZED ABDOMINAL PAIN: Primary | ICD-10-CM

## 2023-03-02 DIAGNOSIS — R10.11 RUQ PAIN: Primary | ICD-10-CM

## 2023-03-02 LAB
ALBUMIN SERPL BCP-MCNC: 3.6 G/DL (ref 3.5–5.2)
ALP SERPL-CCNC: 50 U/L (ref 55–135)
ALT SERPL W/O P-5'-P-CCNC: 60 U/L (ref 10–44)
ANION GAP SERPL CALC-SCNC: 8 MMOL/L (ref 8–16)
AST SERPL-CCNC: 41 U/L (ref 10–40)
B-HCG UR QL: NEGATIVE
BACTERIA #/AREA URNS AUTO: NORMAL /HPF
BASOPHILS # BLD AUTO: 0.06 K/UL (ref 0–0.2)
BASOPHILS NFR BLD: 0.4 % (ref 0–1.9)
BILIRUB SERPL-MCNC: 0.4 MG/DL (ref 0.1–1)
BILIRUB UR QL STRIP: NEGATIVE
BUN SERPL-MCNC: 7 MG/DL (ref 6–20)
BUN SERPL-MCNC: 8 MG/DL (ref 6–30)
CALCIUM SERPL-MCNC: 8.9 MG/DL (ref 8.7–10.5)
CHLORIDE SERPL-SCNC: 102 MMOL/L (ref 95–110)
CHLORIDE SERPL-SCNC: 103 MMOL/L (ref 95–110)
CLARITY UR REFRACT.AUTO: ABNORMAL
CO2 SERPL-SCNC: 22 MMOL/L (ref 23–29)
COLOR UR AUTO: YELLOW
CREAT SERPL-MCNC: 0.5 MG/DL (ref 0.5–1.4)
CREAT SERPL-MCNC: 0.6 MG/DL (ref 0.5–1.4)
CTP QC/QA: YES
DIFFERENTIAL METHOD: ABNORMAL
EOSINOPHIL # BLD AUTO: 0 K/UL (ref 0–0.5)
EOSINOPHIL NFR BLD: 0.1 % (ref 0–8)
ERYTHROCYTE [DISTWIDTH] IN BLOOD BY AUTOMATED COUNT: 12.8 % (ref 11.5–14.5)
EST. GFR  (NO RACE VARIABLE): >60 ML/MIN/1.73 M^2
GLUCOSE SERPL-MCNC: 101 MG/DL (ref 70–110)
GLUCOSE SERPL-MCNC: 106 MG/DL (ref 70–110)
GLUCOSE UR QL STRIP: NEGATIVE
HCT VFR BLD AUTO: 42.6 % (ref 37–48.5)
HCT VFR BLD CALC: 43 %PCV (ref 36–54)
HGB BLD-MCNC: 14.3 G/DL (ref 12–16)
HGB UR QL STRIP: NEGATIVE
IMM GRANULOCYTES # BLD AUTO: 0.07 K/UL (ref 0–0.04)
IMM GRANULOCYTES NFR BLD AUTO: 0.5 % (ref 0–0.5)
KETONES UR QL STRIP: NEGATIVE
LEUKOCYTE ESTERASE UR QL STRIP: ABNORMAL
LIPASE SERPL-CCNC: 16 U/L (ref 4–60)
LYMPHOCYTES # BLD AUTO: 1.4 K/UL (ref 1–4.8)
LYMPHOCYTES NFR BLD: 9.6 % (ref 18–48)
MCH RBC QN AUTO: 31.5 PG (ref 27–31)
MCHC RBC AUTO-ENTMCNC: 33.6 G/DL (ref 32–36)
MCV RBC AUTO: 94 FL (ref 82–98)
MICROSCOPIC COMMENT: NORMAL
MONOCYTES # BLD AUTO: 0.9 K/UL (ref 0.3–1)
MONOCYTES NFR BLD: 6.1 % (ref 4–15)
NEUTROPHILS # BLD AUTO: 11.8 K/UL (ref 1.8–7.7)
NEUTROPHILS NFR BLD: 83.3 % (ref 38–73)
NITRITE UR QL STRIP: NEGATIVE
NRBC BLD-RTO: 0 /100 WBC
PH UR STRIP: 7 [PH] (ref 5–8)
PLATELET # BLD AUTO: 274 K/UL (ref 150–450)
PMV BLD AUTO: 12.9 FL (ref 9.2–12.9)
POC IONIZED CALCIUM: 1.02 MMOL/L (ref 1.06–1.42)
POC TCO2 (MEASURED): 25 MMOL/L (ref 23–29)
POTASSIUM BLD-SCNC: 4.7 MMOL/L (ref 3.5–5.1)
POTASSIUM SERPL-SCNC: 4 MMOL/L (ref 3.5–5.1)
PROT SERPL-MCNC: 6.6 G/DL (ref 6–8.4)
PROT UR QL STRIP: NEGATIVE
RBC # BLD AUTO: 4.54 M/UL (ref 4–5.4)
RBC #/AREA URNS AUTO: 1 /HPF (ref 0–4)
SAMPLE: ABNORMAL
SODIUM BLD-SCNC: 135 MMOL/L (ref 136–145)
SODIUM SERPL-SCNC: 133 MMOL/L (ref 136–145)
SP GR UR STRIP: 1.02 (ref 1–1.03)
SQUAMOUS #/AREA URNS AUTO: 16 /HPF
URN SPEC COLLECT METH UR: ABNORMAL
WBC # BLD AUTO: 14.22 K/UL (ref 3.9–12.7)
WBC #/AREA URNS AUTO: 5 /HPF (ref 0–5)

## 2023-03-02 PROCEDURE — 81025 URINE PREGNANCY TEST: CPT | Performed by: EMERGENCY MEDICINE

## 2023-03-02 PROCEDURE — 80048 BASIC METABOLIC PNL TOTAL CA: CPT | Mod: XB

## 2023-03-02 PROCEDURE — 99284 EMERGENCY DEPT VISIT MOD MDM: CPT | Mod: ,,, | Performed by: EMERGENCY MEDICINE

## 2023-03-02 PROCEDURE — 96374 THER/PROPH/DIAG INJ IV PUSH: CPT | Mod: 59

## 2023-03-02 PROCEDURE — 80053 COMPREHEN METABOLIC PANEL: CPT | Performed by: EMERGENCY MEDICINE

## 2023-03-02 PROCEDURE — 25500020 PHARM REV CODE 255: Performed by: EMERGENCY MEDICINE

## 2023-03-02 PROCEDURE — 81001 URINALYSIS AUTO W/SCOPE: CPT

## 2023-03-02 PROCEDURE — 99284 PR EMERGENCY DEPT VISIT,LEVEL IV: ICD-10-PCS | Mod: ,,, | Performed by: EMERGENCY MEDICINE

## 2023-03-02 PROCEDURE — 63600175 PHARM REV CODE 636 W HCPCS

## 2023-03-02 PROCEDURE — 85025 COMPLETE CBC W/AUTO DIFF WBC: CPT

## 2023-03-02 PROCEDURE — 99285 EMERGENCY DEPT VISIT HI MDM: CPT | Mod: 25

## 2023-03-02 PROCEDURE — 96375 TX/PRO/DX INJ NEW DRUG ADDON: CPT

## 2023-03-02 PROCEDURE — 83690 ASSAY OF LIPASE: CPT | Performed by: EMERGENCY MEDICINE

## 2023-03-02 RX ORDER — ONDANSETRON 2 MG/ML
4 INJECTION INTRAMUSCULAR; INTRAVENOUS
Status: COMPLETED | OUTPATIENT
Start: 2023-03-02 | End: 2023-03-02

## 2023-03-02 RX ORDER — MORPHINE SULFATE 4 MG/ML
4 INJECTION, SOLUTION INTRAMUSCULAR; INTRAVENOUS
Status: COMPLETED | OUTPATIENT
Start: 2023-03-02 | End: 2023-03-02

## 2023-03-02 RX ADMIN — ONDANSETRON 4 MG: 2 INJECTION INTRAMUSCULAR; INTRAVENOUS at 05:03

## 2023-03-02 RX ADMIN — MORPHINE SULFATE 4 MG: 4 INJECTION INTRAVENOUS at 05:03

## 2023-03-02 RX ADMIN — IOHEXOL 75 ML: 350 INJECTION, SOLUTION INTRAVENOUS at 05:03

## 2023-03-02 NOTE — ED PROVIDER NOTES
"Encounter Date: 3/2/2023       History     Chief Complaint   Patient presents with    Abdominal Pain     Abd pain and N/V x 2 hours.      22-year-old female with history of MS, seizure, factor 5 Leiden mutation is presenting to the emergency department for acute onset of abdominal pain.  Patient describes periumbilical pain that radiates bilaterally.  Associated nausea and 1 episode of vomiting.  States she had similar abdominal pain in January.  She was seen at outside emergency department and diagnosed with "dermoid cyst ".  States that abdominal pain now is much more severe however.  She denies any fevers, urinary symptoms.  Patient is not sexually active.  Her last menstrual period was February 14th and was regular.  She has not use any illicit drugs.  Patient reports intermittent, social alcohol use, nothing recently.    The history is provided by the patient. No  was used.   Review of patient's allergies indicates:  No Known Allergies  Past Medical History:   Diagnosis Date    Factor 5 Leiden mutation, heterozygous 9/13/2016    Seizures     1 at the age of 10     Past Surgical History:   Procedure Laterality Date    TONSILLECTOMY  6 years old     Family History   Problem Relation Age of Onset    Hypertension Mother     Irregular menses Mother     Factor V Leiden deficiency Mother     Interstitial cystitis Mother     No Known Problems Father     Factor V Leiden deficiency Sister     Interstitial cystitis Maternal Aunt     Factor V Leiden deficiency Maternal Grandmother     Thyroid disease Maternal Grandmother         hypothyroidism    Mitral valve prolapse Maternal Grandfather     Congenital heart disease Maternal Grandfather         valve    Crohn's disease Paternal Grandmother     Breast cancer Neg Hx     Colon cancer Neg Hx     Ovarian cancer Neg Hx     Arrhythmia Neg Hx     Early death Neg Hx     Heart attacks under age 50 Neg Hx     Pacemaker/defibrilator Neg Hx      Social History "     Tobacco Use    Smoking status: Never    Smokeless tobacco: Never   Substance Use Topics    Alcohol use: No    Drug use: No     Review of Systems   Constitutional:         See HPI     Physical Exam     Initial Vitals [03/02/23 0416]   BP Pulse Resp Temp SpO2   124/72 77 15 97.9 °F (36.6 °C) 95 %      MAP       --         Physical Exam    Nursing note and vitals reviewed.  Constitutional: She appears well-developed and well-nourished. She appears distressed.   Patient appears to be in pain.   HENT:   Head: Normocephalic and atraumatic.   Eyes: Conjunctivae and EOM are normal.   Neck: Neck supple.   Normal range of motion.  Cardiovascular:  Normal rate, regular rhythm, normal heart sounds and intact distal pulses.           Pulmonary/Chest: No respiratory distress. She has no wheezes. She has no rhonchi. She has no rales.   Abdominal: Abdomen is soft. Bowel sounds are normal. She exhibits no distension. There is abdominal tenderness.   Significant tenderness Tony suprapubic and periumbilical with guarding. There is rebound and guarding.   Musculoskeletal:         General: No tenderness or edema. Normal range of motion.      Cervical back: Normal range of motion and neck supple.     Neurological: She is alert and oriented to person, place, and time. She has normal strength.   Skin: Skin is warm and dry.   Psychiatric: She has a normal mood and affect. Thought content normal.       ED Course   Procedures  Labs Reviewed   CBC W/ AUTO DIFFERENTIAL - Abnormal; Notable for the following components:       Result Value    WBC 14.22 (*)     MCH 31.5 (*)     Gran # (ANC) 11.8 (*)     Immature Grans (Abs) 0.07 (*)     Gran % 83.3 (*)     Lymph % 9.6 (*)     All other components within normal limits   URINALYSIS, REFLEX TO URINE CULTURE - Abnormal; Notable for the following components:    Appearance, UA Hazy (*)     Leukocytes, UA 2+ (*)     All other components within normal limits    Narrative:     Specimen Source->Urine    COMPREHENSIVE METABOLIC PANEL - Abnormal; Notable for the following components:    Sodium 133 (*)     CO2 22 (*)     Alkaline Phosphatase 50 (*)     AST 41 (*)     ALT 60 (*)     All other components within normal limits   ISTAT PROCEDURE - Abnormal; Notable for the following components:    POC Sodium 135 (*)     POC Ionized Calcium 1.02 (*)     All other components within normal limits   URINALYSIS MICROSCOPIC    Narrative:     Specimen Source->Urine   LIPASE   POCT URINE PREGNANCY          Imaging Results              US Pelvis Complete Non OB (Final result)  Result time 03/02/23 08:19:58   Procedure changed from US Pelvis Comp with Transvag NON-OB (xpd     Final result by Сергей Calvin DO (03/02/23 08:19:58)                   Impression:      Left ovarian dermoid as above.      Electronically signed by: Сергей Calvin  Date:    03/02/2023  Time:    08:19               Narrative:    EXAMINATION:  US PELVIS COMPLETE NON OB    CLINICAL HISTORY:  dermoid cyst;    TECHNIQUE:  Transabdominal sonography of the pelvis was performed, followed by transvaginal sonography to better evaluate the uterus and ovaries.    COMPARISON:  CT abdomen and pelvis from 03/02/2023 taken at 05:07.    FINDINGS:  Uterus:    Size: 8.7 x 3.5 x 4.2 cm    Masses: None    Endometrium: Normal in this pre menopausal patient, measuring 4 mm.    Right ovary:    Size: 2.9 x 1.5 x 2.4 cm    Appearance: Normal    Vascular flow: Normal.    Left ovary:    Size: 2.7 x 0.9 x 1.9 cm    Appearance: There is a mixed hyperechoic/hypoechoic, fat containing lesion in the ovary measuring 3.8 x 2.5 x 3.6 cm, compatible with a mature ovarian teratoma/dermoid.    Vascular Flow: Normal.    Free Fluid:    None.                                       CT Abdomen Pelvis With Contrast (Final result)  Result time 03/02/23 06:03:14      Final result by Boris Rivera MD (03/02/23 06:03:14)                   Impression:      No compelling evidence of acute  intra-abdominal pathology.    Left ovarian dermoid cyst.    Probable cholelithiasis.    Further workup as warranted clinically.      Electronically signed by: Boris Rivera MD  Date:    03/02/2023  Time:    06:03               Narrative:    EXAMINATION:  CT ABDOMEN PELVIS WITH CONTRAST    CLINICAL HISTORY:  Abdominal pain, acute, nonlocalized;    TECHNIQUE:  Low dose axial CT images obtained throughout the region of the abdomen and pelvis following the administration 75 mL Omnipaque 350 intravenous contrast.  Axial, sagittal and coronal reconstructions were performed.    COMPARISON:  None    FINDINGS:  Lung bases are clear and the visualized portions of the heart and mediastinum are unremarkable.    Liver is unremarkable.  0.7 cm hyperattenuating focus in the dependent aspect of the gallbladder neck suggesting of small stone.  Bile ducts are not dilated.    Spleen, pancreas, and adrenal glands appear within normal limits.    Kidneys appear normal. The ureters are decompressed. Urinary bladder unremarkable.  2 cm fat density lesion associated with the left ovary.    The stomach, small bowel and colon demonstrate no evidence of obstruction or focal inflammation.  Appendix is unremarkable.    No free air or free fluid identified within the abdomen or pelvis.    Aorta tapers normally throughout its visualized course.    No fracture or focal osseous destructive lesion.                                       Medications   morphine injection 4 mg (4 mg Intravenous Given 3/2/23 0537)   ondansetron injection 4 mg (4 mg Intravenous Given 3/2/23 0537)   iohexoL (OMNIPAQUE 350) injection 75 mL (75 mLs Intravenous Given 3/2/23 0505)     Medical Decision Making:   Initial Assessment:   22-year-old female with history of MS, seizure, factor 5 Leiden mutation is presenting to the emergency department for acute onset of abdominal pain.  History of dermoid cyst and states pain feels similar.  She presents with normal vital signs.  No  urinary or vaginal symptoms.  Patient denies being sexually active.  Differential Diagnosis:   Ovarian torsion, ovarian cyst, appendicitis, gastritis, UTI, pyelonephritis  Clinical Tests:   Lab Tests: Ordered and Reviewed  Radiological Study: Ordered and Reviewed  Medical Tests: Ordered and Reviewed  ED Management:  CT with dermoid cyst.  Ultrasound pending at the time of sign-out to evaluate for possible torsion.  Patient is feeling more improved after medications.  Final disposition per remaining workup.           ED Course as of 03/03/23 0635   Thu Mar 02, 2023   0536 Preg Test, Ur: Negative [KL]   0536 WBC(!): 14.22  Leukocytosis. [KL]   0607 CT shows No compelling evidence of acute intra-abdominal pathology.   Left ovarian dermoid cyst.  Probable cholelithiasis.  Further workup as warranted clinically. [KL]   0611 On re-evaluation patient is feeling improved.  CT with dermoid cyst.  Will obtain ultrasound to ensure blood flow. [KL]   0640 CMP hemolyzed.  Labs are re-sent. [KL]      ED Course User Index  [KL] Kayy Franco MD                 Clinical Impression:   Final diagnoses:  [R10.84] Generalized abdominal pain (Primary)        ED Disposition Condition    Discharge Stable          ED Prescriptions    None       Follow-up Information       Follow up With Specialties Details Why Contact Info    Chris Rendon MD Family Medicine In 3 days As needed [address] [facility]  [place] [id number] [phone]      Your ob/gyn  In 5 days follow up with a specialist, for re-evaluation of your symptoms              Kayy Franco MD  Resident  03/03/23 0637

## 2023-03-02 NOTE — ED PROVIDER NOTES
7:29 AM 3/2/2023  I have assumed care for this patient from  EZEKIEL Mendieta.  I discussed care with the previous attending. I agree with the plan as previously determined.  Briefly 21 y/o F with history of Factor 5 Leiden mutation, seizure in process of w/o for MS as well as known dermoid cyst - ovary presents with abdominal pain. Blood work with mild leukocytosis and transaminitis. Normal lipase.  CT abd radiology impression: No compelling evidence of acute intra-abdominal pathology. Left ovarian dermoid cyst. of note 0.7 cm hyperattenuating focus in the dependent aspect of the gallbladder neck suggesting of small stone.  Bile ducts are not dilated.     I assumed care of patient from off-going ED physician team at 7:29 AM pending pelvic ultrasound. Pt re-evaluated at this time and is pain free.    8:54 AM   Pelvic US with mixed hyperechoic/hypoechoic, fat containing lesion in the ovary measuring 3.8 x 2.5 x 3.6 cm, compatible with a mature ovarian teratoma/dermoid normal flow. No free fluid.     Discussed results with pt and mother. Pt has follow up with OB/gyn- re dermoid cyst  Informed of likely gallstones and transaminitis.  Mild leukocytosis possible early cholecystitis but pt is afebrile and pain free.  Pt and mother comfortable with discharge home. Will follow up with PCP for out pt RUQ US. Routine return precautions provided.               Dahlia Heredia MD  03/02/23 0993

## 2023-03-02 NOTE — TELEPHONE ENCOUNTER
Patient in clinic to be seen due to her leaving the [facility] emergency room for complaint of abdominal pain. Patient and mother in the room and patient's mother informed me that they were at the emergency room since 8 Am and test was ran on patient  and after several hours there they was told that patient test came back with no acute finding and was discharge to go home. Patient mother said that they were almost home when they receive a call from the hospital and was told that they see possible gallstone and will need patient to have her provider order the Patient an Ultra sound of the abdominal area for gallstones. Patient mother said so they schedule an office visit today and was now informed that since she was seen in the emergency room today that the insurance may not pay for the visit and this may be an out of pocket charge. Mother is very upset and state she will not be paying for a visit out of pocket because she was informed by the hospital that she will need to see her provider to get this order and that is what she is doing. Spoke with patient's PCP Dr Rendon and he review chart and ordered Stat Ultrasound. Ultrasound department was contacted at the [facility] to schedule stat Ultrasound. [facility] had eaten a banana a few minutes ago. She informed me that patient will need to be fasting for the test. The next available slot want be until 3/7/2023. She can work patient in today but test may not be accurate. Patient and mother was inform of this. Patient and mother opt to have Ultra Sound of the Abdominal done today.

## 2023-03-02 NOTE — ED NOTES
Zeny Charles, a 22 y.o. female presents to the ED w/ complaint of abd pain and NVD x2 hours. Reports taking ibuprofen with no relief. Denies fever, chills, or sore throat    Triage note:  Chief Complaint   Patient presents with    Abdominal Pain     Abd pain and N/V x 2 hours.      Review of patient's allergies indicates:  No Known Allergies  Past Medical History:   Diagnosis Date    Factor 5 Leiden mutation, heterozygous 9/13/2016    Seizures     1 at the age of 10    Patient identifiers for Zeny Charles checked and correct.    LOC: The patient is awake, alert and aware of environment with an appropriate affect, the patient is oriented x 4 and speaking appropriately.  APPEARANCE: Patient resting comfortably and in no acute distress, patient is clean and well groomed, patient's clothing is properly fastened.  SKIN: The skin is warm and dry, color consistent with ethnicity, patient has normal skin turgor and moist mucus membranes, skin intact, no breakdown or bruising noted.  MUSCULOSKELETAL: Patient moving all extremities well, no obvious swelling or deformities noted.  RESPIRATORY: Airway is open and patent, respirations are spontaneous and even, patient has a normal effort and rate.  CARDIAC: Patient has a normal rate and rhythm, no periphreal edema noted, capillary refill < 3 seconds. Normal +2 pedal pulses present.  ABDOMEN: Soft and non tender to palpation, no distention noted. Patient denies any constipation. +NVD, abd pain  NEUROLOGIC: Eyes open spontaneously, PERRL, behavior appropriate to situation, follows commands, facial expression symmetrical, bilateral hand grasp equal and even, purposeful motor response noted, normal sensation in all extremities.

## 2023-03-02 NOTE — Clinical Note
Dev Charles accompanied their child to the emergency department on 3/2/2023. They may return to work on 03/03/2023.      If you have any questions or concerns, please don't hesitate to call.      DR DORIE Ferro/ VANESSA Zamarripa

## 2023-03-02 NOTE — ED NOTES
I-STAT Chem-8+ Results:   Value Reference Range   Sodium 135 136-145 mmol/L   Potassium  4.7 3.5-5.1 mmol/L   Chloride 102  mmol/L   Ionized Calcium 1.02 1.06-1.42 mmol/L   CO2 (measured) 25 23-29 mmol/L   Glucose 106  mg/dL   BUN 8 6-30 mg/dL   Creatinine 0.5 0.5-1.4 mg/dL   Hematocrit 43 36-54%

## 2023-03-02 NOTE — DISCHARGE INSTRUCTIONS
If you are having worsening pain, you can take 600mg Ibuprofen every 6 hours. If you have pain despite taking iburpofen, you can alternate 650mg Tylenol with it every 6 hours (so take Ibuprofen at 8am, then Tylenol at 11am, Ibuprofen at 2pm...)

## 2023-03-03 ENCOUNTER — OFFICE VISIT (OUTPATIENT)
Dept: FAMILY MEDICINE | Facility: CLINIC | Age: 23
End: 2023-03-03
Payer: COMMERCIAL

## 2023-03-03 ENCOUNTER — PATIENT MESSAGE (OUTPATIENT)
Dept: FAMILY MEDICINE | Facility: CLINIC | Age: 23
End: 2023-03-03

## 2023-03-03 VITALS
OXYGEN SATURATION: 97 % | SYSTOLIC BLOOD PRESSURE: 116 MMHG | HEIGHT: 62 IN | WEIGHT: 195 LBS | DIASTOLIC BLOOD PRESSURE: 70 MMHG | BODY MASS INDEX: 35.88 KG/M2 | HEART RATE: 103 BPM | TEMPERATURE: 98 F

## 2023-03-03 DIAGNOSIS — K80.20 CALCULUS OF GALLBLADDER WITHOUT CHOLECYSTITIS WITHOUT OBSTRUCTION: Primary | ICD-10-CM

## 2023-03-03 DIAGNOSIS — E66.01 SEVERE OBESITY (BMI 35.0-39.9) WITH COMORBIDITY: ICD-10-CM

## 2023-03-03 DIAGNOSIS — G37.9 DEMYELINATING DISEASE: ICD-10-CM

## 2023-03-03 DIAGNOSIS — R74.01 TRANSAMINITIS: ICD-10-CM

## 2023-03-03 DIAGNOSIS — D68.51 FACTOR 5 LEIDEN MUTATION, HETEROZYGOUS: ICD-10-CM

## 2023-03-03 DIAGNOSIS — E66.09 CLASS 2 OBESITY DUE TO EXCESS CALORIES WITHOUT SERIOUS COMORBIDITY WITH BODY MASS INDEX (BMI) OF 37.0 TO 37.9 IN ADULT: ICD-10-CM

## 2023-03-03 PROCEDURE — 99999 PR PBB SHADOW E&M-EST. PATIENT-LVL V: CPT | Mod: PBBFAC,,, | Performed by: NURSE PRACTITIONER

## 2023-03-03 PROCEDURE — 1159F MED LIST DOCD IN RCRD: CPT | Mod: CPTII,S$GLB,, | Performed by: NURSE PRACTITIONER

## 2023-03-03 PROCEDURE — 1111F PR DISCHARGE MEDS RECONCILED W/ CURRENT OUTPATIENT MED LIST: ICD-10-PCS | Mod: CPTII,S$GLB,, | Performed by: NURSE PRACTITIONER

## 2023-03-03 PROCEDURE — 99214 OFFICE O/P EST MOD 30 MIN: CPT | Mod: S$GLB,,, | Performed by: NURSE PRACTITIONER

## 2023-03-03 PROCEDURE — 1160F RVW MEDS BY RX/DR IN RCRD: CPT | Mod: CPTII,S$GLB,, | Performed by: NURSE PRACTITIONER

## 2023-03-03 PROCEDURE — 3078F PR MOST RECENT DIASTOLIC BLOOD PRESSURE < 80 MM HG: ICD-10-PCS | Mod: CPTII,S$GLB,, | Performed by: NURSE PRACTITIONER

## 2023-03-03 PROCEDURE — 3074F PR MOST RECENT SYSTOLIC BLOOD PRESSURE < 130 MM HG: ICD-10-PCS | Mod: CPTII,S$GLB,, | Performed by: NURSE PRACTITIONER

## 2023-03-03 PROCEDURE — 1159F PR MEDICATION LIST DOCUMENTED IN MEDICAL RECORD: ICD-10-PCS | Mod: CPTII,S$GLB,, | Performed by: NURSE PRACTITIONER

## 2023-03-03 PROCEDURE — 3008F BODY MASS INDEX DOCD: CPT | Mod: CPTII,S$GLB,, | Performed by: NURSE PRACTITIONER

## 2023-03-03 PROCEDURE — 99214 PR OFFICE/OUTPT VISIT, EST, LEVL IV, 30-39 MIN: ICD-10-PCS | Mod: S$GLB,,, | Performed by: NURSE PRACTITIONER

## 2023-03-03 PROCEDURE — 1111F DSCHRG MED/CURRENT MED MERGE: CPT | Mod: CPTII,S$GLB,, | Performed by: NURSE PRACTITIONER

## 2023-03-03 PROCEDURE — 3008F PR BODY MASS INDEX (BMI) DOCUMENTED: ICD-10-PCS | Mod: CPTII,S$GLB,, | Performed by: NURSE PRACTITIONER

## 2023-03-03 PROCEDURE — 1160F PR REVIEW ALL MEDS BY PRESCRIBER/CLIN PHARMACIST DOCUMENTED: ICD-10-PCS | Mod: CPTII,S$GLB,, | Performed by: NURSE PRACTITIONER

## 2023-03-03 PROCEDURE — 99999 PR PBB SHADOW E&M-EST. PATIENT-LVL V: ICD-10-PCS | Mod: PBBFAC,,, | Performed by: NURSE PRACTITIONER

## 2023-03-03 PROCEDURE — 3074F SYST BP LT 130 MM HG: CPT | Mod: CPTII,S$GLB,, | Performed by: NURSE PRACTITIONER

## 2023-03-03 PROCEDURE — 3078F DIAST BP <80 MM HG: CPT | Mod: CPTII,S$GLB,, | Performed by: NURSE PRACTITIONER

## 2023-03-03 NOTE — PROGRESS NOTES
History of Present Illness   Zeny Charles is a 22 y.o. woman with medical history as listed below who presents today for ER Follow-up, abdominal pain. She presented to the ER with abdominal pain RUQ, nausea, and vomiting. CT showed probable cholelithiasis. She had US yesterday which confirmed cholelithiasis. Her pain, nausea, and vomiting has since subsided. She is interested in seeing general surgery to discuss gallbladder removal. She has no additional complaints and is otherwise healthy on today's visit.    Past Medical History:   Diagnosis Date    Factor 5 Leiden mutation, heterozygous 9/13/2016    Seizures     1 at the age of 10       Past Surgical History:   Procedure Laterality Date    TONSILLECTOMY  6 years old       Social History     Socioeconomic History    Marital status: Single   Tobacco Use    Smoking status: Never    Smokeless tobacco: Never   Substance and Sexual Activity    Alcohol use: No    Drug use: No    Sexual activity: Never   Social History Narrative    In 12th grade. Lives with Mom and Dad. No smokers. No alcohol, tobacco, illicit drugs. 1 dog.        Family History   Problem Relation Age of Onset    Hypertension Mother     Irregular menses Mother     Factor V Leiden deficiency Mother     Interstitial cystitis Mother     No Known Problems Father     Factor V Leiden deficiency Sister     Interstitial cystitis Maternal Aunt     Factor V Leiden deficiency Maternal Grandmother     Thyroid disease Maternal Grandmother         hypothyroidism    Mitral valve prolapse Maternal Grandfather     Congenital heart disease Maternal Grandfather         valve    Crohn's disease Paternal Grandmother     Breast cancer Neg Hx     Colon cancer Neg Hx     Ovarian cancer Neg Hx     Arrhythmia Neg Hx     Early death Neg Hx     Heart attacks under age 50 Neg Hx     Pacemaker/defibrilator Neg Hx        Review of Systems  Review of Systems   Constitutional:  Negative for chills and fever.   Gastrointestinal:  Negative  "for abdominal pain, blood in stool, constipation, diarrhea, heartburn, melena, nausea and vomiting.     A complete review of systems was otherwise negative.    Physical Exam  /70   Pulse 103   Temp 97.9 °F (36.6 °C)   Ht 5' 2" (1.575 m)   Wt 88.5 kg (195 lb)   LMP 02/14/2023 (Approximate)   SpO2 97%   BMI 35.67 kg/m²   General appearance: alert, appears stated age, cooperative, and no distress  Lungs: clear to auscultation bilaterally  Heart: regular rate and rhythm, S1, S2 normal, no murmur, click, rub or gallop  Abdomen: soft, non-tender; bowel sounds normal; no masses,  no organomegaly  Neurologic: Grossly normal    Assessment/Plan  Calculus of gallbladder without cholecystitis without obstruction  Referral to general surgery to discuss cholecystectomy.  Handout provided on foods to avoid.  ER Precautions discussed.  RTC PRN.  -     Ambulatory referral/consult to General Surgery; Future; Expected date: 03/10/2023    Transaminitis  Referral to general surgery to discuss cholecystectomy.  Handout provided on foods to avoid.  ER Precautions discussed.  RTC PRN.  -     Ambulatory referral/consult to General Surgery; Future; Expected date: 03/10/2023    Demyelinating disease  Current work-up for MS in process, appointment with neurology in one week.    Class 2 obesity due to excess calories without serious comorbidity with body mass index (BMI) of 37.0 to 37.9 in adult  The patient is asked to make an attempt to improve diet and exercise patterns to aid in medical management of this problem.    Severe obesity (BMI 35.0-39.9) with comorbidity  The patient is asked to make an attempt to improve diet and exercise patterns to aid in medical management of this problem.    Factor 5 Leiden mutation, heterozygous  The current medical regimen is effective;  continue present plan and medications.    Patient has verbalized understanding and is in agreement with plan of care.    Follow up if symptoms worsen or fail " to improve.

## 2023-03-07 ENCOUNTER — OFFICE VISIT (OUTPATIENT)
Dept: NEUROLOGY | Facility: CLINIC | Age: 23
End: 2023-03-07
Payer: COMMERCIAL

## 2023-03-07 ENCOUNTER — LAB VISIT (OUTPATIENT)
Dept: LAB | Facility: OTHER | Age: 23
End: 2023-03-07
Attending: STUDENT IN AN ORGANIZED HEALTH CARE EDUCATION/TRAINING PROGRAM
Payer: COMMERCIAL

## 2023-03-07 VITALS
HEART RATE: 88 BPM | BODY MASS INDEX: 34.78 KG/M2 | SYSTOLIC BLOOD PRESSURE: 117 MMHG | HEIGHT: 62 IN | DIASTOLIC BLOOD PRESSURE: 71 MMHG | WEIGHT: 189 LBS

## 2023-03-07 DIAGNOSIS — E55.9 VITAMIN D DEFICIENCY: ICD-10-CM

## 2023-03-07 DIAGNOSIS — R20.8 VIBRATION SENSORY LOSS: ICD-10-CM

## 2023-03-07 DIAGNOSIS — G37.9 CNS DEMYELINATION: Primary | ICD-10-CM

## 2023-03-07 DIAGNOSIS — G37.9 DEMYELINATING DISEASE: ICD-10-CM

## 2023-03-07 DIAGNOSIS — R20.0 HAND NUMBNESS: ICD-10-CM

## 2023-03-07 PROBLEM — R20.2 PARESTHESIA: Status: RESOLVED | Noted: 2023-02-08 | Resolved: 2023-03-07

## 2023-03-07 PROBLEM — Z74.09 IMPAIRED FUNCTIONAL MOBILITY, BALANCE, AND ENDURANCE: Status: RESOLVED | Noted: 2022-12-13 | Resolved: 2023-03-07

## 2023-03-07 PROBLEM — R68.89 DECREASED STRENGTH, ENDURANCE, AND MOBILITY: Status: RESOLVED | Noted: 2022-12-13 | Resolved: 2023-03-07

## 2023-03-07 PROBLEM — R53.1 DECREASED STRENGTH, ENDURANCE, AND MOBILITY: Status: RESOLVED | Noted: 2022-12-13 | Resolved: 2023-03-07

## 2023-03-07 PROBLEM — Z74.09 DECREASED STRENGTH, ENDURANCE, AND MOBILITY: Status: RESOLVED | Noted: 2022-12-13 | Resolved: 2023-03-07

## 2023-03-07 LAB
BLOOD COLLECTION FOR MS PROFILE: NORMAL
C3 SERPL-MCNC: 153 MG/DL (ref 50–180)
C4 SERPL-MCNC: 29 MG/DL (ref 11–44)
VIT B12 SERPL-MCNC: 499 PG/ML (ref 210–950)

## 2023-03-07 PROCEDURE — 84165 PROTEIN E-PHORESIS SERUM: CPT | Performed by: STUDENT IN AN ORGANIZED HEALTH CARE EDUCATION/TRAINING PROGRAM

## 2023-03-07 PROCEDURE — 86363 MOG-IGG1 ANTB FLO CYTMTRY EA: CPT | Performed by: STUDENT IN AN ORGANIZED HEALTH CARE EDUCATION/TRAINING PROGRAM

## 2023-03-07 PROCEDURE — 99417 PR PROLONGED SVC, OUTPT, W/WO DIRECT PT CONTACT,  EA ADDTL 15 MIN: ICD-10-PCS | Mod: S$GLB,,, | Performed by: STUDENT IN AN ORGANIZED HEALTH CARE EDUCATION/TRAINING PROGRAM

## 2023-03-07 PROCEDURE — 99417 PROLNG OP E/M EACH 15 MIN: CPT | Mod: S$GLB,,, | Performed by: STUDENT IN AN ORGANIZED HEALTH CARE EDUCATION/TRAINING PROGRAM

## 2023-03-07 PROCEDURE — 82607 VITAMIN B-12: CPT | Performed by: STUDENT IN AN ORGANIZED HEALTH CARE EDUCATION/TRAINING PROGRAM

## 2023-03-07 PROCEDURE — 3078F PR MOST RECENT DIASTOLIC BLOOD PRESSURE < 80 MM HG: ICD-10-PCS | Mod: CPTII,S$GLB,, | Performed by: STUDENT IN AN ORGANIZED HEALTH CARE EDUCATION/TRAINING PROGRAM

## 2023-03-07 PROCEDURE — 3008F PR BODY MASS INDEX (BMI) DOCUMENTED: ICD-10-PCS | Mod: CPTII,S$GLB,, | Performed by: STUDENT IN AN ORGANIZED HEALTH CARE EDUCATION/TRAINING PROGRAM

## 2023-03-07 PROCEDURE — 86334 PATHOLOGIST INTERPRETATION IFE: ICD-10-PCS | Mod: 26,,, | Performed by: PATHOLOGY

## 2023-03-07 PROCEDURE — 86334 IMMUNOFIX E-PHORESIS SERUM: CPT | Performed by: STUDENT IN AN ORGANIZED HEALTH CARE EDUCATION/TRAINING PROGRAM

## 2023-03-07 PROCEDURE — 1111F PR DISCHARGE MEDS RECONCILED W/ CURRENT OUTPATIENT MED LIST: ICD-10-PCS | Mod: CPTII,S$GLB,, | Performed by: STUDENT IN AN ORGANIZED HEALTH CARE EDUCATION/TRAINING PROGRAM

## 2023-03-07 PROCEDURE — 3074F SYST BP LT 130 MM HG: CPT | Mod: CPTII,S$GLB,, | Performed by: STUDENT IN AN ORGANIZED HEALTH CARE EDUCATION/TRAINING PROGRAM

## 2023-03-07 PROCEDURE — 84630 ASSAY OF ZINC: CPT | Performed by: STUDENT IN AN ORGANIZED HEALTH CARE EDUCATION/TRAINING PROGRAM

## 2023-03-07 PROCEDURE — 84165 PATHOLOGIST INTERPRETATION SPE: ICD-10-PCS | Mod: 26,,, | Performed by: PATHOLOGY

## 2023-03-07 PROCEDURE — 99999 PR PBB SHADOW E&M-EST. PATIENT-LVL III: CPT | Mod: PBBFAC,,, | Performed by: STUDENT IN AN ORGANIZED HEALTH CARE EDUCATION/TRAINING PROGRAM

## 2023-03-07 PROCEDURE — 99215 PR OFFICE/OUTPT VISIT, EST, LEVL V, 40-54 MIN: ICD-10-PCS | Mod: S$GLB,,, | Performed by: STUDENT IN AN ORGANIZED HEALTH CARE EDUCATION/TRAINING PROGRAM

## 2023-03-07 PROCEDURE — 82164 ANGIOTENSIN I ENZYME TEST: CPT | Performed by: STUDENT IN AN ORGANIZED HEALTH CARE EDUCATION/TRAINING PROGRAM

## 2023-03-07 PROCEDURE — 3078F DIAST BP <80 MM HG: CPT | Mod: CPTII,S$GLB,, | Performed by: STUDENT IN AN ORGANIZED HEALTH CARE EDUCATION/TRAINING PROGRAM

## 2023-03-07 PROCEDURE — 1159F MED LIST DOCD IN RCRD: CPT | Mod: CPTII,S$GLB,, | Performed by: STUDENT IN AN ORGANIZED HEALTH CARE EDUCATION/TRAINING PROGRAM

## 2023-03-07 PROCEDURE — 99999 PR PBB SHADOW E&M-EST. PATIENT-LVL III: ICD-10-PCS | Mod: PBBFAC,,, | Performed by: STUDENT IN AN ORGANIZED HEALTH CARE EDUCATION/TRAINING PROGRAM

## 2023-03-07 PROCEDURE — 3074F PR MOST RECENT SYSTOLIC BLOOD PRESSURE < 130 MM HG: ICD-10-PCS | Mod: CPTII,S$GLB,, | Performed by: STUDENT IN AN ORGANIZED HEALTH CARE EDUCATION/TRAINING PROGRAM

## 2023-03-07 PROCEDURE — 1159F PR MEDICATION LIST DOCUMENTED IN MEDICAL RECORD: ICD-10-PCS | Mod: CPTII,S$GLB,, | Performed by: STUDENT IN AN ORGANIZED HEALTH CARE EDUCATION/TRAINING PROGRAM

## 2023-03-07 PROCEDURE — 3008F BODY MASS INDEX DOCD: CPT | Mod: CPTII,S$GLB,, | Performed by: STUDENT IN AN ORGANIZED HEALTH CARE EDUCATION/TRAINING PROGRAM

## 2023-03-07 PROCEDURE — 86160 COMPLEMENT ANTIGEN: CPT | Mod: 59 | Performed by: STUDENT IN AN ORGANIZED HEALTH CARE EDUCATION/TRAINING PROGRAM

## 2023-03-07 PROCEDURE — 86334 IMMUNOFIX E-PHORESIS SERUM: CPT | Mod: 26,,, | Performed by: PATHOLOGY

## 2023-03-07 PROCEDURE — 1111F DSCHRG MED/CURRENT MED MERGE: CPT | Mod: CPTII,S$GLB,, | Performed by: STUDENT IN AN ORGANIZED HEALTH CARE EDUCATION/TRAINING PROGRAM

## 2023-03-07 PROCEDURE — 84165 PROTEIN E-PHORESIS SERUM: CPT | Mod: 26,,, | Performed by: PATHOLOGY

## 2023-03-07 PROCEDURE — 86790 VIRUS ANTIBODY NOS: CPT | Performed by: STUDENT IN AN ORGANIZED HEALTH CARE EDUCATION/TRAINING PROGRAM

## 2023-03-07 PROCEDURE — 84446 ASSAY OF VITAMIN E: CPT | Performed by: STUDENT IN AN ORGANIZED HEALTH CARE EDUCATION/TRAINING PROGRAM

## 2023-03-07 PROCEDURE — 86160 COMPLEMENT ANTIGEN: CPT | Performed by: STUDENT IN AN ORGANIZED HEALTH CARE EDUCATION/TRAINING PROGRAM

## 2023-03-07 PROCEDURE — 99215 OFFICE O/P EST HI 40 MIN: CPT | Mod: S$GLB,,, | Performed by: STUDENT IN AN ORGANIZED HEALTH CARE EDUCATION/TRAINING PROGRAM

## 2023-03-07 NOTE — PROGRESS NOTES
Patient ID: [id number]  Referring Physician: No ref. provider found    Chief Complaint/Reason for Consult: CNS demyelination      Subjective:     HPI  Zeny Charles is a 22 y.o. RH female with unspecified childhood seizure disorder, factor 5 Leiden, obesity, ADHD, and recent neurologic event. she is presenting today as a referral for evaluation of CNS demyelination. she is accompanied by her mother and sister.     Symptom History:  She has spent several months (June - Sep, then twice more in Oct and Dec 2022) in [place] when she swam in [place] and spent time in [place]. She developed URI (unspecified, might've been COVID but never confirmed) and a rash (erythematous maculopapular, mildly pruritic and some were raised) during her stay. After this event she noted numbness in bilateral feet in September.  Symptoms gradually progressed to more proximal bilateral lower extremities by November, however, numbness in feet had resolved by then. She was evaluated by Dr. Rendon in neurology, sent for MRI of lumbar spine with suspicion for meralgia paresthetica. MRI showed mild disc disease at L4-L5 but no resulting canal stenosis or foraminal narrowing.     By January 2023, numbness involved her torso up to the waist and bilateral hands. She also reports having been off balance and stumbling a lot. She was evaluated by Dr. Rendon again and underwent EMG-NCS which ruled out ulnar neuropathy and CTS. MRI C spine wo revealed T2 lesion and mild cord edema at C3-C4 and additional patchy areas throughout C4-T1. She was then admitted to [facility] [place] work up demyelinating disorders. Repeat MRI of C-Spine w/wo contrast showed subtle areas of enhancement. MRI of brain showed multiple foci of T2/FLAIR hyperintense signal within the periventricular areas involving corpus callosum concerning for demyelinating disorder such as MS.  At least 2-3 of these lesions were contrast-enhancing. Neurology was consulted inpatient and  started IVMP 1 gr x5 days. She reports some improvement in balance afetr the steroids, she still has residual numbness and incoordination in the left hand.    They have dogs with ticks on them but she never noted any tick bites. She has been evaluated by Dr. Méndez in infectious disease who ruled out vector borne and parasitic infections.    MS ROS:   Fatigue: No   Sleep Disturbance:   Bladder Dysfunction: No   Bowel Dysfunction: No   Spasticity: No   Lhermitte's Sign: No   Visual Symptoms: No   Cognitive:   Mood Disorder:   Gait Disturbance: No   Falls: No but tripping/stumbling, improved with steroids  Hand Dysfunction: Yes - difficulty with left hand, drops objects, can not do fine motor tasks   Pain: No   Tremors: No   Dysphagia: No   Dysarthria: No   Heat sensitivity: No   Exercise:  Diet:      Past Medical History:  Past Medical History:   Diagnosis Date    Factor 5 Leiden mutation, heterozygous 9/13/2016    Seizures     1 at the age of 10     Allergies:  Review of patient's allergies indicates:  No Known Allergies    Pertinent Family History:  No history of multiple sclerosis or other disease of the brain, spinal cord, or nerves.     Pertinent Social History:  From [place]. She is nursing school.     Medications:  Current Outpatient Medications   Medication Instructions    acetaminophen (TYLENOL) 500 mg, Oral, Every 8 hours PRN    clindamycin phosphate 1% (CLINDAGEL) 1 % gel Topical (Top), 2 times daily    dextroamphetamine-amphetamine 10 mg Tab 10 mg, Oral, 2 times daily    ibuprofen (ADVIL,MOTRIN) 400 mg, Oral, Every 4 hours    MOUNJARO 2.5 mg, Subcutaneous, Every 7 days    VITAMIN D3 2,000 Units, Oral, Daily        Disease Modifying Therapy:   N/A    Vaccination status:  -    Objective:     Vitals:    03/07/23 0922   BP: 117/71   Pulse: 88      General:  Well-appearing, well-nourished, NAD, cooperative    Neurologic Exam:   Awake, alert and oriented x3  Speech spontaneous and fluent, intact  comprehension.  Adequate fund of knowledge, vocabulary.    CN II - CN XII:  PERRLA. EOM intact. No Nystagmus. No ophthalmoplegia.   Visual fields are full to confrontation.   Facial sensation is normal to light touch.   Facial expression is full and symmetric.   Hearing is intact bilaterally.   Palate elevates symmetrically.   SCM and Trapezius full strength bilaterally.   Tongue is midline.     Motor:  Normal bulk and tone in all four limbs.   There are no atrophy or fasciculations. No tremor.     Shoulder  Abd Shoulder Add Elbow   Flex Elbow  Ext Wrist   Flex Wrist  Ext Finger  Flex Finger  Ext Finger  Abd Finger   Add IO Opposition   Right 5 5 5 5 5 5 5 5 5 5 5 5   Left 5 5 5 5 5 5 5 5 5 5 5 5      Hip  Flex Hip  Ext Thigh   Abd Thigh  Add Knee  Flex Knee  Ext Plantar  Flex Dorsiflex   Right 5 5   5 5 5 5   Left 5 5   5 5 5 5     Sensory:  Light touch: intact throughout.  Temperature: intact throughout.  Pinprick:   Vibration:  Decreased distally x4  Proprioception: impaired figure writing in LUE. position intact in BUE   Romberg is negative.    DTRs:   Biceps Brachioradialis Triceps Jerry Patellar Ankle Plantar   Right 3+ 3+  + 2+ 2+ mute   Left 3+ 3+  + 2+ 2+ mute     Coordination:  Finger to nose with end point tremor in LUE  impaired fine finger movements and rapid alternating movements in LUE    Gait:  Normal casual, heel, toe, and tandem gait.      EDSS: 2 --> Sensory FS 2, pyramidal FS 1 (vision was not tested)    Pertinent lab results    02/17/2023   T Cruzi Ab -  Cysticercosis IgG -  Strongyloides IgG -  Lyme antibodies -  Zika virus PCR -    02/09/2023   RPR -  SPEP Normal total protein, normal pattern  Vitamin B6 5  Thiamine 83  CBC/Diff -  CMP nl except CO2 19, glucose 177    02/03/2023  Zinc 71  Vitamin-D 19  B12 373   SSA and SSB Ab -  ESR 35  RF -  HIV 1/2 -  Hepatitis-C RNA andAb -  HBsAb -, HBcAb -  Heavy metal screen -  Copper 1140, ceruloplasmin nl  YOLA -    01/24/2023   Anti CCP IgG -  RF  -  CRP 8.6  Aldolase nl  Myoglobin nl  CK 46    09/22/2022  TSH 1.933, free T4 nl  Hemoglobin A1c 4.8  QuantiFERON gold TB -    Pertinent imaging results    * images personally reviewed and interpreted    02/07/2023  MRI Brain w/wo contrast:  Multiple foci of T2/FLAIR hyperintense signal throughout bilateral supratentorial white matter with ovoid morphology located within the periventricular area involving corpus callosum as well as few juxtacortical and infratentorial lesions. One focus of more confluent lesions adjacent to the posterior horn of the right ventricle. At least 2 focus of contrast enhancement.  At least 2-3 hypointense foci on T1 sequences.              MRI Cervical Spine w/wo contrast:  Several foci of T2/STIR hyperintense signal throughout the cervical cord at least 2 lesions spanning along 2 consecutive vertebral segments. 2-3 foci of subtle enhancement associated with these lesions.      02/01/2023  MRI Cervical Spine wo contrast:      12/01/2022  MRI Lumbar Spine w/wo contrast:  Degenerative disc disease L4-5.  Mild disc bulge and subtle posterior central annular fissure noted, no resulting canal stenosis or foraminal narrowing.    Other pertinent studies    02/03/2023  EMG-NCS: normal study of upper extremities    Assessment:   Zeny Charles is a 22 y.o. right-handed female with several comorbidities including factor 5 Leiden and obesity who presents for evaluation of recent demyelinating event. The clinical history is typical for a demyelinating relapse (most likely MS), with corresponding objective findings on MRIs. Presence of both enhancing and nonenhancing lesions is supportive of a multiphasic process and less likely to be triggered by recent upper respiratory infection. There are a few hypointense foci on T1 sequences which could be evolving black holes which is more supportive of the fact that this process had been going on before her [place] trip and the URI. However, I would like to  obtain CSF to confidently rule out any CNS infections that could be contributing to this picture. I will reach out to Dr. Méndez to see if there are any specific labs that he would like to add to CFS. I will add a few more serum tests to rule out MS mimics that wasn't investigated previously. We will reconvene with the results to confrim the final diagnosis and the next steps.     1. CNS demyelination    2. Vibration sensory loss    3. Hand numbness    4. Vitamin D deficiency      Plan:     Diagnosis and surveillance: CNS demyelinating disorder  Diagnostic Labs:   CSF studies including cell count and diferential, glucose, protein, oligoclonal bands, IgG index, ACE, VDRL  Serum mimics: B12, Zinc, Vitamin E, SPEP/ANGE, ACE, C3 and C4, HTLV I/II, CNS demyelinating panel    Disease Modifying Therapies:   None for now    Symptom Management/Life style modifications  Increase vitamin D supplementation to 50,000 IU q7 days x8 weeks, followed by 2000 IU daily    Follow up: VV in 3 weeks with results      Plan was discussed in detail with the patient, who is in agreement.  Patient is aware that she can contact with any questions, concerns, or new symptoms if needed before follow up appointment.    Time spent on this encounter: 75 minutes. This includes over 80% face to face time (obtaining history, documenting clinical information in the EMR, physical exam, discussing the plan with patient) and non-face to face time (such as preparing to see the patient (ie. Chart review, reviewing and interpreting previous labs and imaging), further EMR documentation, ordering tests, independently interpreting results).      Mary Urena MD    [facility]  03/07/2023

## 2023-03-07 NOTE — Clinical Note
Hi Dr. Méndez,   I saw Ms Araceli for her demyelinating disorder and planning to do a lumbar puncture. Wanted to make sure if there are any specific labs in CSF that you'd like to add from ID standpoint.   Best, Mary

## 2023-03-08 LAB
ALBUMIN SERPL ELPH-MCNC: 3.98 G/DL (ref 3.35–5.55)
ALPHA1 GLOB SERPL ELPH-MCNC: 0.31 G/DL (ref 0.17–0.41)
ALPHA2 GLOB SERPL ELPH-MCNC: 0.85 G/DL (ref 0.43–0.99)
B-GLOBULIN SERPL ELPH-MCNC: 0.81 G/DL (ref 0.5–1.1)
GAMMA GLOB SERPL ELPH-MCNC: 0.95 G/DL (ref 0.67–1.58)
INTERPRETATION SERPL IFE-IMP: NORMAL
PROT SERPL-MCNC: 6.9 G/DL (ref 6–8.4)

## 2023-03-09 ENCOUNTER — PATIENT MESSAGE (OUTPATIENT)
Dept: NEUROLOGY | Facility: CLINIC | Age: 23
End: 2023-03-09
Payer: COMMERCIAL

## 2023-03-09 LAB
ACE SERPL-CCNC: 35 U/L (ref 16–85)
PATHOLOGIST INTERPRETATION IFE: NORMAL
PATHOLOGIST INTERPRETATION SPE: NORMAL

## 2023-03-10 LAB — ZINC SERPL-MCNC: 94 UG/DL (ref 60–130)

## 2023-03-10 RX ORDER — ASPIRIN 325 MG
50000 TABLET, DELAYED RELEASE (ENTERIC COATED) ORAL
Qty: 12 CAPSULE | Refills: 0 | Status: SHIPPED | OUTPATIENT
Start: 2023-03-10

## 2023-03-12 DIAGNOSIS — G37.9 CNS DEMYELINATION: Primary | ICD-10-CM

## 2023-03-13 LAB
A-TOCOPHEROL VIT E SERPL-MCNC: 904 UG/DL (ref 500–1800)
CNS DEMYELINATING DISEASE EVAL: NORMAL
MOG-IGG1: NEGATIVE
NMO/AQP4 FACS,S: NEGATIVE

## 2023-03-15 ENCOUNTER — OFFICE VISIT (OUTPATIENT)
Dept: FAMILY MEDICINE | Facility: CLINIC | Age: 23
End: 2023-03-15
Payer: COMMERCIAL

## 2023-03-15 ENCOUNTER — OFFICE VISIT (OUTPATIENT)
Dept: PSYCHIATRY | Facility: CLINIC | Age: 23
End: 2023-03-15
Payer: COMMERCIAL

## 2023-03-15 VITALS
TEMPERATURE: 99 F | WEIGHT: 197.88 LBS | HEIGHT: 62 IN | DIASTOLIC BLOOD PRESSURE: 76 MMHG | BODY MASS INDEX: 36.42 KG/M2 | SYSTOLIC BLOOD PRESSURE: 112 MMHG | OXYGEN SATURATION: 98 % | HEART RATE: 79 BPM

## 2023-03-15 DIAGNOSIS — F43.23 ADJUSTMENT DISORDER WITH MIXED ANXIETY AND DEPRESSED MOOD: ICD-10-CM

## 2023-03-15 DIAGNOSIS — E66.01 SEVERE OBESITY: Primary | ICD-10-CM

## 2023-03-15 DIAGNOSIS — F90.0 ADHD (ATTENTION DEFICIT HYPERACTIVITY DISORDER), INATTENTIVE TYPE: Primary | ICD-10-CM

## 2023-03-15 PROBLEM — G37.9 CNS DEMYELINATION: Chronic | Status: ACTIVE | Noted: 2023-02-08

## 2023-03-15 PROBLEM — N39.0 UTI (URINARY TRACT INFECTION): Status: RESOLVED | Noted: 2023-02-09 | Resolved: 2023-03-15

## 2023-03-15 LAB — HTLV I/II ANTIBODIES: NONREACTIVE

## 2023-03-15 PROCEDURE — 1159F PR MEDICATION LIST DOCUMENTED IN MEDICAL RECORD: ICD-10-PCS | Mod: CPTII,S$GLB,, | Performed by: FAMILY MEDICINE

## 2023-03-15 PROCEDURE — 3008F PR BODY MASS INDEX (BMI) DOCUMENTED: ICD-10-PCS | Mod: CPTII,S$GLB,, | Performed by: FAMILY MEDICINE

## 2023-03-15 PROCEDURE — 99214 PR OFFICE/OUTPT VISIT, EST, LEVL IV, 30-39 MIN: ICD-10-PCS | Mod: S$GLB,,, | Performed by: FAMILY MEDICINE

## 2023-03-15 PROCEDURE — 99214 OFFICE O/P EST MOD 30 MIN: CPT | Mod: S$GLB,,, | Performed by: FAMILY MEDICINE

## 2023-03-15 PROCEDURE — 3074F PR MOST RECENT SYSTOLIC BLOOD PRESSURE < 130 MM HG: ICD-10-PCS | Mod: CPTII,S$GLB,, | Performed by: FAMILY MEDICINE

## 2023-03-15 PROCEDURE — 99214 OFFICE O/P EST MOD 30 MIN: CPT | Mod: 95,,, | Performed by: NURSE PRACTITIONER

## 2023-03-15 PROCEDURE — 99999 PR PBB SHADOW E&M-EST. PATIENT-LVL III: CPT | Mod: PBBFAC,,, | Performed by: FAMILY MEDICINE

## 2023-03-15 PROCEDURE — 3074F SYST BP LT 130 MM HG: CPT | Mod: CPTII,S$GLB,, | Performed by: FAMILY MEDICINE

## 2023-03-15 PROCEDURE — 99214 PR OFFICE/OUTPT VISIT, EST, LEVL IV, 30-39 MIN: ICD-10-PCS | Mod: 95,,, | Performed by: NURSE PRACTITIONER

## 2023-03-15 PROCEDURE — 1159F MED LIST DOCD IN RCRD: CPT | Mod: CPTII,S$GLB,, | Performed by: FAMILY MEDICINE

## 2023-03-15 PROCEDURE — 1160F RVW MEDS BY RX/DR IN RCRD: CPT | Mod: CPTII,95,, | Performed by: NURSE PRACTITIONER

## 2023-03-15 PROCEDURE — 3078F PR MOST RECENT DIASTOLIC BLOOD PRESSURE < 80 MM HG: ICD-10-PCS | Mod: CPTII,S$GLB,, | Performed by: FAMILY MEDICINE

## 2023-03-15 PROCEDURE — 3078F DIAST BP <80 MM HG: CPT | Mod: CPTII,S$GLB,, | Performed by: FAMILY MEDICINE

## 2023-03-15 PROCEDURE — 1159F MED LIST DOCD IN RCRD: CPT | Mod: CPTII,95,, | Performed by: NURSE PRACTITIONER

## 2023-03-15 PROCEDURE — 3008F BODY MASS INDEX DOCD: CPT | Mod: CPTII,S$GLB,, | Performed by: FAMILY MEDICINE

## 2023-03-15 PROCEDURE — 1159F PR MEDICATION LIST DOCUMENTED IN MEDICAL RECORD: ICD-10-PCS | Mod: CPTII,95,, | Performed by: NURSE PRACTITIONER

## 2023-03-15 PROCEDURE — 1160F PR REVIEW ALL MEDS BY PRESCRIBER/CLIN PHARMACIST DOCUMENTED: ICD-10-PCS | Mod: CPTII,95,, | Performed by: NURSE PRACTITIONER

## 2023-03-15 PROCEDURE — 99999 PR PBB SHADOW E&M-EST. PATIENT-LVL III: ICD-10-PCS | Mod: PBBFAC,,, | Performed by: FAMILY MEDICINE

## 2023-03-15 RX ORDER — DEXTROAMPHETAMINE SACCHARATE, AMPHETAMINE ASPARTATE, DEXTROAMPHETAMINE SULFATE AND AMPHETAMINE SULFATE 2.5; 2.5; 2.5; 2.5 MG/1; MG/1; MG/1; MG/1
10 TABLET ORAL 2 TIMES DAILY
Qty: 60 TABLET | Refills: 0 | Status: SHIPPED | OUTPATIENT
Start: 2023-04-14 | End: 2023-04-12 | Stop reason: SDUPTHER

## 2023-03-15 RX ORDER — ESCITALOPRAM OXALATE 10 MG/1
10 TABLET ORAL DAILY
Qty: 30 TABLET | Refills: 11 | Status: SHIPPED | OUTPATIENT
Start: 2023-03-15 | End: 2023-08-10

## 2023-03-15 RX ORDER — TIRZEPATIDE 2.5 MG/.5ML
2.5 INJECTION, SOLUTION SUBCUTANEOUS
Qty: 2 PEN | Refills: 3 | Status: SHIPPED | OUTPATIENT
Start: 2023-03-15 | End: 2023-03-17

## 2023-03-15 RX ORDER — DEXTROAMPHETAMINE SACCHARATE, AMPHETAMINE ASPARTATE, DEXTROAMPHETAMINE SULFATE AND AMPHETAMINE SULFATE 2.5; 2.5; 2.5; 2.5 MG/1; MG/1; MG/1; MG/1
10 TABLET ORAL 2 TIMES DAILY
Qty: 60 TABLET | Refills: 0 | Status: SHIPPED | OUTPATIENT
Start: 2023-05-14 | End: 2023-08-10 | Stop reason: SDUPTHER

## 2023-03-15 RX ORDER — DEXTROAMPHETAMINE SACCHARATE, AMPHETAMINE ASPARTATE, DEXTROAMPHETAMINE SULFATE AND AMPHETAMINE SULFATE 2.5; 2.5; 2.5; 2.5 MG/1; MG/1; MG/1; MG/1
10 TABLET ORAL 2 TIMES DAILY
Qty: 60 TABLET | Refills: 0 | Status: SHIPPED | OUTPATIENT
Start: 2023-03-15 | End: 2023-08-10 | Stop reason: SDUPTHER

## 2023-03-15 NOTE — PROGRESS NOTES
[facility]  Progress Note    SUBJECTIVE:     Chief Complaint   Patient presents with    Weight Loss     14       HPI   Zeny Charles  is a 22 y.o. female here for wanting to try some weight loss medications. Was on mounjaro before which worked. Has been trying to eat healthier and stay active. Patient has no other new complaints/problems at this time.      Review of patient's allergies indicates:  No Known Allergies    Past Medical History:   Diagnosis Date    Factor 5 Leiden mutation, heterozygous 9/13/2016    Seizures     1 at the age of 10     Past Surgical History:   Procedure Laterality Date    TONSILLECTOMY  6 years old     Family History   Problem Relation Age of Onset    Hypertension Mother     Irregular menses Mother     Factor V Leiden deficiency Mother     Interstitial cystitis Mother     No Known Problems Father     Factor V Leiden deficiency Sister     Interstitial cystitis Maternal Aunt     Factor V Leiden deficiency Maternal Grandmother     Thyroid disease Maternal Grandmother         hypothyroidism    Mitral valve prolapse Maternal Grandfather     Congenital heart disease Maternal Grandfather         valve    Crohn's disease Paternal Grandmother     Breast cancer Neg Hx     Colon cancer Neg Hx     Ovarian cancer Neg Hx     Arrhythmia Neg Hx     Early death Neg Hx     Heart attacks under age 50 Neg Hx     Pacemaker/defibrilator Neg Hx      Social History     Tobacco Use    Smoking status: Never    Smokeless tobacco: Never   Substance Use Topics    Alcohol use: No    Drug use: No        Review of Systems   Constitutional:  Negative for chills and fever.   HENT: Negative.     Respiratory: Negative.  Negative for shortness of breath.    Cardiovascular: Negative.  Negative for chest pain.   Gastrointestinal: Negative.  Negative for abdominal pain, nausea and vomiting.   Genitourinary: Negative.    Neurological:  Negative for headaches.   All other systems reviewed and are negative.  OBJECTIVE:      Vitals:    03/15/23 1553   BP: 112/76   Pulse: 79   Temp: 98.9 °F (37.2 °C)     Body mass index is 36.19 kg/m².    Physical Exam  Constitutional:       General: She is not in acute distress.     Appearance: Normal appearance. She is not diaphoretic.   HENT:      Head: Normocephalic and atraumatic.   Eyes:      Conjunctiva/sclera: Conjunctivae normal.   Pulmonary:      Effort: Pulmonary effort is normal.   Neurological:      Mental Status: She is alert and oriented to person, place, and time.       Old records were reviewed. Labs and/or images were independently reviewed.    ASSESSMENT     1. Severe obesity        PLAN:     Severe obesity  -     Start tirzepatide (MOUNJARO) 2.5 mg/0.5 mL PnIj; Inject 2.5 mg into the skin every 7 days.  Dispense: 2 pen; Refill: 3  -    I have discussed the common side effects of this medication with the patient and answered all of the questions they had at the time of this visit regarding this medication.    RTC PRKYARA Rendon MD  03/15/2023 4:31 PM

## 2023-03-15 NOTE — PROGRESS NOTES
Outpatient Psychiatry Follow-Up Visit (MD/NP)    3/15/2023    Clinical Status of Patient:  Outpatient (Ambulatory)    Chief Complaint:  Zeny Charles is a 22 y.o. female who presents today for follow-up of attention problems.  Met with patient.      Last visit was: 5/25/22. Chart and  reviewed  The patient location is: home  The chief complaint leading to consultation is: attention problems    Visit type: audiovisual    Face to Face time with patient: 30 minutes  35 minutes of total time spent on the encounter, which includes face to face time and non-face to face time preparing to see the patient (eg, review of tests), Obtaining and/or reviewing separately obtained history, Documenting clinical information in the electronic or other health record, Independently interpreting results (not separately reported) and communicating results to the patient/family/caregiver, or Care coordination (not separately reported).     Each patient to whom he or she provides medical services by telemedicine is:  (1) informed of the relationship between the physician and patient and the respective role of any other health care provider with respect to management of the patient; and (2) notified that he or she may decline to receive medical services by telemedicine and may withdraw from such care at any time.    Interval History and Content of Current Session:  Current Psychiatric MedicatioCounseling this visit focused on building adaptive coping skills, medication teaching, and cognitive behavioral therapy (CBT)ns/changes  Start Adderall 10 mg po BID    Virtual Visit:  Set appointment for anxiety; excessive worrying about medical issues. Denies panic attacks.  Pt reports she is doing well with Adderall for managing ADHD sx. Thought processes are linear, clear, and organized. Denies SI/HI/AVH.     Psychotherapy:  Target symptoms: distractability  Why chosen therapy is appropriate versus another modality: relevant to diagnosis  Outcome  monitoring methods: self-report  Therapeutic intervention type: insight oriented psychotherapy  Topics discussed/themes: building skills sets for symptom management, symptom recognition  The patient's response to the intervention is accepting. The patient's progress toward treatment goals is good.   Duration of intervention: 14 minutes.    Review of Systems   PSYCHIATRIC: Pertinant items are noted in the narrative.  CONSTITUTIONAL: No weight gain or loss.   MUSCULOSKELETAL: No pain or stiffness of the joints.  NEUROLOGIC: No weakness, sensory changes, seizures, confusion, memory loss, tremor or other abnormal movements.  ENDOCRINE: No polydipsia or polyuria.  INTEGUMENTARY: No rashes or lacerations.  EYES: No exophthalmos, jaundice or blindness.  ENT: No dizziness, tinnitus or hearing loss.  RESPIRATORY: No shortness of breath.  CARDIOVASCULAR: No tachycardia or chest pain.  GASTROINTESTINAL: No nausea, vomiting, pain, constipation or diarrhea.  GENITOURINARY: No frequency, dysuria or sexual dysfunction.  HEMATOLOGIC/LYMPHATIC: No excessive bleeding, prolonged or excessive bleeding after dental extraction/injury.  ALLERGIC/IMMUNOLOGIC: No allergic response to materials, foods or animals at this time.    Past Medical, Family and Social History: The patient's past medical, family and social history have been reviewed and updated as appropriate within the electronic medical record - see encounter notes.    Compliance: yes    Side effects: None    Risk Parameters:  Patient reports no suicidal ideation  Patient reports no homicidal ideation  Patient reports no self-injurious behavior  Patient reports no violent behavior    Exam (detailed: at least 9 elements; comprehensive: all 15 elements)   Constitutional  Vitals:  Most recent vital signs, dated greater than 90 days prior to this appointment, were reviewed.   There were no vitals filed for this visit.     General:  unremarkable, age appropriate      Musculoskeletal  Muscle Strength/Tone:  not examined   Gait & Station:  non-ataxic     Psychiatric  Speech:  no latency; no press   Mood & Affect:  steady  congruent and appropriate   Thought Process:  normal and logical   Associations:  intact   Thought Content:  normal, no suicidality, no homicidality, delusions, or paranoia   Insight:  intact   Judgement: behavior is adequate to circumstances   Orientation:  grossly intact   Memory: intact for content of interview   Language: grossly intact   Attention Span & Concentration:  able to focus   Fund of Knowledge:  intact and appropriate to age and level of education     Assessment and Diagnosis   Status/Progress: Based on the examination today, the patient's problem(s) is/are adequately but not ideally controlled.  New problems have been presented today (anxiety).   Co-morbidities and Lack of compliance are not complicating management of the primary condition.  There are no active rule-out diagnoses for this patient at this time.     General Impression:       ICD-10-CM ICD-9-CM   1. Adjustment disorder with mixed anxiety and depressed mood  F43.23 309.28   2. ADHD (attention deficit hyperactivity disorder), inattentive type  F90.0 314.00     Intervention/Counseling/Treatment Plan   Medication Management: The risks and benefits of medication were discussed with the patient.  Continue Adderall 10 mg po BID  Start Lexparo 10 mg daily    Return to Clinic: 3 months    Risks, benefits, side effects and alternative treatments discussed with patient. Patient agrees with the current plan as documented.  Encouraged Patient to keep future appointments.  Take medications as prescribed and abstain from substance abuse.  Pt to present to ED for thoughts to harm herself or others

## 2023-03-16 ENCOUNTER — PATIENT MESSAGE (OUTPATIENT)
Dept: FAMILY MEDICINE | Facility: CLINIC | Age: 23
End: 2023-03-16
Payer: COMMERCIAL

## 2023-03-16 DIAGNOSIS — E66.01 SEVERE OBESITY: Primary | ICD-10-CM

## 2023-03-17 ENCOUNTER — PATIENT MESSAGE (OUTPATIENT)
Dept: FAMILY MEDICINE | Facility: CLINIC | Age: 23
End: 2023-03-17
Payer: COMMERCIAL

## 2023-03-17 DIAGNOSIS — E66.01 SEVERE OBESITY: ICD-10-CM

## 2023-03-17 RX ORDER — TIRZEPATIDE 5 MG/.5ML
5 INJECTION, SOLUTION SUBCUTANEOUS
Qty: 2 PEN | Refills: 3 | Status: SHIPPED | OUTPATIENT
Start: 2023-03-17 | End: 2023-03-18 | Stop reason: SDUPTHER

## 2023-03-18 ENCOUNTER — PATIENT MESSAGE (OUTPATIENT)
Dept: FAMILY MEDICINE | Facility: CLINIC | Age: 23
End: 2023-03-18
Payer: COMMERCIAL

## 2023-03-18 DIAGNOSIS — E66.01 SEVERE OBESITY: ICD-10-CM

## 2023-03-18 RX ORDER — TIRZEPATIDE 5 MG/.5ML
5 INJECTION, SOLUTION SUBCUTANEOUS
Qty: 2 PEN | Refills: 3 | Status: SHIPPED | OUTPATIENT
Start: 2023-03-18 | End: 2023-04-11 | Stop reason: SDUPTHER

## 2023-03-27 PROBLEM — G56.03 CARPAL TUNNEL SYNDROME, BILATERAL: Status: ACTIVE | Noted: 2023-03-27

## 2023-03-27 PROBLEM — G56.23 ULNAR NEUROPATHY OF BOTH UPPER EXTREMITIES: Status: ACTIVE | Noted: 2023-03-27

## 2023-04-11 ENCOUNTER — OFFICE VISIT (OUTPATIENT)
Dept: FAMILY MEDICINE | Facility: CLINIC | Age: 23
End: 2023-04-11
Payer: COMMERCIAL

## 2023-04-11 ENCOUNTER — PATIENT MESSAGE (OUTPATIENT)
Dept: PSYCHIATRY | Facility: CLINIC | Age: 23
End: 2023-04-11
Payer: COMMERCIAL

## 2023-04-11 DIAGNOSIS — E66.01 SEVERE OBESITY: ICD-10-CM

## 2023-04-11 DIAGNOSIS — J30.2 SEASONAL ALLERGIES: Primary | ICD-10-CM

## 2023-04-11 PROCEDURE — 99214 OFFICE O/P EST MOD 30 MIN: CPT | Mod: 95,,, | Performed by: FAMILY MEDICINE

## 2023-04-11 PROCEDURE — 99214 PR OFFICE/OUTPT VISIT, EST, LEVL IV, 30-39 MIN: ICD-10-PCS | Mod: 95,,, | Performed by: FAMILY MEDICINE

## 2023-04-11 RX ORDER — TIRZEPATIDE 5 MG/.5ML
5 INJECTION, SOLUTION SUBCUTANEOUS
Qty: 12 PEN | Refills: 3 | Status: SHIPPED | OUTPATIENT
Start: 2023-04-11 | End: 2023-05-31

## 2023-04-11 RX ORDER — LORATADINE 10 MG/1
10 TABLET ORAL DAILY PRN
Qty: 30 TABLET | Refills: 3 | Status: SHIPPED | OUTPATIENT
Start: 2023-04-11 | End: 2023-08-10

## 2023-04-11 NOTE — LETTER
April 11, 2023      [facility]  [address] [facility]  [place] [address]  Phone: [phone]  Fax: [phone]       Patient: Zeny Charles   YOB: 2000  Date of Visit: 04/11/2023    To Whom It May Concern:    Susana Charles  was at [facility] on 04/11/2023. Please excuse 4/3/23 and 4/6/23. The patient may return to work/school on 04/11/2023 with no restrictions. If you have any questions or concerns, or if I can be of further assistance, please do not hesitate to contact me.    Sincerely,      Chris Rendon MD      "Chief Complaint  Establish Care, Bump-R groin (x months, won't go away-right on panty line ), and Stomach issues (Hpylori? Daughter had it )    Subjective          Gisella Mora presents to [facility]  History of Present Illness  NP to establish care  Has not had a PCP just sees GYN or Dermatology or [facility] for illness    Hidradenitis suppurative   Bumps groin that come and go, one area that is not healing for the several months   Not in grown hair or folliculitis, show up over night, get red, drain foul smelling liquid, come to head, contain blood, not itchy, painful bikini line onright side, thinks she might have a tunnel  Has seen Dermatology who started her on Clindamycin topical and she uses Benza-clens   Could not take Spironolactone due to making her feel sick.  Typically bumps go away but leave a scar and indention in skin    Constipation issues for the past yr or more  Stomach pain, worse after eating, bloated, days without BM then have several bowel movements, hits all at once, clammy nauseated, sweating until everything passed and terrible flatulance  IBS ?  Drinks plenty of fluids, exercising, eating plenty of fiber, fresh fruits vegetables.  Daughter tested + H pylori ab so she is concerned that maybe she has H pylori  Has taken Miralax & Metamucil makes symptoms worse   + family hx Paternal grandmother with colon cancer dx late 60s  Never had colonoscopy; has not tried Magnesium or Amitiza or Linzess.    ADHD and depression  Sees PMHNP for Vyvanse and Duloxeting     Objective   Vital Signs:   /78   Pulse 72   Temp 98.9 °F (37.2 °C)   Resp 18   Ht 170.2 cm (67\")   Wt 81.2 kg (179 lb)   SpO2 100%   BMI 28.04 kg/m²     Physical Exam  Vitals and nursing note reviewed.   Constitutional:       General: She is not in acute distress.     Appearance: Normal appearance. She is well-developed.   HENT:      Head: Normocephalic.      Right Ear: External ear normal.      " Left Ear: External ear normal.      Nose: Nose normal.      Mouth/Throat:      Lips: Pink.   Eyes:      General: Lids are normal.      Pupils: Pupils are equal, round, and reactive to light.   Neck:      Thyroid: Thyromegaly present. No thyroid mass or thyroid tenderness.      Vascular: Normal carotid pulses. No carotid bruit or JVD.      Trachea: Trachea normal.   Cardiovascular:      Rate and Rhythm: Normal rate and regular rhythm.      Pulses: Normal pulses.      Heart sounds: Normal heart sounds, S1 normal and S2 normal.   Pulmonary:      Effort: Pulmonary effort is normal.      Breath sounds: Normal breath sounds.   Abdominal:      General: Abdomen is flat. Bowel sounds are normal.      Palpations: Abdomen is soft.      Tenderness: There is no abdominal tenderness. There is no guarding or rebound.   Musculoskeletal:         General: Normal range of motion.      Cervical back: Normal range of motion and neck supple.   Lymphadenopathy:      Head:      Right side of head: No tonsillar, preauricular, posterior auricular or occipital adenopathy.      Left side of head: No tonsillar, preauricular, posterior auricular or occipital adenopathy.      Cervical: No cervical adenopathy.   Skin:     General: Skin is warm and moist.      Capillary Refill: Capillary refill takes less than 2 seconds.      Findings: Lesion and rash present. No abscess. Rash is nodular.          Neurological:      Mental Status: She is alert and oriented to person, place, and time.      Deep Tendon Reflexes: Reflexes are normal and symmetric.      Reflex Scores:       Bicep reflexes are 2+ on the right side and 2+ on the left side.       Patellar reflexes are 2+ on the right side and 2+ on the left side.  Psychiatric:         Mood and Affect: Mood and affect normal.         Speech: Speech normal.         Behavior: Behavior normal.         Thought Content: Thought content normal.         Judgment: Judgment normal.        Result Review :                  Assessment and Plan    Diagnoses and all orders for this visit:    1. Hidradenitis suppurativa (Primary)  -     sulfamethoxazole-trimethoprim (Bactrim DS) 800-160 MG per tablet; Take 1 tablet by mouth 2 (Two) Times a Day.  Dispense: 20 tablet; Refill: 0    2. Flatulence/gas pain/belching  -     H. Pylori Breath Test - Breath, Lung  -     Comprehensive Metabolic Panel  -     CBC & Differential    3. Other constipation  -     H. Pylori Breath Test - Breath, Lung  -     Thyroid Panel With TSH  -     Magnesium, RBC    4. Enlargement of thyroid  -     Thyroid Panel With TSH  -     Thyroid Peroxidase Antibody    5. Screening, lipid  -     Lipid panel        Follow Up   No follow-ups on file.  Patient was given instructions and counseling regarding her condition or for health maintenance advice. Please see specific information pulled into the AVS if appropriate.   Will check labs and notify pt of results  Will give pt a sample of Amitiza 24 mcg to take one time each morning for chronic constipation  Will see pt back depending on lab results  Will treat with Bactrim today, continue current treatment with topical Clindamycin for skin issues.   Will refer for colonoscopy if symptoms not improving due to family hx colon cancer   Pt agrees

## 2023-04-11 NOTE — PROGRESS NOTES
[facility]  Progress Note    SUBJECTIVE:     Chief Complaint   Patient presents with    Otalgia       The patient location is: Home  The chief complaint leading to consultation is: Otalgia    Visit type: Virtual visit with synchronous audio and video  Total time spent with patient: 25  Each patient to whom he or she provides medical services by telemedicine is:  (1) informed of the relationship between the physician and patient and the respective role of any other health care provider with respect to management of the patient; and (2) notified that he or she may decline to receive medical services by telemedicine and may withdraw from such care at any time.      HPI: Patient is a 22 y.o. female via virtual visit, here for c/o ear pain. Started last week and has been on/off. No fevers, chills. Also no discharge or hearing problems. No current ear pain. Missed 2 days of school last week. Patient has no other new complaints/problems at this time.      Review of patient's allergies indicates:  No Known Allergies    Past Medical History:   Diagnosis Date    Factor 5 Leiden mutation, heterozygous 9/13/2016    Seizures     1 at the age of 10     Past Surgical History:   Procedure Laterality Date    TONSILLECTOMY  6 years old     Family History   Problem Relation Age of Onset    Hypertension Mother     Irregular menses Mother     Factor V Leiden deficiency Mother     Interstitial cystitis Mother     No Known Problems Father     Factor V Leiden deficiency Sister     Interstitial cystitis Maternal Aunt     Factor V Leiden deficiency Maternal Grandmother     Thyroid disease Maternal Grandmother         hypothyroidism    Mitral valve prolapse Maternal Grandfather     Congenital heart disease Maternal Grandfather         valve    Crohn's disease Paternal Grandmother     Breast cancer Neg Hx     Colon cancer Neg Hx     Ovarian cancer Neg Hx     Arrhythmia Neg Hx     Early death Neg Hx     Heart attacks under age 50 Neg  Hx     Pacemaker/defibrilator Neg Hx      Social History     Tobacco Use    Smoking status: Never    Smokeless tobacco: Never   Substance Use Topics    Alcohol use: No    Drug use: No        Review of Systems   HENT:  Positive for ear pain. Negative for ear discharge, hearing loss and sore throat.    Respiratory:  Negative for cough.    Gastrointestinal:  Negative for abdominal pain, diarrhea and vomiting.   Musculoskeletal:  Negative for neck pain.   Skin:  Negative for rash.   Neurological:  Negative for headaches.   OBJECTIVE:   There were no vitals filed for this visit.  There is no height or weight on file to calculate BMI.    Physical Exam  Constitutional:       General: She is not in acute distress.     Appearance: She is not toxic-appearing or diaphoretic.   HENT:      Head: Normocephalic and atraumatic.   Eyes:      General:         Right eye: No foreign body.         Left eye: No foreign body.      Conjunctiva/sclera: Conjunctivae normal.      Right eye: Right conjunctiva is not injected. No exudate or hemorrhage.     Left eye: Left conjunctiva is not injected. No exudate or hemorrhage.  Pulmonary:      Effort: No respiratory distress.   Neurological:      Mental Status: She is oriented to person, place, and time. She is not lethargic.       Old records were reviewed. Labs and/or images were independently reviewed.    ASSESSMENT     1. Seasonal allergies    2. Severe obesity        PLAN:     Seasonal allergies  -     loratadine (CLARITIN) 10 mg tablet; Take 1 tablet (10 mg total) by mouth daily as needed for Allergies (or runny nose).  Dispense: 30 tablet; Refill: 3  -     suspect allergies. If persistent, will need in office visit for evaluation.    Severe obesity  -     tirzepatide (MOUNJARO) 5 mg/0.5 mL PnIj; Inject 5 mg into the skin every 7 days.  Dispense: 12 pen; Refill: 3  -     Stable. Continue current regimen.    RTC PRKYARA Rendon MD  04/11/2023 11:16 AM

## 2023-04-12 DIAGNOSIS — F90.0 ADHD (ATTENTION DEFICIT HYPERACTIVITY DISORDER), INATTENTIVE TYPE: ICD-10-CM

## 2023-04-12 RX ORDER — DEXTROAMPHETAMINE SACCHARATE, AMPHETAMINE ASPARTATE, DEXTROAMPHETAMINE SULFATE AND AMPHETAMINE SULFATE 2.5; 2.5; 2.5; 2.5 MG/1; MG/1; MG/1; MG/1
10 TABLET ORAL 2 TIMES DAILY
Qty: 60 TABLET | Refills: 0 | Status: SHIPPED | OUTPATIENT
Start: 2023-04-12 | End: 2024-01-22

## 2023-04-13 ENCOUNTER — OFFICE VISIT (OUTPATIENT)
Dept: OBSTETRICS AND GYNECOLOGY | Facility: CLINIC | Age: 23
End: 2023-04-13
Payer: COMMERCIAL

## 2023-04-13 VITALS
BODY MASS INDEX: 34.52 KG/M2 | WEIGHT: 188.69 LBS | DIASTOLIC BLOOD PRESSURE: 70 MMHG | SYSTOLIC BLOOD PRESSURE: 120 MMHG

## 2023-04-13 DIAGNOSIS — Z11.3 SCREEN FOR STD (SEXUALLY TRANSMITTED DISEASE): ICD-10-CM

## 2023-04-13 DIAGNOSIS — A60.00 HERPES SIMPLEX INFECTION OF GENITOURINARY SYSTEM: Primary | ICD-10-CM

## 2023-04-13 DIAGNOSIS — R30.0 DYSURIA: ICD-10-CM

## 2023-04-13 PROCEDURE — 99204 PR OFFICE/OUTPT VISIT, NEW, LEVL IV, 45-59 MIN: ICD-10-PCS | Mod: S$GLB,,, | Performed by: OBSTETRICS & GYNECOLOGY

## 2023-04-13 PROCEDURE — 3008F PR BODY MASS INDEX (BMI) DOCUMENTED: ICD-10-PCS | Mod: CPTII,S$GLB,, | Performed by: OBSTETRICS & GYNECOLOGY

## 2023-04-13 PROCEDURE — 99204 OFFICE O/P NEW MOD 45 MIN: CPT | Mod: S$GLB,,, | Performed by: OBSTETRICS & GYNECOLOGY

## 2023-04-13 PROCEDURE — 3078F DIAST BP <80 MM HG: CPT | Mod: CPTII,S$GLB,, | Performed by: OBSTETRICS & GYNECOLOGY

## 2023-04-13 PROCEDURE — 3078F PR MOST RECENT DIASTOLIC BLOOD PRESSURE < 80 MM HG: ICD-10-PCS | Mod: CPTII,S$GLB,, | Performed by: OBSTETRICS & GYNECOLOGY

## 2023-04-13 PROCEDURE — 87529 HSV DNA AMP PROBE: CPT | Performed by: OBSTETRICS & GYNECOLOGY

## 2023-04-13 PROCEDURE — 3074F SYST BP LT 130 MM HG: CPT | Mod: CPTII,S$GLB,, | Performed by: OBSTETRICS & GYNECOLOGY

## 2023-04-13 PROCEDURE — 99999 PR PBB SHADOW E&M-EST. PATIENT-LVL III: CPT | Mod: PBBFAC,,, | Performed by: OBSTETRICS & GYNECOLOGY

## 2023-04-13 PROCEDURE — 87591 N.GONORRHOEAE DNA AMP PROB: CPT | Performed by: OBSTETRICS & GYNECOLOGY

## 2023-04-13 PROCEDURE — 1159F MED LIST DOCD IN RCRD: CPT | Mod: CPTII,S$GLB,, | Performed by: OBSTETRICS & GYNECOLOGY

## 2023-04-13 PROCEDURE — 99999 PR PBB SHADOW E&M-EST. PATIENT-LVL III: ICD-10-PCS | Mod: PBBFAC,,, | Performed by: OBSTETRICS & GYNECOLOGY

## 2023-04-13 PROCEDURE — 1159F PR MEDICATION LIST DOCUMENTED IN MEDICAL RECORD: ICD-10-PCS | Mod: CPTII,S$GLB,, | Performed by: OBSTETRICS & GYNECOLOGY

## 2023-04-13 PROCEDURE — 3074F PR MOST RECENT SYSTOLIC BLOOD PRESSURE < 130 MM HG: ICD-10-PCS | Mod: CPTII,S$GLB,, | Performed by: OBSTETRICS & GYNECOLOGY

## 2023-04-13 PROCEDURE — 3008F BODY MASS INDEX DOCD: CPT | Mod: CPTII,S$GLB,, | Performed by: OBSTETRICS & GYNECOLOGY

## 2023-04-13 PROCEDURE — 87086 URINE CULTURE/COLONY COUNT: CPT | Performed by: OBSTETRICS & GYNECOLOGY

## 2023-04-13 RX ORDER — VALACYCLOVIR HYDROCHLORIDE 1 G/1
1000 TABLET, FILM COATED ORAL EVERY 12 HOURS
Qty: 14 TABLET | Refills: 1 | Status: SHIPPED | OUTPATIENT
Start: 2023-04-13 | End: 2023-04-13 | Stop reason: SDUPTHER

## 2023-04-13 RX ORDER — LIDOCAINE 50 MG/G
OINTMENT TOPICAL
Qty: 30 G | Refills: 1 | Status: SHIPPED | OUTPATIENT
Start: 2023-04-13 | End: 2023-08-10

## 2023-04-13 RX ORDER — LIDOCAINE 50 MG/G
OINTMENT TOPICAL
Qty: 30 G | Refills: 1 | Status: SHIPPED | OUTPATIENT
Start: 2023-04-13 | End: 2023-04-13 | Stop reason: SDUPTHER

## 2023-04-13 RX ORDER — VALACYCLOVIR HYDROCHLORIDE 1 G/1
1000 TABLET, FILM COATED ORAL EVERY 12 HOURS
Qty: 14 TABLET | Refills: 1 | Status: SHIPPED | OUTPATIENT
Start: 2023-04-13 | End: 2023-08-10

## 2023-04-13 NOTE — PROGRESS NOTES
SUBJECTIVE:   22 y.o. female   for vaginal sores    Patient's last menstrual period was 2023 (exact date)..      Pt noted vulvar/vaginal sores x 5 days, noted blister   painful to touch and burns with urination  Denies changes in urinary frequency or urgency  Has never been tested for std, denies h/o herpes or cold sores    She is sexually active with one male partner, does not use condoms  Cycles are monthly  She does not want to start on contraception today    OB History    Para Term  AB Living   0 0 0 0 0 0   SAB IAB Ectopic Multiple Live Births   0 0 0 0 0     Past Medical History:   Diagnosis Date    Factor 5 Leiden mutation, heterozygous 2016    Seizures     1 at the age of 10     Past Surgical History:   Procedure Laterality Date    TONSILLECTOMY  6 years old     Social History     Socioeconomic History    Marital status: Single   Tobacco Use    Smoking status: Never    Smokeless tobacco: Never   Substance and Sexual Activity    Alcohol use: No    Drug use: No    Sexual activity: Never   Social History Narrative    In 12th grade. Lives with Mom and Dad. No smokers. No alcohol, tobacco, illicit drugs. 1 dog.      Family History   Problem Relation Age of Onset    Hypertension Mother     Irregular menses Mother     Factor V Leiden deficiency Mother     Interstitial cystitis Mother     No Known Problems Father     Factor V Leiden deficiency Sister     Interstitial cystitis Maternal Aunt     Factor V Leiden deficiency Maternal Grandmother     Thyroid disease Maternal Grandmother         hypothyroidism    Mitral valve prolapse Maternal Grandfather     Congenital heart disease Maternal Grandfather         valve    Crohn's disease Paternal Grandmother     Breast cancer Neg Hx     Colon cancer Neg Hx     Ovarian cancer Neg Hx     Arrhythmia Neg Hx     Early death Neg Hx     Heart attacks under age 50 Neg Hx     Pacemaker/defibrilator Neg Hx          Current Outpatient Medications    Medication Sig Dispense Refill    acetaminophen (TYLENOL) 500 MG tablet Take 500 mg by mouth every 8 (eight) hours as needed for Pain.      cholecalciferol, vitamin D3, 1,250 mcg (50,000 unit) capsule Take 1 capsule (50,000 Units total) by mouth every 7 days. 12 capsule 0    clindamycin phosphate 1% (CLINDAGEL) 1 % gel Apply topically 2 (two) times daily. 60 g 5    dextroamphetamine-amphetamine 10 mg Tab Take 1 tablet (10 mg total) by mouth 2 (two) times a day. 60 tablet 0    [START ON 5/14/2023] dextroamphetamine-amphetamine 10 mg Tab Take 1 tablet (10 mg total) by mouth 2 (two) times a day. 60 tablet 0    dextroamphetamine-amphetamine 10 mg Tab Take 1 tablet (10 mg total) by mouth 2 (two) times a day. 60 tablet 0    EScitalopram oxalate (LEXAPRO) 10 MG tablet Take 1 tablet (10 mg total) by mouth once daily. 30 tablet 11    ibuprofen (ADVIL,MOTRIN) 400 MG tablet Take 400 mg by mouth every 4 (four) hours.      loratadine (CLARITIN) 10 mg tablet Take 1 tablet (10 mg total) by mouth daily as needed for Allergies (or runny nose). 30 tablet 3    tirzepatide (MOUNJARO) 5 mg/0.5 mL PnIj Inject 5 mg into the skin every 7 days. 12 pen 3     No current facility-administered medications for this visit.     Allergies: Patient has no known allergies.       ROS  ROS:  GENERAL: Denies weight gain or weight loss. Feeling well overall.   SKIN: Denies rash or lesions.   HEAD: Denies head injury or headache.   NODES: Denies enlarged lymph nodes.   CHEST: Denies chest pain or shortness of breath.   CARDIOVASCULAR: Denies palpitations or left sided chest pain.   ABDOMEN: No abdominal pain, constipation, diarrhea, nausea, vomiting or rectal bleeding.   URINARY: No frequency, dysuria, hematuria, or burning on urination.  REPRODUCTIVE: see HPI  BREASTS: The patient performs breast self-examination and denies pain, lumps, or nipple discharge.   HEMATOLOGIC: No easy bruisability or excessive bleeding.  MUSCULOSKELETAL: Denies joint pain  or swelling.   NEUROLOGIC: Denies syncope or weakness.   PSYCHIATRIC: Denies depression, anxiety or mood swings.      OBJECTIVE:   /70   Wt 85.6 kg (188 lb 11.4 oz)   LMP 04/07/2023 (Exact Date)   BMI 34.52 kg/m²   The patient appears well, alert, oriented x 3, in no distress.  NECK: negative, no thyromegaly, trachea midline  SKIN: normal, good color, good turgor, and no acne, striae, hirsutism  ABDOMEN: soft, non-tender; bowel sounds normal; no masses,  no organomegaly and no hernias, masses, or hepatosplenomegaly  BLADDER: soft  GENITALIA:  group of ulcers at perineum, very painful to touch - this was culture  URETHRA: normal appearing urethra with no masses, tenderness or lesions and normal urethra, normal urethral meatus  Pt does not tolerate speculum exam      ASSESSMENT:   .Zeny was seen today for well woman and urinary tract infection.    Diagnoses and all orders for this visit:    Herpes simplex infection of genitourinary system    Screen for STD (sexually transmitted disease)  -     C. trachomatis/N. gonorrhoeae by AMP DNA  -     RPR; Future  -     HIV-1 and HIV-2 antibodies; Future  -     Hepatitis panel, acute; Future  -     HSV by Rapid PCR, Non-Blood [facility]; Vagina; Future  -     HSV 1 & 2, IgG; Future  -     HSV 1 & 2, IgM; Future    Dysuria  -     Urine culture  Primary Genital HSV    -     LIDOcaine (XYLOCAINE) 5 % Oint ointment; Apply topically as needed (apply to affected area 2x daily as needed).  -     valACYclovir (VALTREX) 1000 MG tablet; Take 1 tablet (1,000 mg total) by mouth every 12 (twelve) hours. for 7 days      The nature of herpes genitalis is fully explained. It is an incurable recurrent disease, which is generally benign, and treatable with medications such as Zovirax, Famvir or Valtrex. It is rare, but not impossible, to transmit the virus while in an asymptomatic stage; but highly likely to transmit during acute symptoms; thus avoidance of sex during symptomatic phases is  best. The use of a condom between spouses during asymptomatic phases is a personal but not mandatory choice. The patient indicates understanding of these issues. The current recommendation is to use medication at the time of outbreak for the relief of symptoms .       RTC in 9/2023 for WINIFREDE/pap

## 2023-04-14 ENCOUNTER — APPOINTMENT (OUTPATIENT)
Dept: LAB | Facility: HOSPITAL | Age: 23
End: 2023-04-14
Attending: OBSTETRICS & GYNECOLOGY
Payer: COMMERCIAL

## 2023-04-14 PROCEDURE — 80074 ACUTE HEPATITIS PANEL: CPT | Performed by: OBSTETRICS & GYNECOLOGY

## 2023-04-14 PROCEDURE — 86694 HERPES SIMPLEX NES ANTBDY: CPT | Performed by: OBSTETRICS & GYNECOLOGY

## 2023-04-14 PROCEDURE — 86592 SYPHILIS TEST NON-TREP QUAL: CPT | Performed by: OBSTETRICS & GYNECOLOGY

## 2023-04-14 PROCEDURE — 36415 COLL VENOUS BLD VENIPUNCTURE: CPT | Performed by: OBSTETRICS & GYNECOLOGY

## 2023-04-14 PROCEDURE — 87389 HIV-1 AG W/HIV-1&-2 AB AG IA: CPT | Performed by: OBSTETRICS & GYNECOLOGY

## 2023-04-14 PROCEDURE — 86695 HERPES SIMPLEX TYPE 1 TEST: CPT | Performed by: OBSTETRICS & GYNECOLOGY

## 2023-04-15 LAB
BACTERIA UR CULT: NORMAL
C TRACH DNA SPEC QL NAA+PROBE: NOT DETECTED
N GONORRHOEA DNA SPEC QL NAA+PROBE: NOT DETECTED

## 2023-04-16 LAB
HSV1 DNA SPEC QL NAA+PROBE: NEGATIVE
HSV2 DNA SPEC QL NAA+PROBE: POSITIVE
SPECIMEN SOURCE: ABNORMAL

## 2023-04-27 ENCOUNTER — TELEPHONE (OUTPATIENT)
Dept: FAMILY MEDICINE | Facility: CLINIC | Age: 23
End: 2023-04-27
Payer: COMMERCIAL

## 2023-04-27 ENCOUNTER — PATIENT MESSAGE (OUTPATIENT)
Dept: FAMILY MEDICINE | Facility: CLINIC | Age: 23
End: 2023-04-27
Payer: COMMERCIAL

## 2023-04-27 NOTE — TELEPHONE ENCOUNTER
----- Message from Debby Galeana sent at 4/27/2023  3:11 PM CDT -----  Regarding: patient call back  Type: Patient Call Back    Who called:  Self   What is the request in detail: Asked for the nurse to call about a doctors note    Can the clinic reply by [facility]? No     Would the patient rather a call back or a response via [facility]? Call     Best call back number: [phone]

## 2023-04-27 NOTE — TELEPHONE ENCOUNTER
----- Message from Yenifer Santiago sent at 4/27/2023  3:46 PM CDT -----  Type: Patient Call Back    Who called:pt     What is the request in detail:returning a call     Can the clinic reply by [facility]?no     Would the patient rather a call back or a response via [facility]?call     Best call back number: [phone]      Additional Information:

## 2023-05-30 ENCOUNTER — PATIENT MESSAGE (OUTPATIENT)
Dept: FAMILY MEDICINE | Facility: CLINIC | Age: 23
End: 2023-05-30
Payer: COMMERCIAL

## 2023-05-30 DIAGNOSIS — E66.01 SEVERE OBESITY: Primary | ICD-10-CM

## 2023-06-13 ENCOUNTER — TELEPHONE (OUTPATIENT)
Dept: FAMILY MEDICINE | Facility: CLINIC | Age: 23
End: 2023-06-13
Payer: COMMERCIAL

## 2023-06-13 ENCOUNTER — PATIENT MESSAGE (OUTPATIENT)
Dept: FAMILY MEDICINE | Facility: CLINIC | Age: 23
End: 2023-06-13
Payer: COMMERCIAL

## 2023-06-13 NOTE — TELEPHONE ENCOUNTER
Dr. Chris Rendon is out of town, I'm a covering for him.      Please let patient know that HepB antibodies can wear off over time (many years). There are two-shot versions and three-shot versions of Hep B vaccines. I see that she last had her two Hep B shots in 2021, so I'm not sure where she sees that she needs to have more Hep B shots.

## 2023-06-22 ENCOUNTER — OFFICE VISIT (OUTPATIENT)
Dept: OBSTETRICS AND GYNECOLOGY | Facility: CLINIC | Age: 23
End: 2023-06-22
Payer: COMMERCIAL

## 2023-06-22 VITALS
BODY MASS INDEX: 34 KG/M2 | HEIGHT: 62 IN | SYSTOLIC BLOOD PRESSURE: 112 MMHG | DIASTOLIC BLOOD PRESSURE: 68 MMHG | WEIGHT: 184.75 LBS

## 2023-06-22 DIAGNOSIS — R39.9 URINARY SYMPTOM OR SIGN: ICD-10-CM

## 2023-06-22 DIAGNOSIS — N76.0 VAGINITIS AND VULVOVAGINITIS: Primary | ICD-10-CM

## 2023-06-22 LAB
BILIRUB SERPL-MCNC: NORMAL MG/DL
BLOOD URINE, POC: NORMAL
CLARITY, POC UA: CLEAR
COLOR, POC UA: YELLOW
GLUCOSE UR QL STRIP: NORMAL
KETONES UR QL STRIP: NORMAL
LEUKOCYTE ESTERASE URINE, POC: NORMAL
NITRITE, POC UA: NORMAL
PH, POC UA: 6
PROTEIN, POC: NORMAL
SPECIFIC GRAVITY, POC UA: 1.01
UROBILINOGEN, POC UA: NORMAL

## 2023-06-22 PROCEDURE — 81514 NFCT DS BV&VAGINITIS DNA ALG: CPT | Performed by: STUDENT IN AN ORGANIZED HEALTH CARE EDUCATION/TRAINING PROGRAM

## 2023-06-22 PROCEDURE — 1159F PR MEDICATION LIST DOCUMENTED IN MEDICAL RECORD: ICD-10-PCS | Mod: CPTII,S$GLB,, | Performed by: STUDENT IN AN ORGANIZED HEALTH CARE EDUCATION/TRAINING PROGRAM

## 2023-06-22 PROCEDURE — 87591 N.GONORRHOEAE DNA AMP PROB: CPT | Performed by: STUDENT IN AN ORGANIZED HEALTH CARE EDUCATION/TRAINING PROGRAM

## 2023-06-22 PROCEDURE — 3008F PR BODY MASS INDEX (BMI) DOCUMENTED: ICD-10-PCS | Mod: CPTII,S$GLB,, | Performed by: STUDENT IN AN ORGANIZED HEALTH CARE EDUCATION/TRAINING PROGRAM

## 2023-06-22 PROCEDURE — 3074F SYST BP LT 130 MM HG: CPT | Mod: CPTII,S$GLB,, | Performed by: STUDENT IN AN ORGANIZED HEALTH CARE EDUCATION/TRAINING PROGRAM

## 2023-06-22 PROCEDURE — 3078F PR MOST RECENT DIASTOLIC BLOOD PRESSURE < 80 MM HG: ICD-10-PCS | Mod: CPTII,S$GLB,, | Performed by: STUDENT IN AN ORGANIZED HEALTH CARE EDUCATION/TRAINING PROGRAM

## 2023-06-22 PROCEDURE — 81002 POCT URINE DIPSTICK WITHOUT MICROSCOPE: ICD-10-PCS | Mod: S$GLB,,, | Performed by: STUDENT IN AN ORGANIZED HEALTH CARE EDUCATION/TRAINING PROGRAM

## 2023-06-22 PROCEDURE — 3008F BODY MASS INDEX DOCD: CPT | Mod: CPTII,S$GLB,, | Performed by: STUDENT IN AN ORGANIZED HEALTH CARE EDUCATION/TRAINING PROGRAM

## 2023-06-22 PROCEDURE — 1159F MED LIST DOCD IN RCRD: CPT | Mod: CPTII,S$GLB,, | Performed by: STUDENT IN AN ORGANIZED HEALTH CARE EDUCATION/TRAINING PROGRAM

## 2023-06-22 PROCEDURE — 99214 OFFICE O/P EST MOD 30 MIN: CPT | Mod: S$GLB,,, | Performed by: STUDENT IN AN ORGANIZED HEALTH CARE EDUCATION/TRAINING PROGRAM

## 2023-06-22 PROCEDURE — 81002 URINALYSIS NONAUTO W/O SCOPE: CPT | Mod: S$GLB,,, | Performed by: STUDENT IN AN ORGANIZED HEALTH CARE EDUCATION/TRAINING PROGRAM

## 2023-06-22 PROCEDURE — 3074F PR MOST RECENT SYSTOLIC BLOOD PRESSURE < 130 MM HG: ICD-10-PCS | Mod: CPTII,S$GLB,, | Performed by: STUDENT IN AN ORGANIZED HEALTH CARE EDUCATION/TRAINING PROGRAM

## 2023-06-22 PROCEDURE — 3078F DIAST BP <80 MM HG: CPT | Mod: CPTII,S$GLB,, | Performed by: STUDENT IN AN ORGANIZED HEALTH CARE EDUCATION/TRAINING PROGRAM

## 2023-06-22 PROCEDURE — 99214 PR OFFICE/OUTPT VISIT, EST, LEVL IV, 30-39 MIN: ICD-10-PCS | Mod: S$GLB,,, | Performed by: STUDENT IN AN ORGANIZED HEALTH CARE EDUCATION/TRAINING PROGRAM

## 2023-06-22 RX ORDER — FLUCONAZOLE 150 MG/1
150 TABLET ORAL ONCE
Qty: 1 TABLET | Refills: 1 | Status: SHIPPED | OUTPATIENT
Start: 2023-06-22 | End: 2023-06-22

## 2023-06-22 NOTE — PATIENT INSTRUCTIONS
Make sure the probiotic contains L. Acidophilus, L. Rhamnosus, and L. Fermentum. Suggested brands include:  - Natural Factors Ultimate Probiotic, Women's Formula  - Provella  - Rodriguez Ultra FemFlora

## 2023-06-22 NOTE — PROGRESS NOTES
[facility]      SUBJECTIVE:     Chief Complaint: Vulvar Itch       History of Present Illness:    Pt presents for vaginal itching. For the past 5 days. External itching. No discharge. Vagisil helps. No new sex partners. No new hygeine routines/products. Showers and bathes. Uses unscented soaps/detergents.  Also for a few months she has been experiencing difficult to start the flow of urine. Painful if it doesn't start. No bleeding. No fevers. Normal bowels    Review of patient's allergies indicates:  No Known Allergies    Past Medical History:   Diagnosis Date    Factor 5 Leiden mutation, heterozygous 2016    Seizures     1 at the age of 10     Past Surgical History:   Procedure Laterality Date    TONSILLECTOMY  6 years old     OB History          0    Para   0    Term   0       0    AB   0    Living   0         SAB   0    IAB   0    Ectopic   0    Multiple   0    Live Births   0               Family History   Problem Relation Age of Onset    Hypertension Mother     Irregular menses Mother     Factor V Leiden deficiency Mother     Interstitial cystitis Mother     No Known Problems Father     Factor V Leiden deficiency Sister     Interstitial cystitis Maternal Aunt     Factor V Leiden deficiency Maternal Grandmother     Thyroid disease Maternal Grandmother         hypothyroidism    Mitral valve prolapse Maternal Grandfather     Congenital heart disease Maternal Grandfather         valve    Crohn's disease Paternal Grandmother     Breast cancer Neg Hx     Colon cancer Neg Hx     Ovarian cancer Neg Hx     Arrhythmia Neg Hx     Early death Neg Hx     Heart attacks under age 50 Neg Hx     Pacemaker/defibrilator Neg Hx      Social History     Tobacco Use    Smoking status: Never    Smokeless tobacco: Never   Substance Use Topics    Alcohol use: No    Drug use: No       Current Outpatient Medications   Medication Sig    cholecalciferol, vitamin D3, 1,250  mcg (50,000 unit) capsule Take 1 capsule (50,000 Units total) by mouth every 7 days.    dextroamphetamine-amphetamine 10 mg Tab Take 1 tablet (10 mg total) by mouth 2 (two) times a day.    tirzepatide 7.5 mg/0.5 mL PnIj Inject 7.5 mg into the skin every 7 days.    acetaminophen (TYLENOL) 500 MG tablet Take 500 mg by mouth every 8 (eight) hours as needed for Pain.    clindamycin phosphate 1% (CLINDAGEL) 1 % gel Apply topically 2 (two) times daily.    dextroamphetamine-amphetamine 10 mg Tab Take 1 tablet (10 mg total) by mouth 2 (two) times a day.    dextroamphetamine-amphetamine 10 mg Tab Take 1 tablet (10 mg total) by mouth 2 (two) times a day.    EScitalopram oxalate (LEXAPRO) 10 MG tablet Take 1 tablet (10 mg total) by mouth once daily.    ibuprofen (ADVIL,MOTRIN) 400 MG tablet Take 400 mg by mouth every 4 (four) hours.    LIDOcaine (XYLOCAINE) 5 % Oint ointment Apply topically as needed (apply to affected area 2x daily as needed).    loratadine (CLARITIN) 10 mg tablet Take 1 tablet (10 mg total) by mouth daily as needed for Allergies (or runny nose).    valACYclovir (VALTREX) 1000 MG tablet Take 1 tablet (1,000 mg total) by mouth every 12 (twelve) hours. for 7 days     No current facility-administered medications for this visit.         Review of Systems:  Review of Systems   Constitutional:  Negative for chills and fever.   Gastrointestinal:  Negative for abdominal pain, nausea and vomiting.   Endocrine: Negative for diabetes.   Genitourinary:  Positive for dysuria and vaginal pain. Negative for dyspareunia, genital sores, hematuria, pelvic pain, vaginal bleeding, vaginal discharge, postcoital bleeding, vaginal dryness and vaginal odor.   Musculoskeletal:  Negative for arthralgias and back pain.      OBJECTIVE:     Physical Exam:  Physical Exam  Vitals reviewed.   Constitutional:       General: She is not in acute distress.     Appearance: She is well-developed. She is not diaphoretic.   HENT:       Head: Normocephalic and atraumatic.   Eyes:      Conjunctiva/sclera: Conjunctivae normal.   Cardiovascular:      Rate and Rhythm: Normal rate.   Pulmonary:      Effort: Pulmonary effort is normal.   Abdominal:      General: There is no distension.      Palpations: Abdomen is soft. There is no mass.      Tenderness: There is no abdominal tenderness. There is no guarding or rebound.   Genitourinary:     Labia:         Right: No rash, tenderness, lesion or injury.         Left: No rash, tenderness, lesion or injury.       Vagina: No signs of injury and foreign body. No vaginal discharge, erythema, tenderness or bleeding.      Cervix: No cervical motion tenderness, discharge or friability.      Uterus: Not deviated, not enlarged, not fixed and not tender.       Adnexa:         Right: No mass, tenderness or fullness.          Left: No mass, tenderness or fullness.     Musculoskeletal:         General: Normal range of motion.      Cervical back: Normal range of motion.   Skin:     General: Skin is warm and dry.   Neurological:      Mental Status: She is alert and oriented to person, place, and time.       ASSESSMENT:     No diagnosis found.       Plan:        Vaginal discomfort  - GC/CT and affirm collected  - Proper hygiene routines/products discussed. Probiotics discussed  - Will send diflucan based on suspicion    Urinary issue  -Udip negative  -consider PFPT vs urogyn referral if swabs negative    Face to Face time with patient: 20    30 minutes of total time spent on the encounter, which includes face to face time and non-face to face time preparing to see the patient (eg, review of tests), Obtaining and/or reviewing separately obtained history, Documenting clinical information in the electronic or other health record, Independently interpreting results (not separately reported) and communicating results to the patient/family/caregiver, or Care coordination (not separately reported).        Shayy Werner

## 2023-06-23 LAB
BACTERIAL VAGINOSIS DNA: NEGATIVE
C TRACH DNA SPEC QL NAA+PROBE: NOT DETECTED
CANDIDA GLABRATA DNA: NEGATIVE
CANDIDA KRUSEI DNA: NEGATIVE
CANDIDA RRNA VAG QL PROBE: NEGATIVE
N GONORRHOEA DNA SPEC QL NAA+PROBE: NOT DETECTED
T VAGINALIS RRNA GENITAL QL PROBE: NEGATIVE

## 2023-07-26 ENCOUNTER — PATIENT MESSAGE (OUTPATIENT)
Dept: FAMILY MEDICINE | Facility: CLINIC | Age: 23
End: 2023-07-26
Payer: COMMERCIAL

## 2023-07-26 DIAGNOSIS — E66.01 SEVERE OBESITY: ICD-10-CM

## 2023-08-10 ENCOUNTER — OFFICE VISIT (OUTPATIENT)
Dept: FAMILY MEDICINE | Facility: CLINIC | Age: 23
End: 2023-08-10
Payer: COMMERCIAL

## 2023-08-10 DIAGNOSIS — K52.9 GASTROENTERITIS: ICD-10-CM

## 2023-08-10 DIAGNOSIS — E66.01 SEVERE OBESITY (BMI 35.0-39.9) WITH COMORBIDITY: ICD-10-CM

## 2023-08-10 DIAGNOSIS — R11.2 NAUSEA AND VOMITING, UNSPECIFIED VOMITING TYPE: Primary | ICD-10-CM

## 2023-08-10 PROCEDURE — 99213 PR OFFICE/OUTPT VISIT, EST, LEVL III, 20-29 MIN: ICD-10-PCS | Mod: 95,,, | Performed by: NURSE PRACTITIONER

## 2023-08-10 PROCEDURE — 1159F PR MEDICATION LIST DOCUMENTED IN MEDICAL RECORD: ICD-10-PCS | Mod: CPTII,95,, | Performed by: NURSE PRACTITIONER

## 2023-08-10 PROCEDURE — 1160F RVW MEDS BY RX/DR IN RCRD: CPT | Mod: CPTII,95,, | Performed by: NURSE PRACTITIONER

## 2023-08-10 PROCEDURE — 1160F PR REVIEW ALL MEDS BY PRESCRIBER/CLIN PHARMACIST DOCUMENTED: ICD-10-PCS | Mod: CPTII,95,, | Performed by: NURSE PRACTITIONER

## 2023-08-10 PROCEDURE — 99213 OFFICE O/P EST LOW 20 MIN: CPT | Mod: 95,,, | Performed by: NURSE PRACTITIONER

## 2023-08-10 PROCEDURE — 1159F MED LIST DOCD IN RCRD: CPT | Mod: CPTII,95,, | Performed by: NURSE PRACTITIONER

## 2023-08-10 RX ORDER — ONDANSETRON 4 MG/1
4 TABLET, ORALLY DISINTEGRATING ORAL 2 TIMES DAILY
Qty: 20 TABLET | Refills: 0 | Status: SHIPPED | OUTPATIENT
Start: 2023-08-10 | End: 2023-08-20

## 2023-08-10 NOTE — PROGRESS NOTES
Assessment & Plan  Problem List Items Addressed This Visit          Endocrine    Severe obesity (BMI 35.0-39.9) with comorbidity     Other Visit Diagnoses       Nausea and vomiting, unspecified vomiting type    -  Primary    Relevant Medications    ondansetron (ZOFRAN-ODT) 4 MG TbDL    Gastroenteritis              Will prescribe some zofran  Talked about importance of fluid rehydration  If continues to have N/V with out being able to tolerate fluids patient is advised to go to the ED  Patient verbalized understanding      Health Maintenance reviewed, yes.    The patient location is:  Patient Home   The chief complaint leading to consultation is: noted below  Visit type: Virtual visit with synchronous audio and video  Total time spent with patient approximately 10 minutes including chart review  Each patient to whom he or she provides medical services by telemedicine is:  (1) informed of the relationship between the physician and patient and the respective role of any other health care provider with respect to management of the patient; and (2) notified that he or she may decline to receive medical services by telemedicine and may withdraw from such care at any time.    Follow-up: Follow up if symptoms worsen or fail to improve.    ______________________________________________________________________    Chief Complaint  Chief Complaint   Patient presents with    Nausea       HPI  Zeny Charles is a 22 y.o. female with multiple medical diagnoses as listed in the medical history and problem list that presents for nausea and vomiting via virtual visit.  Pt is known to me with their last appointment Visit date not found.      The patient states that she has been having nausea and vomiting since this morning around 5 and has not been able to keep anything down since then. She can not even keep water down. She denies any stomach pain or cramping, or diarrhea and has no other symptoms at this time. She states that she had  some [place] Trevon's yesterday that she has not had in while. Denies any sick contacts..    Of note she is on tirzepatide and recently had an increase in dose.      PAST MEDICAL HISTORY:  Past Medical History:   Diagnosis Date    Factor 5 Leiden mutation, heterozygous 9/13/2016    Seizures     1 at the age of 10       PAST SURGICAL HISTORY:  Past Surgical History:   Procedure Laterality Date    TONSILLECTOMY  6 years old       SOCIAL HISTORY:  Social History     Socioeconomic History    Marital status: Single   Tobacco Use    Smoking status: Never    Smokeless tobacco: Never   Substance and Sexual Activity    Alcohol use: No    Drug use: No    Sexual activity: Never   Social History Narrative    In 12th grade. Lives with Mom and Dad. No smokers. No alcohol, tobacco, illicit drugs. 1 dog.        FAMILY HISTORY:  Family History   Problem Relation Age of Onset    Hypertension Mother     Irregular menses Mother     Factor V Leiden deficiency Mother     Interstitial cystitis Mother     No Known Problems Father     Factor V Leiden deficiency Sister     Interstitial cystitis Maternal Aunt     Factor V Leiden deficiency Maternal Grandmother     Thyroid disease Maternal Grandmother         hypothyroidism    Mitral valve prolapse Maternal Grandfather     Congenital heart disease Maternal Grandfather         valve    Crohn's disease Paternal Grandmother     Breast cancer Neg Hx     Colon cancer Neg Hx     Ovarian cancer Neg Hx     Arrhythmia Neg Hx     Early death Neg Hx     Heart attacks under age 50 Neg Hx     Pacemaker/defibrilator Neg Hx        ALLERGIES AND MEDICATIONS: updated and reviewed.  Review of patient's allergies indicates:  No Known Allergies  Current Outpatient Medications   Medication Sig Dispense Refill    cholecalciferol, vitamin D3, 1,250 mcg (50,000 unit) capsule Take 1 capsule (50,000 Units total) by mouth every 7 days. 12 capsule 0    dextroamphetamine-amphetamine 10 mg Tab Take 1 tablet (10 mg total) by  mouth 2 (two) times a day. 60 tablet 0    ondansetron (ZOFRAN-ODT) 4 MG TbDL Take 1 tablet (4 mg total) by mouth 2 (two) times daily. for 10 days 20 tablet 0    tirzepatide 7.5 mg/0.5 mL PnIj Inject 7.5 mg into the skin every 7 days. 4 pen 3     No current facility-administered medications for this visit.         ROS  Review of Systems   Constitutional:  Negative for activity change and unexpected weight change.   HENT:  Negative for hearing loss, rhinorrhea and trouble swallowing.    Eyes:  Negative for discharge and visual disturbance.   Respiratory:  Negative for chest tightness and wheezing.    Cardiovascular:  Negative for chest pain and palpitations.   Gastrointestinal:  Positive for vomiting. Negative for blood in stool, constipation and diarrhea.   Endocrine: Negative for polydipsia and polyuria.   Genitourinary:  Negative for difficulty urinating, dysuria, hematuria and menstrual problem.   Musculoskeletal:  Negative for arthralgias, joint swelling and neck pain.   Neurological:  Positive for headaches. Negative for weakness.   Psychiatric/Behavioral:  Negative for confusion and dysphoric mood.            Physical Exam  Physical Exam  Constitutional:       Appearance: She is ill-appearing.   Pulmonary:      Effort: Pulmonary effort is normal. No respiratory distress.   Musculoskeletal:      Cervical back: Normal range of motion.   Neurological:      Mental Status: She is alert.     PE limited due to virtual visit type.      Health Maintenance         Date Due Completion Date    Pneumococcal Vaccines (Age 0-64) (1 - PCV) 08/27/2008 10/24/2003    COVID-19 Vaccine (4 - Pfizer series) 05/12/2022 3/17/2022    TETANUS VACCINE 08/27/2023 8/27/2013    Influenza Vaccine (1) 09/01/2023 11/20/2021    Chlamydia Screening 06/22/2024 6/22/2023          Health maintenance reviewed at this time.

## 2023-08-13 ENCOUNTER — PATIENT MESSAGE (OUTPATIENT)
Dept: FAMILY MEDICINE | Facility: CLINIC | Age: 23
End: 2023-08-13
Payer: COMMERCIAL

## 2023-08-13 DIAGNOSIS — Z11.1 SCREENING-PULMONARY TB: Primary | ICD-10-CM

## 2023-08-16 ENCOUNTER — LAB VISIT (OUTPATIENT)
Dept: LAB | Facility: HOSPITAL | Age: 23
End: 2023-08-16
Attending: FAMILY MEDICINE
Payer: COMMERCIAL

## 2023-08-16 DIAGNOSIS — Z11.1 SCREENING-PULMONARY TB: ICD-10-CM

## 2023-08-16 PROCEDURE — 86480 TB TEST CELL IMMUN MEASURE: CPT | Performed by: FAMILY MEDICINE

## 2023-08-17 LAB
GAMMA INTERFERON BACKGROUND BLD IA-ACNC: 0.03 IU/ML
M TB IFN-G CD4+ BCKGRND COR BLD-ACNC: 0.02 IU/ML
M TB IFN-G CD4+ BCKGRND COR BLD-ACNC: 0.04 IU/ML
MITOGEN IGNF BCKGRD COR BLD-ACNC: 1.77 IU/ML
TB GOLD PLUS: NEGATIVE

## 2023-08-18 ENCOUNTER — PATIENT MESSAGE (OUTPATIENT)
Dept: FAMILY MEDICINE | Facility: CLINIC | Age: 23
End: 2023-08-18
Payer: COMMERCIAL

## 2023-08-21 ENCOUNTER — TELEPHONE (OUTPATIENT)
Dept: FAMILY MEDICINE | Facility: CLINIC | Age: 23
End: 2023-08-21
Payer: COMMERCIAL

## 2023-08-21 NOTE — TELEPHONE ENCOUNTER
Attempted to contact patient. Left a voice mail to call clinic back. Patient due for tdap 08/27/23.

## 2023-08-21 NOTE — TELEPHONE ENCOUNTER
----- Message from Paras Mario Alberto sent at 8/21/2023 10:22 AM CDT -----  Regarding: Self [phone]  Type: Patient Call Back    Who called: Self     What is the request in detail: called to get a TDAP vaccine scheduled. Would like a call from the office if able to.     Can the clinic reply by [facility]? No     Would the patient rather a call back or a response via [facility]? Call back     Best call back number: [phone]     Additional Information:    Thank you.

## 2023-08-21 NOTE — TELEPHONE ENCOUNTER
----- Message from Rishi Cedeno sent at 8/21/2023  4:04 PM CDT -----  Regarding: Zeny  Type:  Patient Returning Call     Who Called: Zeny     Who Left Message for Patient: Daryn Rendon     Does the patient know what this is regarding?:No     Would the patient rather a call back or a response via [facility]?  Callback     Best Call Back Number:.[phone]       Additional Information:

## 2023-08-22 ENCOUNTER — TELEPHONE (OUTPATIENT)
Dept: FAMILY MEDICINE | Facility: CLINIC | Age: 23
End: 2023-08-22
Payer: COMMERCIAL

## 2023-09-11 ENCOUNTER — OFFICE VISIT (OUTPATIENT)
Dept: FAMILY MEDICINE | Facility: CLINIC | Age: 23
End: 2023-09-11
Payer: COMMERCIAL

## 2023-09-11 ENCOUNTER — PATIENT MESSAGE (OUTPATIENT)
Dept: FAMILY MEDICINE | Facility: CLINIC | Age: 23
End: 2023-09-11

## 2023-09-11 DIAGNOSIS — J06.9 UPPER RESPIRATORY TRACT INFECTION, UNSPECIFIED TYPE: Primary | ICD-10-CM

## 2023-09-11 DIAGNOSIS — E66.01 SEVERE OBESITY (BMI 35.0-39.9) WITH COMORBIDITY: ICD-10-CM

## 2023-09-11 DIAGNOSIS — E66.09 CLASS 2 OBESITY DUE TO EXCESS CALORIES WITHOUT SERIOUS COMORBIDITY WITH BODY MASS INDEX (BMI) OF 37.0 TO 37.9 IN ADULT: ICD-10-CM

## 2023-09-11 PROCEDURE — 99213 PR OFFICE/OUTPT VISIT, EST, LEVL III, 20-29 MIN: ICD-10-PCS | Mod: 95,,, | Performed by: NURSE PRACTITIONER

## 2023-09-11 PROCEDURE — 99213 OFFICE O/P EST LOW 20 MIN: CPT | Mod: 95,,, | Performed by: NURSE PRACTITIONER

## 2023-09-11 RX ORDER — FLUTICASONE PROPIONATE 50 MCG
2 SPRAY, SUSPENSION (ML) NASAL DAILY
Qty: 11.1 ML | Refills: 0 | Status: SHIPPED | OUTPATIENT
Start: 2023-09-11 | End: 2024-01-18

## 2023-09-11 RX ORDER — BENZOCAINE AND MENTHOL, UNSPECIFIED FORM 15; 20 MG/1; MG/1
1 LOZENGE ORAL
Qty: 16 LOZENGE | Refills: 1 | Status: SHIPPED | OUTPATIENT
Start: 2023-09-11 | End: 2024-01-18

## 2023-09-11 RX ORDER — CETIRIZINE HYDROCHLORIDE 10 MG/1
10 TABLET ORAL DAILY
Qty: 10 TABLET | Refills: 0 | Status: SHIPPED | OUTPATIENT
Start: 2023-09-11 | End: 2024-01-18

## 2023-09-11 RX ORDER — BENZONATATE 200 MG/1
200 CAPSULE ORAL 3 TIMES DAILY PRN
Qty: 15 CAPSULE | Refills: 0 | Status: SHIPPED | OUTPATIENT
Start: 2023-09-11 | End: 2024-01-18

## 2023-09-11 NOTE — PATIENT INSTRUCTIONS
Medical Fitness--[phone]  Imaging, Xray, CT, MRI, Ultrasound---[phone]  Bariatrics---[phone]  Breast Surgery---[phone]  Case Management---[phone]  Colonoscopy---[phone]  DME---[phone]  Infectious Disease---[phone]  Interventional Radiology---[phone]  Medical Records---[phone]  [facility]---[phone]  Optometry/Ophthalmology---[phone]  [facility]---[phone]  Physical Therapy---[phone]  Psychiatry---[phone] or [phone]  Plastic Surgery---[phone]  Recovery--[phone] option 2, or [phone].  Sleep Study---[phone]  Smoking Cessation---[phone]  Wound Care---[phone]  Referral Desk---[phone]    Patient Education       Viral Upper Respiratory Infection Discharge Instructions, Adult   About this topic   You have an upper respiratory infection or URI. A URI can affect your nose, throat, ears, and sinuses. A virus is the cause of almost all URIs and antibiotics will not help you feel better more quickly. The common cold is an example of a viral URI.  URIs are easy to spread from person to person, most often through coughing or sneezing. A URI will almost always get better in a week or two without any treatment.         What care is needed at home?   Ask your doctor what you need to do when you go home. Make sure you ask questions if you do not understand what the doctor says.  If you smoke, try to quit. Your doctor or nurse can help.  Drink lots of fluids like water, juice, or broth. This will help replace any fluids lost if you have a runny nose or fever. Warm tea or soup can help soothe a sore throat.  If the air in your home feels dry, use a cool mist humidifier. This can help a stuffy nose and make it easier to breathe.  You can also use saline nose drops to relieve stuffiness.  If you decide to take over-the-counter cough or cold medicines, follow the directions on the label carefully. Be sure you do not take more than  1 medicine that contains acetaminophen. Also, if you have a heart problem or high blood pressure, check with your doctor before you take any of these medicines.  Wash your hands often. Cough or sneeze into a tissue or your elbow instead of your hands. This will help keep others healthy.  What follow-up care is needed?   Your doctor may ask you to make visits to the office to check on your progress. Be sure to keep these visits.  What drugs may be needed?   The doctor may order drugs to:  Open up the tubes of your lungs  Treat viral infection  Relieve or stop coughing  Help with pain from a sore throat  Relieve runny and stuffy nose  Provide oxygen  Will physical activity be limited?   You need to rest for a few days to let your body recover from the infection.  What changes to diet are needed?   Eat soft foods like soup if swallowing is too painful.  What problems could happen?   Asthma attack  Sinus infections  Lung problems like pneumonia and bronchitis  Severe fluid loss. This is dehydration.  What can be done to prevent this health problem?   Wash your hands often with soap and water for at least 20 seconds, especially after coughing or sneezing. Alcohol-based hand sanitizers also work to kill the virus.  If you are sick, cover your mouth and nose with tissue when you cough or sneeze. You can also cough into your elbow. Throw away tissues in the trash and wash your hands after touching used tissues.  Do not get too close (kissing, hugging) to people who are sick.  Do not share towels or hankies with anyone who is sick. Clean commonly handled things like door handles, remotes, toys, and phones. Wipe them with a disinfectant.  Stay away from crowded places.  Cover your nose and mouth when you sneeze or cough.  Take vitamin C to help build up your body's ability to fight disease.  Get a flu shot each year.  When do I need to call the doctor?   You have trouble breathing when talking or sitting still.  You have a  fever of 100.4°F (38°C) or higher for several days, chills, a very bad sore throat, or ear or sinus pain.  You develop a new fever after several days of feeling the same or improving.  You develop chest pain when you cough.  You have a cough that lasts more than 10 days.  You cough up blood, or the color of the mucus you cough up changes.  Teach Back: Helping You Understand   The Teach Back Method helps you understand the information we are giving you. After you talk with the staff, tell them in your own words what you learned. This helps to make sure the staff has described each thing clearly. It also helps to explain things that may have been confusing. Before going home, make sure you can do these:  I can tell you about my condition.  I can tell you what may help ease my signs.  I can tell you what I will do if I have a fever, chills, breathing very fast, or trouble breathing.  Where can I learn more?   American Lung Association  https://www.lung.org/blog/can-you-exercise-with-a-cold   American Lung Association  https://www.lung.org/lung-health-diseases/lung-disease-lookup/influenza/facts-about-the-common-cold   NHS Choices  https://www.nhs.uk/conditions/respiratory-tract-infection/   UpToDate  https://www.uptodate.com/contents/the-common-cold-in-adults-beyond-the-basics   Last Reviewed Date   2021-06-08  Consumer Information Use and Disclaimer   This information is not specific medical advice and does not replace information you receive from your health care provider. This is only a brief summary of general information. It does NOT include all information about conditions, illnesses, injuries, tests, procedures, treatments, therapies, discharge instructions or life-style choices that may apply to you. You must talk with your health care provider for complete information about your health and treatment options. This information should not be used to decide whether or not to accept your health care providers  advice, instructions or recommendations. Only your health care provider has the knowledge and training to provide advice that is right for you.  Copyright   Copyright © 2021 UpToDate, Inc. and its affiliates and/or licensors. All rights reserved.  Patient Education       Sinusitis Discharge Instructions, Adult   About this topic   Your sinuses are hollow areas in the bones of your face. They have a thin lining that normally makes a small amount of mucus. When you have sinusitis, the lining gets swollen and makes extra mucus. You may have sinusitis with or after a cold. Most of the time sinusitis will get better in 1 to 2 weeks.  Sinusitis is most often caused by a virus, so antibiotics wont help. But some people do need antibiotics. If the doctor ordered antibiotics for you, be sure to follow the instructions. It is important to take all of your antibiotics even if you start to feel better.       What care is needed at home?   Ask your doctor what you need to do when you go home. Make sure you ask questions if you do not understand what you need to do.  Try to thin the mucus.  Drink lots of liquids to stay hydrated.  Use a cool mist humidifier to avoid dry air.  Use saline nose drops or a saline nose rinse to relieve stuffiness.  Wash your hands often. This will help keep others healthy.  Do not smoke or be in smoke-filled places. Avoid things that may cause breathing problems like fumes, pollution, dust, and other common allergens and irritants.  You may want to take medicine like ibuprofen, naproxen, or acetaminophen to help with pain.  What follow-up care is needed?   Your doctor may ask you to make visits to the office to check on your progress. Be sure to keep your visits.  Your doctor will tell you if other tests are needed.  Your doctor may send you for allergy tests or to an allergy expert.   What lifestyle changes are needed?   Avoid drinks that contain caffeine or alcohol.  Try to stop smoking. Avoid  being around others who smoke. Smoke can damage the small hairs inside your sinuses.  What drugs may be needed?   The doctor may order drugs to:  Help with pain and swelling  Fight an infection  Control coughing  Dry up the sinuses  Help a runny or stuffy nose  Will physical activity be limited?   You do not have to limit your activity. You may want to rest more if you have a fever or headache.   What problems could happen?   Infections that happen again and again  Asthma attack  Coughing  Loss of voice  What can be done to prevent this health problem?   Keep your nose as moist as possible. Use saline sprays, washes, and a humidifier often.  Avoid being around cigarette and cigar smoke or strong odors from chemicals.  Avoid long periods of swimming in pools treated with chlorine. This can bother the lining of the nose and sinuses.  Avoid water diving. This forces water into the sinuses from the nasal passages.  Manage your allergies with your doctor's help.  Use an air conditioner if allergies are a problem.  Before air travel, use a drug to dry up mucus. As the plane takes off or lands, the pressure can cause sinus pain.  When do I need to call the doctor?   You have a stiff neck, especially if you also have fever, chills, vomiting, or severe headache  You have trouble thinking clearly.  You have trouble seeing or have double vision.  You have swelling or redness or pain around one or both eyes.  You have a fever of 102°F (38.9°C) or higher, or have shaking chills or sweats.  You have an upset stomach and throwing up.  You have more pain in your face and head.  You are not getting better within 1 to 2 weeks.  Teach Back: Helping You Understand   The Teach Back Method helps you understand the information we are giving you. After you talk with the staff, tell them in your own words what you learned. This helps to make sure the staff has covered each thing clearly. It also helps to explain things that may have been  "confusing. Before going home, make sure you can do these:  I can tell you about my condition.  I can tell you what may help ease my breathing.  I can tell you what I will do if I have a fever, chills, or trouble breathing.  Where can I learn more?   American Academy of Family Physicians  https://familydoctor.org/condition/sinusitis/   Centers for Disease Control and Prevention  https://www.cdc.gov/antibiotic-use/community/for-hcp/outpatient-hcp/adult-treatment-rec.html   Last Reviewed Date   2021-06-09  Consumer Information Use and Disclaimer   This information is not specific medical advice and does not replace information you receive from your health care provider. This is only a brief summary of general information. It does NOT include all information about conditions, illnesses, injuries, tests, procedures, treatments, therapies, discharge instructions or life-style choices that may apply to you. You must talk with your health care provider for complete information about your health and treatment options. This information should not be used to decide whether or not to accept your health care providers advice, instructions or recommendations. Only your health care provider has the knowledge and training to provide advice that is right for you.  Copyright   Copyright © 2021 UpToDate, Inc. and its affiliates and/or licensors. All rights reserved.  Patient Education       COVID-19 Overview   The Basics   Written by the doctors and editors at [facility]   View in ItalianView in [place] PortugueseView in GermanView in JapaneseView in FrenchView in SpanishView video in [place]   What is COVID-19?   COVID-19 stands for "coronavirus disease 2019." It is caused by a virus called SARS-CoV-2. The virus first appeared in late 2019 and quickly spread around the world.  What are the symptoms of COVID-19?   Symptoms usually start 4 or 5 days after a person is infected with the virus. But in some people, it can take up to 2 " "weeks for symptoms to appear. Some people never show symptoms at all.  When symptoms do happen, they can include:  Fever  Cough  Trouble breathing  Feeling tired  Shaking chills  Muscle aches  Headache  Sore throat  Problems with sense of smell or taste  Some people have digestive problems like nausea or diarrhea. There have also been some reports of rashes or other skin symptoms. For example, some people with COVID-19 get reddish-purple spots on their fingers or toes. But it's not clear why or how often this happens.  For most people, symptoms will get better within a few weeks. But a small number of people get very sick and stop being able to breathe on their own. In severe cases, their organs stop working, which can lead to death.  Some people with COVID-19 continue to have some symptoms for weeks or months. This seems to be more likely in people who are sick enough to need to stay in the hospital. But this can also happen in people who did not get very sick. Doctors are still learning about the long-term effects of COVID-19.  While children can get COVID-19, they are less likely than adults to have severe symptoms. More information about COVID-19 and children is available separately. (See "Patient education: COVID-19 and children (The Basics)".)  Am I at risk for getting seriously ill?   It depends on your age and health. In some people, COVID-19 leads to serious problems like pneumonia, not getting enough oxygen, heart problems, or even death. This risk gets higher as people get older. It is also higher in people who have other health problems like serious heart disease, chronic kidney disease, type 2 diabetes, chronic obstructive pulmonary disease (COPD), sickle cell disease, or obesity. People who have a weak immune system for other reasons (for example, HIV infection or certain medicines), asthma, cystic fibrosis, type 1 diabetes, or high blood pressure might also be at higher risk for serious problems.  How " "is COVID-19 spread?   The virus that causes COVID-19 mainly spreads from person to person. This usually happens when an infected person coughs, sneezes, or talks near other people. The virus is passed through tiny particles from the infected person's lungs and airway. These particles can easily travel through the air to other people who are nearby. In some cases, like in indoor spaces where the same air keeps being blown around, virus in the particles might be able to spread to other people who are farther away.  The virus can be passed easily between people who live together. But it can also spread at gatherings where people are talking close together, shaking hands, hugging, sharing food, or even singing together. Eating at restaurants raises the risk of infection, since people tend to be close to each other and not covering their faces. Doctors also think it is possible to get infected if you touch a surface that has the virus on it and then touch your mouth, nose, or eyes. However, this is probably not very common.  A person can be infected, and spread the virus to others, even without having any symptoms.  Are there different variants of the virus that causes COVID-19?   Yes. Viruses constantly change or "mutate." When this happens, a new strain or "variant" can form. Most of the time, new variants do not change the way a virus works. But when a variant has changes in important parts of the virus, it can act differently.  Experts have discovered several new variants of the virus that causes COVID-19. Certain variants seem to spread more easily than the original virus. They might also make people sicker.  Experts are studying the different variants. This will help them better understand how far they have spread, whether they affect people differently, and how well different vaccines protect against them.  The more people who get vaccinated against COVID-19, the harder it will be for the virus to form new " "variants.  Is there a test for the virus that causes COVID-19?   Yes. If your doctor or nurse suspects you have COVID-19, they might take a swab from inside your nose or mouth for testing. In some cases, they might take a sample of your saliva. These tests can help your doctor figure out if you have COVID-19 or another illness.  There are 2 types of tests used to diagnose COVID-19:  Molecular tests - These look for the genetic material from the virus. They are also called "nucleic acid tests." You can get a molecular test at a doctor's office, clinic, or pharmacy. There are also places that make these tests available for lots of people, often at drive-through locations. Depending on the lab, it can take up to several days to get test results back.  Molecular tests are the best way to know if a person has COVID-19. That's because they can detect even very low levels of virus in the body.  Antigen tests - These look for proteins from the virus. They can give results faster than most molecular tests. You can do an antigen test at a doctor's office, clinic, pharmacy, or through some organizations that make testing available in other places. You can also do an antigen test at home.  Antigen tests are not as accurate as molecular tests. They are more likely to give "false negative" results. This is when the test comes back negative even though the person actually is infected. But antigen tests can still be useful in some situations, when results are needed quickly or a molecular test is not available. For example, if a person has early symptoms of COVID-19, an antigen test can be accurate enough to detect virus in their body. If a person gets an antigen test and the result is negative, a molecular test might be needed to confirm they do not have the virus in their body. This might be done if the person has symptoms or knows they were exposed the virus.  There is also a blood test that can show if a person has had COVID-19 " "in the past. This is called an "antibody" test. Antibody tests are generally not used on their own to diagnose COVID-19 or make decisions about care. But experts can use them to learn how many people in a certain area were infected without knowing it.  Can COVID-19 be prevented?   The best way to prevent COVID-19 is to get vaccinated. In the [place], the first vaccines became available in late 2020. People age 12 and older can get a vaccine.  If enough people get the vaccine, the virus will stop spreading so quickly. More information about COVID-19 vaccines, including what you can do after being vaccinated, is available separately. (See "Patient education: COVID-19 vaccines (The Basics)".)  Experts believe that vaccines will be one of the most important ways to control the COVID-19 pandemic. People who are fully vaccinated are at much lower risk of getting the virus.  If you are not yet vaccinated, there are other ways to help protect yourself and others:  Practice "social distancing." It's most important to avoid contact with people who are sick. But social distancing also means staying at least 6 feet (about 2 meters) from anyone outside your household. That's because the virus can spread easily through close contact, and it's not always possible to know who is infected.  Wear a face mask when you need to go be in public around other people. This is mostly so that if you are infected, even if you don't have any symptoms, you are less likely to spread the infection to other people. It might also help protect you from others who could be infected. Make sure your mask covers your mouth and nose.  You can buy cloth masks and disposable (non-medical) masks in stores or online. Cloth masks work best if they have several layers of fabric. Your mask should fit snugly over your face with no gaps. You can improve the fit by using a mask with an adjustable nose wire, adjusting or knotting the ear loops to make it " "tighter, or wearing a cloth mask on top of a disposable mask.  When you take your mask off, make sure you do not touch your eyes, nose, or mouth. And wash your hands after you touch the mask. You can wash cloth masks with the rest of your laundry.  When you are outdoors and not around other people, you might not need to wear a mask. But it's important to know what the rules are in your area. The [place] Centers for Disease Control and Prevention (CDC) has more information about how to wear a face mask: www.cdc.gov/coronavirus/2019-ncov/prevent-getting-sick/about-face-coverings.html.  Wash your hands with soap and water often. This is especially important after being out in public or touching surfaces that many other people also touch, like door handles or railings. The risk of getting infected by touching items like this is probably not very high. Still, it's a good idea to wash your hands often. This also helps protect you from other illnesses, like the flu or the common cold.  Make sure to rub your hands with soap for at least 20 seconds, cleaning your wrists, fingernails, and in between your fingers. Then rinse your hands and dry them with a paper towel you can throw away. If you are not near a sink, you can use a hand sanitizing gel to clean your hands. The gels with at least 60 percent alcohol work the best. But it is better to wash with soap and water if you can.  Avoid touching your face, especially your mouth, nose, and eyes.  Avoid or limit traveling if you can. Any form of travel, especially if you spend time in crowded places like airports, increases your risk of getting and spreading infection.  If you do need to travel, be sure to check whether there are any rules about COVID-19 in the area you are visiting. In the [place], some places require people to "self-quarantine" for some length of time if they are visiting (or returning) from another state. This means not going out in public or " "being around other people. The [place] also requires a negative COVID-19 test for anyone who enters, or returns to, the country. Many other countries have testing requirements for visiting, too. All of these rules are meant to help slow the spread of COVID-19.  Once you are fully vaccinated, you are much less likely to get the virus. "Fully vaccinated" means you have had all doses of the vaccine and it has been at least 2 weeks since the last dose. (If you had a single-dose vaccine, you are fully vaccinated 2 weeks after you get the shot.)  What should I do if I have symptoms?   If you have a fever, cough, trouble breathing, or other symptoms of COVID-19, call your doctor or nurse. They will ask about your symptoms. They might also ask about any recent travel and whether you have been around anyone who might have been infected. Then they can tell you if you should come in or go somewhere else to be tested.  If your symptoms are not severe, it is best to call before you go in. The staff can tell you what to do and whether you need to be seen in person. Many people with only mild symptoms should stay home and avoid other people until they get better. If you do need to go to the clinic or hospital, be sure to wear a mask. This helps protect other people. The staff might also have you wait someplace away from other people.  If you are severely ill and need to go to the clinic or hospital right away, you should still call ahead if possible. This way the staff can care for you while taking steps to protect others. If you think you are having a medical emergency, call for an ambulance (in the [place] and [place], dial [id number]).  What if I feel fine but think I was exposed?   If you think you were in close contact with someone with COVID-19, what to do next depends on whether you have already had COVID-19 or gotten the vaccine:  If you have not had COVID-19 or gotten the vaccine - You should get tested after a possible " exposure, even if you don't have any symptoms. Call your doctor or nurse if you aren't sure where to get a test. Then self-quarantine at home and monitor yourself for symptoms. This means staying home as much as possible, and staying at least 6 feet (2 meters) away from other people in your home.  The safest thing to do after a possible exposure is to self-quarantine for 14 days. This can be challenging with work, school, or other responsibilities. Because of this, some public health departments might allow people to stop quarantining sooner, especially if they get a negative test. If you're not sure how long to quarantine for, contact your local public health office or ask your doctor or nurse.  If you have had COVID-19 or gotten the vaccine - If you had COVID-19 within the last 3 months, you do not need to self-quarantine. If you had COVID-19 but it was more than 3 months ago, follow the steps above.  If you are fully vaccinated, you do not need to self-quarantine. But you should still get tested 3 to 5 days after you were in contact with the person who had COVID-19. Even though you are much less likely to get the infection after being vaccinated, it is still possible.  If you self-quarantine for less than 14 days, or if you do not need to self-quarantine, you should still monitor yourself for symptoms for the full 14 days. If you start to have any symptoms, call your doctor or nurse right away. You should also be extra careful about wearing a mask and social distancing during this time.  How is COVID-19 treated?   Many people will be able to stay home while they get better. But people with serious symptoms or other health problems might need to go to the hospital.  Mild illness - Mild illness means you might have symptoms like fever and cough, but you do not have trouble breathing. Most people with COVID-19 have mild illness and can rest at home until they get better. This usually takes about 2 weeks, but it's  "not the same for everyone.  If you are recovering from COVID-19, it's important to stay home and "self-isolate" until your doctor or nurse tells you it's safe to stop. Self-isolation means staying apart from other people, even the people you live with. When you can stop self-isolation will depend on how long it has been since you had symptoms, and in some cases, whether you have had a negative test (showing that the virus is no longer in your body).  Severe illness - If you have more severe illness with trouble breathing, you might need to stay in the hospital, possibly in the intensive care unit (also called the "ICU"). While you are there, you will most likely be in a special isolation room. Only medical staff will be allowed in the room, and they will have to wear special gowns, gloves, masks, and eye protection.  The doctors and nurses can monitor and support your breathing and other body functions and make you as comfortable as possible. You might need extra oxygen to help you breathe easily. If you are having a very hard time breathing, you might need a breathing tube. The tube goes down your throat and into your lungs. It is connected to a machine to help you breathe, called a "ventilator."  Doctors are studying several possible treatments for COVID-19. In certain cases, they might recommend treatments called "monoclonal antibodies." These treatments seem to help some people who are at risk of getting severely ill.  Doctors also might recommend being part of a clinical trial. A clinical trial is a scientific study that tests new medicines to see how well they work. Do not try any new medicines or treatments without talking to a doctor.  What should I do if someone in my home has COVID-19?   If someone in your home has COVID-19, there are additional things you can do to protect yourself and others:  Keep the sick person away from others - The sick person should stay in a separate room, and use a different " "bathroom if possible. They should also eat in their own room.  Experts also recommend that the person stay away from pets in the house until they are better.  Have them wear a mask - The sick person should wear a mask when they are in the same room as other people. If they can't wear a mask, you can help protect yourself by covering your face when you are in the room with them.  Wash hands - Wash your hands with soap and water often.  Clean often - Here are some specific things that can help:  Wear disposable gloves when you clean. It's also a good idea to wear gloves when you have to touch the sick person's laundry, dishes, utensils, or trash. Wash your hands after removing your gloves.  Regularly clean things that are touched a lot. This includes counters, bedside tables, doorknobs, computers, phones, and bathroom surfaces.  Clean things in your home with soap and water, but also use disinfectants on appropriate surfaces. Some cleaning products work well to kill bacteria, but not viruses, so it's important to check labels. The [place] Environmental Protection Agency (EPA) has a list of products here: www.epa.gov/pesticide-registration/list-n-disinfectants-use-against-sars-cov-2.  What if I am pregnant?   More information about COVID-19 and pregnancy is available separately. (See "Patient education: COVID-19 and pregnancy (The Basics)".)  If you are pregnant and you have questions about COVID-19, talk to your doctor, nurse, or midwife. They can help.  What can I do to cope with stress and anxiety?   It's normal to feel anxious or worried about COVID-19. It's also normal to feel stressed, lonely, or tired of not being able to do your usual activities. You can take care of yourself by trying to:  Take breaks from the news  Get regular exercise and eat healthy foods  Find activities that you enjoy and can do at home  Stay in touch with your friends and family members  It might help to remember that by doing " "things like getting vaccinated and following local guidelines, you are helping to protect other people in your community.  Where can I go to learn more?   As we learn more about this virus, expert recommendations will continue to change. Check with your doctor or public health official to get the most updated information about how to protect yourself and others.  For information about COVID-19 in your area, you can call your local public health office. In the [place], this usually means your city or town's Board of Health. Many states also have a "hotline" phone number you can call.  You can find more information about COVID-19 at the following websites:  [place] Centers for Disease Control and Prevention (CDC): www.cdc.gov/COVID19  World Health Organization (WHO): www.who.int/emergencies/diseases/novel-coronavirus-2019  All topics are updated as new evidence becomes available and our peer review process is complete.  This topic retrieved from [employer] on: Oct 28, 2021.  Topic 361044 Version 67.0  Release: 29.4.2 - C29.263  © 2021 UpToDate, Inc. and/or its affiliates. All rights reserved.  Consumer Information Use and Disclaimer   This information is not specific medical advice and does not replace information you receive from your health care provider. This is only a brief summary of general information. It does NOT include all information about conditions, illnesses, injuries, tests, procedures, treatments, therapies, discharge instructions or life-style choices that may apply to you. You must talk with your health care provider for complete information about your health and treatment options. This information should not be used to decide whether or not to accept your health care provider's advice, instructions or recommendations. Only your health care provider has the knowledge and training to provide advice that is right for you. The use of this information is governed by the [employer] " License Agreement, available at https://www.[employer].com/en/solutions/lexicomp/about/tirso.The use of [employer] content is governed by the [employer] Terms of Use. ©2021 Up[employer]Date, Inc. All rights reserved.  Copyright   © 2021 Up[employer], Inc. and/or its affiliates. All rights reserved.  Patient Education       COVID-19 Discharge Instructions   About this topic   Coronavirus disease 2019 is also known as COVID-19. It is a viral illness that infects the lungs. It is caused by a virus called SARS-associated coronavirus (SARS-CoV-2).  The signs of COVID-19 most often start a few days after you have been infected. In some people, it takes longer to show signs. Others never show signs of the infection. You may have a cough, fever, shaking chills and it may be hard to breathe. You may be very tired, have muscle aches, a headache or sore throat. Some people have an upset stomach or loose stools. Others lose their sense of smell or taste. You may not have these signs all the time and they may come and go while you are sick.  The virus spreads easily through droplets when you talk, sneeze, or cough. You can pass the virus to others when you are talking close together, singing, hugging, sharing food, or shaking hands. Doctors believe the germs also survive on surfaces like tables, door handles, and telephones. However, this is not a common way that COVID-19 spreads. Doctors believe you can also spread the infection even if you dont have any symptoms, but they do not know how that happens. This is why getting vaccinated is one of the best ways to keep you healthy and slow the spread of the virus.  Some people have a mild case of COVID-19 and are able to stay at home and away from others until they feel better. Others may need to be in the hospital if they are very sick. Some people with COVID-19 can have some symptoms for weeks or months. People with COVID-19 must isolate themselves. You can start to be around others when your  doctor says it is safe to do so.       What care is needed at home?   Ask your doctor what you need to do when you go home. Make sure you ask questions if you do not understand what the doctor says.  Drink lots of water, juice, or broth to replace fluids lost from a fever.  You may use cool mist humidifiers to help ease congestion and coughing.  Use 2 to 3 pillows to prop yourself up when you lie down to make it easier to breathe and sleep.  Do not smoke and do not drink beer, wine, and mixed drinks (alcohol).  To lower the chance of passing the infection to others, get a COVID-19 vaccine after your infection has resolved.  If you have not been fully vaccinated:  Wear a mask over your mouth and nose if you are around others who are not sick. Cloth masks work best if they have more than one layer of fabric.  Wash your hands often.  Stay home in a separate room, if possible, away from others. Only go out to get medical care.  Use a separate bathroom if possible.  Do not make food for others.  What follow-up care is needed?   Your doctor may ask you to make visits to the office to check on your progress. Be sure to keep these visits. Make sure you wear a mask at these visits.  If you can, tell the staff you have COVID-19 ahead of time so they can take extra care to stop the disease from spreading.  It may take a few weeks before your health returns to normal.  What drugs may be needed?   The doctor may order drugs to:  Help with breathing  Help with fever  Help with swelling in your airways and lungs  Control coughing  Ease a sore throat  Help a runny or stuffy nose  Will physical activity be limited?   You may have to limit your physical activity. Talk to your doctor about the right amount of activity for you. If you have been very sick with COVID-19, it can take some time to get your strength back.  Will there be any other care needed?   Doctors do not know how long you can pass the virus on to others after you are  sick. This is why it is important to stay in a separate room, if possible, when you are sick. For now, doctors are giving general guidelines for you to follow after you have been sick. Before you go around other people, you should:  Be fever free for 24 hours without taking any drugs to lower the fever  Have no symptoms of cough or shortness of breath  Wait at least 10 days after first having symptoms or your first positive test, and you need to be symptom free as above. Some experts suggest waiting 20 days if you have had a more severe infection.  Talk with your doctor about getting a COVID-19 vaccine.  What problems could happen?   Fluid loss. This is dehydration.  Short-term or long-term lung damage  Heart problems  Death  When do I need to call the doctor?   You are having so much trouble breathing that you can only say one or two words at a time.  You need to sit upright at all times to be able to breathe and/or cannot lie down.  You are very confused or cannot stay awake.  Your lips or skin start to turn blue or grey.  You think you might be having a medical emergency. Some examples of medical emergencies are:  Severe chest pain.  Not able to speak or move normally.  You have trouble breathing when talking or sitting still.  You have new shortness of breath.  You become weak or dizzy.  You have very dark urine or do not pass urine for more than 8 hours.  You have new or worsening COVID-19 symptoms like:  Fever  Cough  Feeling very tired  Shaking chills  Headache  Trouble swallowing  Throwing up  Loose stools  Reddish purple spots on your fingers or toes  Teach Back: Helping You Understand   The Teach Back Method helps you understand the information we are giving you. After you talk with the staff, tell them in your own words what you learned. This helps to make sure the staff has described each thing clearly. It also helps to explain things that may have been confusing. Before going home, make sure you can do  these:  I can tell you about my condition.  I can tell you what may help ease my breathing.  I can tell you what I can do to help avoid passing the infection to others.  I can tell you what I will do if I have trouble breathing; feel sleepy or confused; or my fingertips, fingernails, skin, or lips are blue.  Where can I learn more?   Centers for Disease Control and Prevention  https://www.cdc.gov/coronavirus/2019-ncov/about/index.html   Centers for Disease Control and Prevention  https://www.cdc.gov/coronavirus/2019-ncov/hcp/disposition-in-home-patients.html   World Health Organization  https://www.who.int/news-room/q-a-detail/l-s-kiadjpkpehuco   Last Reviewed Date   2021-10-05  Consumer Information Use and Disclaimer   This information is not specific medical advice and does not replace information you receive from your health care provider. This is only a brief summary of general information. It does NOT include all information about conditions, illnesses, injuries, tests, procedures, treatments, therapies, discharge instructions or life-style choices that may apply to you. You must talk with your health care provider for complete information about your health and treatment options. This information should not be used to decide whether or not to accept your health care providers advice, instructions or recommendations. Only your health care provider has the knowledge and training to provide advice that is right for you.  Copyright   Copyright © 2021 [employer] and its affiliates and/or licensors. All rights reserved.

## 2023-09-11 NOTE — PROGRESS NOTES
The patient location is:  Patient Home  The chief complaint leading to consultation is: as below  Visit type: Virtual visit with synchronous audio and video  Total time spent with patient: 15 minutes  Each patient to whom he or she provides medical services by telemedicine is:  (1) informed of the relationship between the physician and patient and the respective role of any other health care provider with respect to management of the patient; and (2) notified that she may decline to receive medical services by telemedicine and may withdraw from such care at any time.      HPI     Chief Complaint:  URI symptoms      Zeny Charles is a 23 y.o. female with multiple medical diagnoses as listed in the medical history and problem list that presents for URI symptoms Pt is known to me but is known to this clinic with her last appointment being 4/11/2023.      URI   This is a new problem. Episode onset: 11/6/23. The maximum temperature recorded prior to her arrival was 100.4 - 100.9 F. Associated symptoms include congestion, coughing, headaches and a sore throat. Pertinent negatives include no abdominal pain, chest pain, diarrhea, dysuria, ear pain, nausea, rash, vomiting or wheezing. She has tried acetaminophen and NSAIDs for the symptoms. The treatment provided mild relief.   Fever   This is a new problem. The current episode started in the past 7 days. The problem occurs 2 to 4 times per day. The problem has been gradually improving. The maximum temperature noted was 100 to 100.9 F. The temperature was taken using an oral thermometer. Associated symptoms include congestion, coughing, headaches, muscle aches and a sore throat. Pertinent negatives include no abdominal pain, chest pain, diarrhea, ear pain, nausea, rash, sleepiness, urinary pain, vomiting or wheezing. She has tried NSAIDs and acetaminophen for the symptoms. The treatment provided significant relief.     She has not taken a home COVID-19 test.      Assessment &  Plan     Problem List Items Addressed This Visit          Endocrine    Class 2 obesity due to excess calories without serious comorbidity with body mass index (BMI) of 37.0 to 37.9 in adult    We discussed weight issues and safe, effective ways of losing pounds, includin) diet:  low carbohydrate, low fat diet, stay away from fast food, fried and processed food, use whole grain, lot of fruits and vegetables, use healthy fat such as avocado, nuts and olive oil in reasonable quantity, stay away from sodas. Regular meals with lean proteins.  2) physical activity: ideally 150 min a week, with cardiovascular and resistance activity.  Patient was encouraged to set realistic attainable goals for weight loss, and we will follow up periodically.    Discussed Mediterranean Diet recommendations (Adopted from Ernesto et al, [facility], 2018.)  - Eat primarily plant-based foods, such as fruits and vegetables, whole grains, legumes (beans) and nuts  - Limit refined carbohydrates (white pasta, bread, rice).  - Replace butter with healthy fats such as olive oil.  - Use herbs and spices instead of salt to flavor foods.  - Limit red meat and processed meats to no more than a few times a month.  - Avoid sugary sodas, bakery goods, and sweets.  - Eat fish and poultry at least twice a week.      Severe obesity (BMI 35.0-39.9) with comorbidity    As above     Other Visit Diagnoses       Upper respiratory tract infection, unspecified type    -  Primary    -AFVSS in clinic today.  -Mild symptoms, most likely viral etiology.  -based on clinical findings today, does not warrant Abx treatment at this time. D/w pt about the potential risks of inappropriate Abx use and that if patient's Sx worsens, we will re-evaluate to assess the need to change the treatment plan.    -Warm tea with honey and lemon, and/or warm salt water gargles every 4 hours PRN for sore throat. May try OTC Cepacol if pt desires.  -advised frequent hand washing, rest, and  plenty of fluids. Increase water intake to 64-80 oz daily to help thin mucus.  -Tylenol tablets as needed for fever, headaches, sore throat, ear pain, bodyaches, and/or nasal/sinus inflammation.   -Nasal Saline spray (Over the counter [employer] spray or [place])  2 sprays each nostril 2-3 times a day for nasal congestion.     Pt denies pregnancy.   Advised to screen for COVID19      I counseled the patient on fluids, rest, OTC medications that can safely be used, hand/cough hygiene, expected course of illness, and when further medical attention would be warranted.      Discussed DDx, condition, and treatment.   Education sent to patient portal/included in after visit summary.  ED precautions given.   Notify provider if symptoms do not resolve or increase in severity.   Patient verbalizes understanding and agrees with plan of care.        Relevant Medications    fluticasone propionate (FLONASE) 50 mcg/actuation nasal spray    benzonatate (TESSALON) 200 MG capsule    cetirizine (ZYRTEC) 10 MG tablet    benzocaine-menthoL (CEPACOL INSTAMAX SORE THROAT) 15-20 mg Lozg              --------------------------------------------      Health Maintenance:  Health Maintenance         Date Due Completion Date    Pneumococcal Vaccines (Age 0-64) (1 - PCV) 08/27/2008 10/24/2003    COVID-19 Vaccine (4 - Pfizer series) 05/12/2022 3/17/2022    TETANUS VACCINE 08/27/2023 8/27/2013    Influenza Vaccine (1) 09/01/2023 11/20/2021    Pap Smear 08/27/2023 ---    Chlamydia Screening 06/22/2024 6/22/2023            Advised patient on the importance of completing overdue health maintenance items    Follow Up:  Follow up in about 2 weeks (around 9/25/2023), or if symptoms worsen or fail to improve.    Exam     Review of Systems:  (as noted above)  Review of Systems   Constitutional:  Negative for fever.   HENT:  Positive for congestion and sore throat. Negative for ear pain and trouble swallowing.    Eyes:  Negative for visual disturbance.    Respiratory:  Positive for cough. Negative for chest tightness, shortness of breath and wheezing.    Cardiovascular:  Negative for chest pain.   Gastrointestinal:  Negative for abdominal pain, blood in stool, diarrhea, nausea and vomiting.   Genitourinary:  Negative for dysuria.   Skin:  Negative for rash.   Neurological:  Positive for headaches.       Physical Exam:   Physical Exam  Constitutional:       General: She is not in acute distress.     Appearance: She is ill-appearing. She is not toxic-appearing or diaphoretic.   Pulmonary:      Effort: Pulmonary effort is normal.   Neurological:      Mental Status: She is alert.   Psychiatric:         Mood and Affect: Mood normal.       There were no vitals filed for this visit.   There is no height or weight on file to calculate BMI.        History     Past Medical History:  Past Medical History:   Diagnosis Date    Factor 5 Leiden mutation, heterozygous 9/13/2016    Seizures     1 at the age of 10       Past Surgical History:  Past Surgical History:   Procedure Laterality Date    TONSILLECTOMY  6 years old       Social History:  Social History     Socioeconomic History    Marital status: Single   Tobacco Use    Smoking status: Never    Smokeless tobacco: Never   Substance and Sexual Activity    Alcohol use: No    Drug use: No    Sexual activity: Never   Social History Narrative    In 12th grade. Lives with Mom and Dad. No smokers. No alcohol, tobacco, illicit drugs. 1 dog.        Family History:  Family History   Problem Relation Age of Onset    Hypertension Mother     Irregular menses Mother     Factor V Leiden deficiency Mother     Interstitial cystitis Mother     No Known Problems Father     Factor V Leiden deficiency Sister     Interstitial cystitis Maternal Aunt     Factor V Leiden deficiency Maternal Grandmother     Thyroid disease Maternal Grandmother         hypothyroidism    Mitral valve prolapse Maternal Grandfather     Congenital heart disease Maternal  Grandfather         valve    Crohn's disease Paternal Grandmother     Breast cancer Neg Hx     Colon cancer Neg Hx     Ovarian cancer Neg Hx     Arrhythmia Neg Hx     Early death Neg Hx     Heart attacks under age 50 Neg Hx     Pacemaker/defibrilator Neg Hx        Allergies and Medications: (updated and reviewed)  Review of patient's allergies indicates:  No Known Allergies  Current Outpatient Medications   Medication Sig Dispense Refill    benzocaine-menthoL (CEPACOL INSTAMAX SORE THROAT) 15-20 mg Lozg 1 lozenge by Mucous Membrane route every 2 (two) hours as needed (sore throat). 16 lozenge 1    benzonatate (TESSALON) 200 MG capsule Take 1 capsule (200 mg total) by mouth 3 (three) times daily as needed for Cough. 15 capsule 0    cetirizine (ZYRTEC) 10 MG tablet Take 1 tablet (10 mg total) by mouth once daily. 10 tablet 0    cholecalciferol, vitamin D3, 1,250 mcg (50,000 unit) capsule Take 1 capsule (50,000 Units total) by mouth every 7 days. 12 capsule 0    dextroamphetamine-amphetamine 10 mg Tab Take 1 tablet (10 mg total) by mouth 2 (two) times a day. 60 tablet 0    fluticasone propionate (FLONASE) 50 mcg/actuation nasal spray 2 sprays (100 mcg total) by Each Nostril route once daily. 11.1 mL 0    tirzepatide 7.5 mg/0.5 mL PnIj Inject 7.5 mg into the skin every 7 days. 4 pen 3     No current facility-administered medications for this visit.       Patient Care Team:  Chris Rendon MD as PCP - General (Family Medicine)  Zahra Cartwright MA (Inactive) as Care Coordinator  Mary Urena MD as Consulting Physician (Neurology)       - The patient was sent an After Visit Summary virtually that lists all medications with directions, allergies, education, orders placed during this encounter and follow-up instructions.      - I have reviewed the patient's medical information including past medical, family, and social history sections including the medications and allergies.      - We discussed the patient's current  medications.     This note was created by combination of typed  and MModal dictation.  Transcription errors may be present.  If there are any questions, please contact me.       Trever Olivares NP

## 2023-09-11 NOTE — LETTER
September 11, 2023    Zeny Charles  [address] [facility] [address] [place] [address]         [address] [place] [address]  Phone: [phone]  Fax: [phone] September 11, 2023     Patient: Zeny Charles   YOB: 2000   Date of Visit: 9/11/2023       To Whom It May Concern:    It is my medical opinion that Zeny Charles may return to work on 9/12/23 .    If you have any questions or concerns, please don't hesitate to call.    Sincerely,        Trever Olivares, NP

## 2023-09-11 NOTE — LETTER
September 11, 2023    Zeny Charles  [address] [facility] [place] [address]         [address] [place] [address]  Phone: [phone]  Fax: [phone] September 11, 2023     Patient: Zeny Charles   YOB: 2000   Date of Visit: 9/11/2023       To Whom It May Concern:    It is my medical opinion that Zeny Charles may return to work on 9/13/23 .    If you have any questions or concerns, please don't hesitate to call.    Sincerely,        Trever Olivares, NP

## 2023-09-13 ENCOUNTER — PATIENT MESSAGE (OUTPATIENT)
Dept: FAMILY MEDICINE | Facility: CLINIC | Age: 23
End: 2023-09-13
Payer: COMMERCIAL

## 2023-10-16 ENCOUNTER — OFFICE VISIT (OUTPATIENT)
Dept: FAMILY MEDICINE | Facility: CLINIC | Age: 23
End: 2023-10-16
Payer: COMMERCIAL

## 2023-10-16 DIAGNOSIS — U07.1 COVID-19 VIRUS INFECTION: Primary | ICD-10-CM

## 2023-10-16 PROCEDURE — 99214 OFFICE O/P EST MOD 30 MIN: CPT | Mod: 95,,, | Performed by: FAMILY MEDICINE

## 2023-10-16 PROCEDURE — 99214 PR OFFICE/OUTPT VISIT, EST, LEVL IV, 30-39 MIN: ICD-10-PCS | Mod: 95,,, | Performed by: FAMILY MEDICINE

## 2023-10-16 RX ORDER — PROMETHAZINE HYDROCHLORIDE AND DEXTROMETHORPHAN HYDROBROMIDE 6.25; 15 MG/5ML; MG/5ML
5 SYRUP ORAL EVERY 6 HOURS PRN
Qty: 180 ML | Refills: 0 | Status: SHIPPED | OUTPATIENT
Start: 2023-10-16 | End: 2023-10-26

## 2023-10-16 NOTE — LETTER
October 16, 2023      [facility]  [address] [facility]  [place] [address]  Phone: [phone]  Fax: [phone]       Patient: Zeny Charles   YOB: 2000  Date of Visit: 10/16/2023    To Whom It May Concern:    Susana Charles  was at [facility] on 10/16/2023. The patient may return to work/school on 10/23/23 with no restrictions. If you have any questions or concerns, or if I can be of further assistance, please do not hesitate to contact me.    Sincerely,      Chris Rendon MD

## 2023-10-16 NOTE — PROGRESS NOTES
[facility]  Progress Note    SUBJECTIVE:     Chief Complaint   Patient presents with    COVID-19 Concerns       The patient location is: Home  The chief complaint leading to consultation is: COVID-19 Concerns    Visit type: Virtual visit with synchronous audio and video  Total time spent with patient: 25  Each patient to whom he or she provides medical services by telemedicine is:  (1) informed of the relationship between the physician and patient and the respective role of any other health care provider with respect to management of the patient; and (2) notified that he or she may decline to receive medical services by telemedicine and may withdraw from such care at any time.      HPI: Patient is a 23 y.o. female via virtual visit, here for c/o having covid which she tested positive for yesterday. Has fever up to 101 F, body aches, cough, congestion. No SOB. Cough is slightly productive. Been taking otc meds with some relief.     Review of patient's allergies indicates:  No Known Allergies    Past Medical History:   Diagnosis Date    Factor 5 Leiden mutation, heterozygous 9/13/2016    Seizures     1 at the age of 10     Past Surgical History:   Procedure Laterality Date    TONSILLECTOMY  6 years old     Family History   Problem Relation Age of Onset    Hypertension Mother     Irregular menses Mother     Factor V Leiden deficiency Mother     Interstitial cystitis Mother     No Known Problems Father     Factor V Leiden deficiency Sister     Interstitial cystitis Maternal Aunt     Factor V Leiden deficiency Maternal Grandmother     Thyroid disease Maternal Grandmother         hypothyroidism    Mitral valve prolapse Maternal Grandfather     Congenital heart disease Maternal Grandfather         valve    Crohn's disease Paternal Grandmother     Breast cancer Neg Hx     Colon cancer Neg Hx     Ovarian cancer Neg Hx     Arrhythmia Neg Hx     Early death Neg Hx     Heart attacks under age 50 Neg Hx      Pacemaker/defibrilator Neg Hx      Social History     Tobacco Use    Smoking status: Never    Smokeless tobacco: Never   Substance Use Topics    Alcohol use: No    Drug use: No        Review of Systems   Constitutional:  Positive for chills, fever and malaise/fatigue.   HENT:  Positive for congestion. Negative for hearing loss and sore throat.    Respiratory:  Positive for cough and sputum production. Negative for shortness of breath and wheezing.    Cardiovascular:  Negative for chest pain.   Gastrointestinal:  Negative for nausea and vomiting.   Musculoskeletal:  Negative for neck pain.   Neurological:  Negative for weakness and headaches.   All other systems reviewed and are negative.    OBJECTIVE:   There were no vitals filed for this visit.  There is no height or weight on file to calculate BMI.    Physical Exam  Constitutional:       General: She is not in acute distress.     Appearance: She is not diaphoretic.   HENT:      Head: Normocephalic and atraumatic.   Eyes:      Conjunctiva/sclera: Conjunctivae normal.   Cardiovascular:      Rate and Rhythm: Normal rate and regular rhythm.      Heart sounds: No murmur heard.     No friction rub. No gallop.   Pulmonary:      Effort: Pulmonary effort is normal. No respiratory distress.      Breath sounds: Normal breath sounds. No stridor. No wheezing or rales.   Abdominal:      General: Bowel sounds are normal.      Palpations: Abdomen is soft.   Lymphadenopathy:      Cervical: No cervical adenopathy.   Neurological:      Mental Status: She is alert and oriented to person, place, and time.         Old records were reviewed. Labs and/or images were independently reviewed.    ASSESSMENT     1. COVID-19 virus infection        PLAN:     COVID-19 virus infection  -     promethazine-dextromethorphan (PROMETHAZINE-DM) 6.25-15 mg/5 mL Syrp; Take 5 mLs by mouth every 6 (six) hours as needed (cough).  Dispense: 180 mL; Refill: 0  -     OK to take tylenol as needed PRN fever.  Take mucinex and or claritin to help decrease congestion. Educated patient to drink plenty of fluids and to take vitamin C to help boost immune system. Instructed patient to call or RTC if symptoms persist or worsen.        RTC PRN    Chris Rendon MD  10/16/2023 10:57 AM

## 2023-10-20 ENCOUNTER — PATIENT MESSAGE (OUTPATIENT)
Dept: FAMILY MEDICINE | Facility: CLINIC | Age: 23
End: 2023-10-20
Payer: COMMERCIAL

## 2024-01-12 ENCOUNTER — ON-DEMAND VIRTUAL (OUTPATIENT)
Dept: URGENT CARE | Facility: CLINIC | Age: 24
End: 2024-01-12
Payer: COMMERCIAL

## 2024-01-12 DIAGNOSIS — J06.9 URI WITH COUGH AND CONGESTION: Primary | ICD-10-CM

## 2024-01-12 PROCEDURE — 99213 OFFICE O/P EST LOW 20 MIN: CPT | Mod: 95,,, | Performed by: NURSE PRACTITIONER

## 2024-01-12 RX ORDER — BENZONATATE 100 MG/1
100 CAPSULE ORAL 3 TIMES DAILY PRN
Qty: 30 CAPSULE | Refills: 0 | Status: SHIPPED | OUTPATIENT
Start: 2024-01-12 | End: 2024-01-22

## 2024-01-12 RX ORDER — AZELASTINE 1 MG/ML
1 SPRAY, METERED NASAL 2 TIMES DAILY
Qty: 30 ML | Refills: 0 | Status: SHIPPED | OUTPATIENT
Start: 2024-01-12 | End: 2024-01-22

## 2024-01-12 NOTE — PROGRESS NOTES
Subjective:      Patient ID: Zeny Charles is a 23 y.o. female.    Vitals:  vitals were not taken for this visit.     Chief Complaint: Cough      Visit Type: TELE AUDIOVISUAL    Present with the patient at the time of consultation: TELEMED PRESENT WITH PATIENT: None    Past Medical History:   Diagnosis Date    Factor 5 Leiden mutation, heterozygous 9/13/2016    Seizures     1 at the age of 10     Past Surgical History:   Procedure Laterality Date    TONSILLECTOMY  6 years old     Review of patient's allergies indicates:  No Known Allergies  Current Outpatient Medications on File Prior to Visit   Medication Sig Dispense Refill    benzocaine-menthoL (CEPACOL INSTAMAX SORE THROAT) 15-20 mg Lozg 1 lozenge by Mucous Membrane route every 2 (two) hours as needed (sore throat). 16 lozenge 1    benzonatate (TESSALON) 200 MG capsule Take 1 capsule (200 mg total) by mouth 3 (three) times daily as needed for Cough. 15 capsule 0    cetirizine (ZYRTEC) 10 MG tablet Take 1 tablet (10 mg total) by mouth once daily. 10 tablet 0    cholecalciferol, vitamin D3, 1,250 mcg (50,000 unit) capsule Take 1 capsule (50,000 Units total) by mouth every 7 days. 12 capsule 0    dextroamphetamine-amphetamine 10 mg Tab Take 1 tablet (10 mg total) by mouth 2 (two) times a day. 60 tablet 0    fluticasone propionate (FLONASE) 50 mcg/actuation nasal spray 2 sprays (100 mcg total) by Each Nostril route once daily. 11.1 mL 0    tirzepatide 7.5 mg/0.5 mL PnIj Inject 7.5 mg into the skin every 7 days. 4 pen 3     No current facility-administered medications on file prior to visit.     Family History   Problem Relation Age of Onset    Hypertension Mother     Irregular menses Mother     Factor V Leiden deficiency Mother     Interstitial cystitis Mother     No Known Problems Father     Factor V Leiden deficiency Sister     Interstitial cystitis Maternal Aunt     Factor V Leiden deficiency Maternal Grandmother     Thyroid disease Maternal Grandmother          hypothyroidism    Mitral valve prolapse Maternal Grandfather     Congenital heart disease Maternal Grandfather         valve    Crohn's disease Paternal Grandmother     Breast cancer Neg Hx     Colon cancer Neg Hx     Ovarian cancer Neg Hx     Arrhythmia Neg Hx     Early death Neg Hx     Heart attacks under age 50 Neg Hx     Pacemaker/defibrilator Neg Hx        Medications Ordered                [facility] [address] [place] [address] [address] [facility]   [address] [place] [address]    Telephone: [phone]   Fax: [phone]   Hours: Not open 24 hours                         E-Prescribed (2 of 2)              azelastine (ASTELIN) 137 mcg (0.1 %) nasal spray    Si spray (137 mcg total) by Nasal route 2 (two) times daily.       Start: 24     Quantity: 30 mL Refills: 0                         benzonatate (TESSALON) 100 MG capsule    Sig: Take 1 capsule (100 mg total) by mouth 3 (three) times daily as needed for Cough.       Start: 24     Quantity: 30 capsule Refills: 0                           Ohs Peq Odvv Intake    2024  3:24 PM CST - Filed by Patient   Describe your reason for todays visit Been sick since monday, cant get rid of cough   What is your current physical address in the event of a medical emergency?    Are you able to take your vital signs? No   Please attach any relevant images or files          Ambulatory 22 yo female with c/o cold symptoms for the last week. She states she has tried several otc medication without relief. She states dry cough. No fever. No sob or wheezing.     Cough  Associated symptoms include postnasal drip. Pertinent negatives include no fever.       Constitution: Negative. Negative for fever.   HENT:  Positive for congestion and postnasal drip.    Cardiovascular: Negative.    Respiratory:  Positive for cough.    Gastrointestinal: Negative.    Endocrine: negative.   Genitourinary: Negative.  Negative for frequency and urgency.    Musculoskeletal: Negative.    Skin: Negative.    Allergic/Immunologic: Negative.    Neurological: Negative.    Hematologic/Lymphatic: Negative.    Psychiatric/Behavioral: Negative.          Objective:   The physical exam was conducted virtually.  Physical Exam   Constitutional: She is oriented to person, place, and time. She appears well-developed.   HENT:   Head: Normocephalic and atraumatic.   Ears:   Right Ear: Hearing, tympanic membrane and external ear normal.   Left Ear: Hearing, tympanic membrane and external ear normal.   Nose: Rhinorrhea and congestion present.   Mouth/Throat: Uvula is midline, oropharynx is clear and moist and mucous membranes are normal.   Eyes: Conjunctivae and EOM are normal. Pupils are equal, round, and reactive to light.   Neck: Neck supple.   Cardiovascular: Normal rate.   Pulmonary/Chest: Effort normal and breath sounds normal.   Musculoskeletal: Normal range of motion.         General: Normal range of motion.   Neurological: She is alert and oriented to person, place, and time.   Skin: Skin is warm.   Psychiatric: Her behavior is normal. Thought content normal.   Nursing note and vitals reviewed.      Assessment:     1. URI with cough and congestion        Plan:     -Below are suggestions for symptomatic relief:              -Tylenol every 4 hours OR ibuprofen every 6 hours as needed for pain/fever.              -Salt water gargles to soothe throat pain.              -Chloroseptic spray also helps to numb throat pain.              -Nasal saline spray reduces inflammation and dryness.              -Warm face compresses to help with facial sinus pain/pressure.              -Vicks vapor rub at night.              -Flonase OTC or Nasacort OTC for nasal congestion.              -Simple foods like chicken noodle soup.              -Delsym helps with coughing at night              -Zyrtec/Claritin during the day & Benadryl at night may help with allergies.     If you DO NOT have  Hypertension or any history of palpitations, it is ok to take over the counter Sudafed or Mucinex D or Allegra-D or Claritin-D or Zyrtec-D.  If you do take one of the above, it is ok to combine that with plain over the counter Mucinex or Allegra or Claritin or Zyrtec. If, for example, you are taking Zyrtec -D, you can combine that with Mucinex, but not Mucinex-D.  If you are taking Mucinex-D, you can combine that with plain Allegra or Claritin or Zyrtec.   If you DO have Hypertension or palpitations, it is safe to take Coricidin HBP for relief of sinus symptoms.     Please follow up with your Primary care provider within 2-5 days if your signs and symptoms have not resolved or worsen.      If your condition worsens or fails to improve we recommend that you receive another evaluation at the emergency room immediately or contact your primary medical clinic to discuss your concerns.   You must understand that you have received an Urgent Care treatment only and that you may be released before all of your medical problems are known or treated. You, the patient, will arrange for follow up care as instructed.      RED FLAGS/WARNING SYMPTOMS DISCUSSED WITH PATIENT THAT WOULD WARRANT EMERGENT MEDICAL ATTENTION. PATIENT VERBALIZED UNDERSTANDING.     URI with cough and congestion  -     benzonatate (TESSALON) 100 MG capsule; Take 1 capsule (100 mg total) by mouth 3 (three) times daily as needed for Cough.  Dispense: 30 capsule; Refill: 0  -     azelastine (ASTELIN) 137 mcg (0.1 %) nasal spray; 1 spray (137 mcg total) by Nasal route 2 (two) times daily.  Dispense: 30 mL; Refill: 0

## 2024-01-12 NOTE — LETTER
January 12, 2024    Zeny Charles  [address] [address]             Virtual Visit - Urgent Care  Urgent Care  [address] [facility] [address]   January 12, 2024     Patient: Zeny Charles   YOB: 2000   Date of Visit: 1/12/2024       To Whom it May Concern:    Zeny Charles was seen virtually on 1/12/2024. She may return to school on 01/15/2024 . Please excuse for absence on 1/9/2024.    Please excuse her from any classes or work missed.    If you have any questions or concerns, please don't hesitate to call.    Sincerely,           Chelsea Corea, ARMINP

## 2024-01-18 ENCOUNTER — OFFICE VISIT (OUTPATIENT)
Dept: INTERNAL MEDICINE | Facility: CLINIC | Age: 24
End: 2024-01-18
Payer: COMMERCIAL

## 2024-01-18 ENCOUNTER — LAB VISIT (OUTPATIENT)
Dept: LAB | Facility: HOSPITAL | Age: 24
End: 2024-01-18
Payer: COMMERCIAL

## 2024-01-18 VITALS
WEIGHT: 181 LBS | OXYGEN SATURATION: 98 % | HEART RATE: 103 BPM | BODY MASS INDEX: 30.9 KG/M2 | HEIGHT: 64 IN | DIASTOLIC BLOOD PRESSURE: 70 MMHG | SYSTOLIC BLOOD PRESSURE: 122 MMHG

## 2024-01-18 DIAGNOSIS — R11.2 NAUSEA AND VOMITING, UNSPECIFIED VOMITING TYPE: ICD-10-CM

## 2024-01-18 DIAGNOSIS — R10.13 EPIGASTRIC PAIN: Primary | ICD-10-CM

## 2024-01-18 DIAGNOSIS — R10.13 EPIGASTRIC PAIN: ICD-10-CM

## 2024-01-18 LAB
ALBUMIN SERPL BCP-MCNC: 4.4 G/DL (ref 3.5–5.2)
ALP SERPL-CCNC: 67 U/L (ref 55–135)
ALT SERPL W/O P-5'-P-CCNC: 29 U/L (ref 10–44)
ANION GAP SERPL CALC-SCNC: 11 MMOL/L (ref 8–16)
AST SERPL-CCNC: 20 U/L (ref 10–40)
BASOPHILS # BLD AUTO: 0.04 K/UL (ref 0–0.2)
BASOPHILS NFR BLD: 0.2 % (ref 0–1.9)
BILIRUB SERPL-MCNC: 0.8 MG/DL (ref 0.1–1)
BUN SERPL-MCNC: 8 MG/DL (ref 6–20)
CALCIUM SERPL-MCNC: 9.7 MG/DL (ref 8.7–10.5)
CHLORIDE SERPL-SCNC: 105 MMOL/L (ref 95–110)
CO2 SERPL-SCNC: 24 MMOL/L (ref 23–29)
CREAT SERPL-MCNC: 0.7 MG/DL (ref 0.5–1.4)
DIFFERENTIAL METHOD BLD: ABNORMAL
EOSINOPHIL # BLD AUTO: 0.1 K/UL (ref 0–0.5)
EOSINOPHIL NFR BLD: 0.5 % (ref 0–8)
ERYTHROCYTE [DISTWIDTH] IN BLOOD BY AUTOMATED COUNT: 12.3 % (ref 11.5–14.5)
EST. GFR  (NO RACE VARIABLE): >60 ML/MIN/1.73 M^2
GLUCOSE SERPL-MCNC: 95 MG/DL (ref 70–110)
HCT VFR BLD AUTO: 51.1 % (ref 37–48.5)
HGB BLD-MCNC: 16.8 G/DL (ref 12–16)
IMM GRANULOCYTES # BLD AUTO: 0.06 K/UL (ref 0–0.04)
IMM GRANULOCYTES NFR BLD AUTO: 0.3 % (ref 0–0.5)
LIPASE SERPL-CCNC: 14 U/L (ref 4–60)
LYMPHOCYTES # BLD AUTO: 0.5 K/UL (ref 1–4.8)
LYMPHOCYTES NFR BLD: 2.9 % (ref 18–48)
MCH RBC QN AUTO: 31.4 PG (ref 27–31)
MCHC RBC AUTO-ENTMCNC: 32.9 G/DL (ref 32–36)
MCV RBC AUTO: 96 FL (ref 82–98)
MONOCYTES # BLD AUTO: 1 K/UL (ref 0.3–1)
MONOCYTES NFR BLD: 5.9 % (ref 4–15)
NEUTROPHILS # BLD AUTO: 15.7 K/UL (ref 1.8–7.7)
NEUTROPHILS NFR BLD: 90.2 % (ref 38–73)
NRBC BLD-RTO: 0 /100 WBC
PLATELET # BLD AUTO: 333 K/UL (ref 150–450)
PMV BLD AUTO: 12.7 FL (ref 9.2–12.9)
POTASSIUM SERPL-SCNC: 3.7 MMOL/L (ref 3.5–5.1)
PROT SERPL-MCNC: 8.5 G/DL (ref 6–8.4)
RBC # BLD AUTO: 5.35 M/UL (ref 4–5.4)
SODIUM SERPL-SCNC: 140 MMOL/L (ref 136–145)
WBC # BLD AUTO: 17.46 K/UL (ref 3.9–12.7)

## 2024-01-18 PROCEDURE — 3074F SYST BP LT 130 MM HG: CPT | Mod: CPTII,S$GLB,, | Performed by: INTERNAL MEDICINE

## 2024-01-18 PROCEDURE — 3008F BODY MASS INDEX DOCD: CPT | Mod: CPTII,S$GLB,, | Performed by: INTERNAL MEDICINE

## 2024-01-18 PROCEDURE — 83690 ASSAY OF LIPASE: CPT | Performed by: INTERNAL MEDICINE

## 2024-01-18 PROCEDURE — 85025 COMPLETE CBC W/AUTO DIFF WBC: CPT | Performed by: INTERNAL MEDICINE

## 2024-01-18 PROCEDURE — 80053 COMPREHEN METABOLIC PANEL: CPT | Performed by: INTERNAL MEDICINE

## 2024-01-18 PROCEDURE — 99999 PR PBB SHADOW E&M-EST. PATIENT-LVL III: CPT | Mod: PBBFAC,,, | Performed by: INTERNAL MEDICINE

## 2024-01-18 PROCEDURE — 36415 COLL VENOUS BLD VENIPUNCTURE: CPT | Performed by: INTERNAL MEDICINE

## 2024-01-18 PROCEDURE — 99214 OFFICE O/P EST MOD 30 MIN: CPT | Mod: S$GLB,,, | Performed by: INTERNAL MEDICINE

## 2024-01-18 PROCEDURE — 3078F DIAST BP <80 MM HG: CPT | Mod: CPTII,S$GLB,, | Performed by: INTERNAL MEDICINE

## 2024-01-18 RX ORDER — OMEPRAZOLE 20 MG/1
20 CAPSULE, DELAYED RELEASE ORAL DAILY
Qty: 30 CAPSULE | Refills: 11 | Status: SHIPPED | OUTPATIENT
Start: 2024-01-18 | End: 2024-02-26

## 2024-01-18 NOTE — PROGRESS NOTES
Subjective:       Patient ID: Zeny Charles is a 23 y.o. female.    Chief Complaint: Abdominal Pain    HPI    Presents for evaluation of nausea and vomiting with associated epigastric pain.     Started 3 weeks ago after eating chicken wings. Also having burning abdominal epigastric pain. Vomiting every other day now.   Moving bowels daily, some loose stools.   Has tried zofran.   Denies blood in vomit or stool.       Review of Systems   Constitutional:  Negative for activity change, appetite change and chills.   HENT:  Negative for ear pain, sinus pressure/congestion and sneezing.    Respiratory:  Negative for cough and shortness of breath.    Cardiovascular:  Negative for chest pain, palpitations and leg swelling.   Gastrointestinal:  Positive for abdominal pain, nausea and vomiting. Negative for abdominal distention and diarrhea.   Genitourinary:  Negative for dysuria and hematuria.   Musculoskeletal:  Negative for arthralgias, back pain and myalgias.   Neurological:  Negative for dizziness and headaches.   Psychiatric/Behavioral:  Negative for agitation. The patient is not nervous/anxious.            Past Medical History:   Diagnosis Date    Factor 5 Leiden mutation, heterozygous 09/13/2016    Multiple sclerosis 04/2023    Seizures     1 at the age of 10     Past Surgical History:   Procedure Laterality Date    TONSILLECTOMY  6 years old      Patient Active Problem List   Diagnosis    Factor 5 Leiden mutation, heterozygous    Seizure disorder    Class 2 obesity due to excess calories without serious comorbidity with body mass index (BMI) of 37.0 to 37.9 in adult    ADHD (attention deficit hyperactivity disorder), inattentive type    Severe obesity (BMI 35.0-39.9) with comorbidity    Impaired sensation    CNS demyelination    Paresthesia    Vitamin D deficiency    Leucocytosis    Adjustment disorder with mixed anxiety and depressed mood    Ulnar neuropathy of both upper extremities    Carpal tunnel syndrome,  bilateral        Objective:      Physical Exam  Constitutional:       Appearance: Normal appearance.   HENT:      Head: Normocephalic.   Cardiovascular:      Rate and Rhythm: Normal rate and regular rhythm.      Pulses: Normal pulses.      Heart sounds: Normal heart sounds.   Pulmonary:      Effort: Pulmonary effort is normal.      Breath sounds: Normal breath sounds.   Abdominal:      General: Abdomen is flat. Bowel sounds are normal.      Palpations: Abdomen is soft.   Musculoskeletal:         General: Normal range of motion.      Cervical back: Normal range of motion and neck supple.   Skin:     General: Skin is warm and dry.   Neurological:      General: No focal deficit present.      Mental Status: She is alert and oriented to person, place, and time.   Psychiatric:         Mood and Affect: Mood normal.         Assessment:       Problem List Items Addressed This Visit    None  Visit Diagnoses       Epigastric pain    -  Primary    Relevant Orders    CT Abdomen Pelvis  Without Contrast (Completed)    H. pylori antigen, stool    CBC W/ AUTO DIFFERENTIAL (Completed)    COMPREHENSIVE METABOLIC PANEL (Completed)    LIPASE (Completed)    Nausea and vomiting, unspecified vomiting type        Relevant Orders    H. pylori antigen, stool    CBC W/ AUTO DIFFERENTIAL (Completed)    COMPREHENSIVE METABOLIC PANEL (Completed)    LIPASE (Completed)            Plan:         Zeny was seen today for abdominal pain.    Diagnoses and all orders for this visit:    Epigastric pain  -     CT Abdomen Pelvis  Without Contrast; Future  -     H. pylori antigen, stool; Future  -     CBC W/ AUTO DIFFERENTIAL; Future  -     COMPREHENSIVE METABOLIC PANEL; Future  -     LIPASE; Future    Nausea and vomiting, unspecified vomiting type  -     H. pylori antigen, stool; Future  -     CBC W/ AUTO DIFFERENTIAL; Future  -     COMPREHENSIVE METABOLIC PANEL; Future  -     LIPASE; Future  -     omeprazole (PRILOSEC) 20 MG capsule; Take 1 capsule (20 mg  total) by mouth once daily. (Patient not taking: Reported on 1/22/2024)     Start PPI  Check H pylori  Check CT abdomen and labs   She has appt with GI tomorrow.             Batsheva Raygoza MD   Internal Medicine   Primary Care

## 2024-01-19 ENCOUNTER — OFFICE VISIT (OUTPATIENT)
Dept: FAMILY MEDICINE | Facility: CLINIC | Age: 24
End: 2024-01-19
Payer: COMMERCIAL

## 2024-01-19 ENCOUNTER — HOSPITAL ENCOUNTER (OUTPATIENT)
Dept: RADIOLOGY | Facility: HOSPITAL | Age: 24
Discharge: HOME | End: 2024-01-19
Attending: INTERNAL MEDICINE
Payer: COMMERCIAL

## 2024-01-19 DIAGNOSIS — R10.13 EPIGASTRIC PAIN: ICD-10-CM

## 2024-01-19 DIAGNOSIS — Z11.3 SCREEN FOR SEXUALLY TRANSMITTED DISEASES: Primary | ICD-10-CM

## 2024-01-19 DIAGNOSIS — Z91.89 AT RISK FOR SEXUALLY TRANSMITTED DISEASE DUE TO UNPROTECTED SEX: ICD-10-CM

## 2024-01-19 PROCEDURE — 99212 OFFICE O/P EST SF 10 MIN: CPT | Mod: 95,,,

## 2024-01-19 PROCEDURE — 74176 CT ABD & PELVIS W/O CONTRAST: CPT | Mod: 26,,, | Performed by: RADIOLOGY

## 2024-01-19 PROCEDURE — 74176 CT ABD & PELVIS W/O CONTRAST: CPT | Mod: TC

## 2024-01-19 NOTE — PROGRESS NOTES
The patient location is:  Patient Home  The chief complaint leading to consultation is: as below  Visit type: Virtual visit with synchronous audio and video  Total time spent with patient: 10 minutes  Each patient to whom he or she provides medical services by telemedicine is:  (1) informed of the relationship between the physician and patient and the respective role of any other health care provider with respect to management of the patient; and (2) notified that she may decline to receive medical services by telemedicine and may withdraw from such care at any time.      HPI     Chief Complaint:  No chief complaint on file.      Zeny Charles is a 23 y.o. female with multiple medical diagnoses as listed in the medical history and problem list that presents for STI testing.  Pt is new to me but is known to this clinic with her last appointment being 10/16/2023.      Dysuria   This is a new problem. The current episode started 1 to 4 weeks ago. The problem occurs intermittently. The problem has been gradually worsening. The quality of the pain is described as burning. The pain is at a severity of 8/10. There has been no fever. She is Sexually active. Associated symptoms include chills.     Pt presents for STI testing.  Denies vaginal discharge, bumps or pain.   Sexually active with one partner. Partner has not had symptoms. Does not use condoms and not on birth control. LMP January 1st, regular period with average bleeding.      Assessment & Plan     Problem List Items Addressed This Visit    None  Visit Diagnoses       Screen for sexually transmitted diseases    -  Primary  Requesting STI testing. Sexually active with one partner. Partner has not had symptoms. Does not use condoms and not on birth control. LMP January 1st, regular period with average bleeding. Will order STI testing.    Relevant Orders    C. trachomatis/N. gonorrhoeae by AMP DNA    HIV 1/2 Ag/Ab (4th Gen)    RPR    Hepatitis C Antibody    At risk  for sexually transmitted disease due to unprotected sex      Does not use condoms or birth control. Does not use birth control due to concern of side effects. Uses natural family planning to prevent pregnancy. Discussed the variety of options available for birth control in case she becomes interested in the future.            --------------------------------------------      Health Maintenance:  Health Maintenance         Date Due Completion Date    Pneumococcal Vaccines (Age 0-64) (1 - PCV) 08/27/2008 10/24/2003    TETANUS VACCINE 08/27/2023 8/27/2013    Pap Smear Never done ---    COVID-19 Vaccine (4 - 2023-24 season) 09/01/2023 3/17/2022    Chlamydia Screening 06/22/2024 6/22/2023            Health maintenance reviewed    Follow Up:  No follow-ups on file.    Exam     Review of Systems:  (as noted above)  Review of Systems   Constitutional:  Positive for chills.   Genitourinary:  Positive for dysuria.       Physical Exam:   Physical Exam  There were no vitals filed for this visit.   There is no height or weight on file to calculate BMI.        History     Past Medical History:  Past Medical History:   Diagnosis Date    Factor 5 Leiden mutation, heterozygous 9/13/2016    Seizures     1 at the age of 10       Past Surgical History:  Past Surgical History:   Procedure Laterality Date    TONSILLECTOMY  6 years old       Social History:  Social History     Socioeconomic History    Marital status: Single   Tobacco Use    Smoking status: Never    Smokeless tobacco: Never   Substance and Sexual Activity    Alcohol use: No    Drug use: No    Sexual activity: Never   Social History Narrative    In 12th grade. Lives with Mom and Dad. No smokers. No alcohol, tobacco, illicit drugs. 1 dog.      Social Determinants of Health     Physical Activity: Unknown (1/17/2024)    Exercise Vital Sign     Days of Exercise per Week: 0 days       Family History:  Family History   Problem Relation Age of Onset    Hypertension Mother      Irregular menses Mother     Factor V Leiden deficiency Mother     Interstitial cystitis Mother     No Known Problems Father     Factor V Leiden deficiency Sister     Interstitial cystitis Maternal Aunt     Factor V Leiden deficiency Maternal Grandmother     Thyroid disease Maternal Grandmother         hypothyroidism    Mitral valve prolapse Maternal Grandfather     Congenital heart disease Maternal Grandfather         valve    Crohn's disease Paternal Grandmother     Breast cancer Neg Hx     Colon cancer Neg Hx     Ovarian cancer Neg Hx     Arrhythmia Neg Hx     Early death Neg Hx     Heart attacks under age 50 Neg Hx     Pacemaker/defibrilator Neg Hx        Allergies and Medications: (updated and reviewed)  Review of patient's allergies indicates:  No Known Allergies  Current Outpatient Medications   Medication Sig Dispense Refill    azelastine (ASTELIN) 137 mcg (0.1 %) nasal spray 1 spray (137 mcg total) by Nasal route 2 (two) times daily. 30 mL 0    benzonatate (TESSALON) 100 MG capsule Take 1 capsule (100 mg total) by mouth 3 (three) times daily as needed for Cough. 30 capsule 0    cholecalciferol, vitamin D3, 1,250 mcg (50,000 unit) capsule Take 1 capsule (50,000 Units total) by mouth every 7 days. 12 capsule 0    dextroamphetamine-amphetamine 10 mg Tab Take 1 tablet (10 mg total) by mouth 2 (two) times a day. 60 tablet 0    omeprazole (PRILOSEC) 20 MG capsule Take 1 capsule (20 mg total) by mouth once daily. 30 capsule 11     No current facility-administered medications for this visit.       Patient Care Team:  Chris Rendon MD as PCP - General (Family Medicine)  Zahra Cartwright MA (Inactive) as Care Coordinator  Mary Urena MD as Consulting Physician (Neurology)  Attending, Lab (Lab)       - The patient was sent an After Visit Summary virtually that lists all medications with directions, allergies, education, orders placed during this encounter and follow-up instructions.      - I have reviewed the  patient's medical information including past medical, family, and social history sections including the medications and allergies.      - We discussed the patient's current medications.     This note was created by combination of typed  and MModal dictation.  Transcription errors may be present.  If there are any questions, please contact me.  Cathy Mabry NP

## 2024-01-22 ENCOUNTER — OFFICE VISIT (OUTPATIENT)
Dept: GASTROENTEROLOGY | Facility: CLINIC | Age: 24
End: 2024-01-22
Payer: COMMERCIAL

## 2024-01-22 ENCOUNTER — LAB VISIT (OUTPATIENT)
Dept: LAB | Facility: HOSPITAL | Age: 24
End: 2024-01-22
Attending: INTERNAL MEDICINE
Payer: COMMERCIAL

## 2024-01-22 ENCOUNTER — PATIENT MESSAGE (OUTPATIENT)
Dept: INTERNAL MEDICINE | Facility: CLINIC | Age: 24
End: 2024-01-22
Payer: COMMERCIAL

## 2024-01-22 ENCOUNTER — PATIENT MESSAGE (OUTPATIENT)
Dept: GASTROENTEROLOGY | Facility: CLINIC | Age: 24
End: 2024-01-22

## 2024-01-22 VITALS
WEIGHT: 181 LBS | DIASTOLIC BLOOD PRESSURE: 74 MMHG | BODY MASS INDEX: 30.9 KG/M2 | HEIGHT: 64 IN | SYSTOLIC BLOOD PRESSURE: 111 MMHG | HEART RATE: 72 BPM

## 2024-01-22 DIAGNOSIS — R11.2 NAUSEA AND VOMITING, UNSPECIFIED VOMITING TYPE: Primary | ICD-10-CM

## 2024-01-22 DIAGNOSIS — A09 DIARRHEA OF INFECTIOUS ORIGIN: ICD-10-CM

## 2024-01-22 DIAGNOSIS — R14.0 ABDOMINAL BLOATING: ICD-10-CM

## 2024-01-22 PROCEDURE — 1159F MED LIST DOCD IN RCRD: CPT | Mod: CPTII,S$GLB,, | Performed by: INTERNAL MEDICINE

## 2024-01-22 PROCEDURE — 87209 SMEAR COMPLEX STAIN: CPT | Performed by: INTERNAL MEDICINE

## 2024-01-22 PROCEDURE — 99999 PR PBB SHADOW E&M-EST. PATIENT-LVL III: CPT | Mod: PBBFAC,,, | Performed by: INTERNAL MEDICINE

## 2024-01-22 PROCEDURE — 3008F BODY MASS INDEX DOCD: CPT | Mod: CPTII,S$GLB,, | Performed by: INTERNAL MEDICINE

## 2024-01-22 PROCEDURE — 3074F SYST BP LT 130 MM HG: CPT | Mod: CPTII,S$GLB,, | Performed by: INTERNAL MEDICINE

## 2024-01-22 PROCEDURE — 99204 OFFICE O/P NEW MOD 45 MIN: CPT | Mod: S$GLB,,, | Performed by: INTERNAL MEDICINE

## 2024-01-22 PROCEDURE — 87329 GIARDIA AG IA: CPT | Performed by: INTERNAL MEDICINE

## 2024-01-22 PROCEDURE — 3078F DIAST BP <80 MM HG: CPT | Mod: CPTII,S$GLB,, | Performed by: INTERNAL MEDICINE

## 2024-01-22 RX ORDER — SULFAMETHOXAZOLE AND TRIMETHOPRIM 800; 160 MG/1; MG/1
1 TABLET ORAL 2 TIMES DAILY
COMMUNITY
Start: 2024-01-10 | End: 2024-01-22 | Stop reason: ALTCHOICE

## 2024-01-22 RX ORDER — ONDANSETRON 4 MG/1
4 TABLET, ORALLY DISINTEGRATING ORAL EVERY 8 HOURS PRN
Qty: 20 TABLET | Refills: 3 | Status: SHIPPED | OUTPATIENT
Start: 2024-01-22 | End: 2024-05-20 | Stop reason: SDUPTHER

## 2024-01-22 RX ORDER — ONDANSETRON 4 MG/1
4 TABLET, ORALLY DISINTEGRATING ORAL
COMMUNITY
Start: 2024-01-16 | End: 2024-01-22 | Stop reason: SDUPTHER

## 2024-01-22 RX ORDER — OFATUMUMAB 20 MG/.4ML
INJECTION, SOLUTION SUBCUTANEOUS
COMMUNITY

## 2024-01-22 NOTE — PROGRESS NOTES
"GENERAL GI PATIENT INTAKE:    COVID symptoms in the last 7 days (runny nose, sore throat, congestion, cough, fever): No  PCP: Chris Rendon  If not PCP-  number given to establish [phone]: No    ALLERGIES REVIEWED:  Yes    CHIEF COMPLAINT:    Chief Complaint   Patient presents with    Initial Visit    Emesis     After eating        VITAL SIGNS:  Ht 5' 4" (1.626 m)   Wt 82.1 kg (181 lb)   LMP 01/01/2024   BMI 31.07 kg/m²      Change in medical, surgical, family or social history: Yes      REVIEWED MEDICATION LIST RECONCILED INCLUDING ABOVE MEDS:  Yes     "

## 2024-01-22 NOTE — PROGRESS NOTES
[facility] Consultation Note    Reason for Consult:  The primary encounter diagnosis was Nausea and vomiting, unspecified vomiting type. Diagnoses of Abdominal bloating and Diarrhea of infectious origin were also pertinent to this visit.    PCP:   Chris Rendon   [address] / [place] [address]    Referring MD:  Lei Naranjo Md  [address] [place] [id number]    Initial History of Present Illness (HPI):  This is a 23 y.o. female here for evaluation of nausea/vomiting  Happening qod after eating chicken wings 3 weeks ago, burning abd pain + n/v  Taking some zofran    Abdominal pain - no  Reflux - no  Dysphagia - no   Bowel habits -some loose stools  GI bleeding - none  NSAID usage - none        ROS:  Constitutional: No fevers, occasional chills with vomiting, No weight loss  ENT: No allergies  CV: No chest pain  Pulm: No cough, No shortness of breath  Ophtho: No vision changes  GI: see HPI  Derm: No rash  Heme: No lymphadenopathy, No bruising  MSK: No arthritis  : No dysuria, No hematuria  Endo: No hot or cold intolerance  Neuro: No syncope, No seizure  Psych: No anxiety, No depression    Medical History:  has a past medical history of Factor 5 Leiden mutation, heterozygous (09/13/2016), Multiple sclerosis (04/2023), and Seizures.    Surgical History:  has a past surgical history that includes Tonsillectomy (6 years old).    Family History: family history includes Congenital heart disease in her maternal grandfather; Crohn's disease in her paternal grandmother; Factor V Leiden deficiency in her maternal grandmother, mother, and sister; Hypertension in her mother; Interstitial cystitis in her maternal aunt and mother; Irregular menses in her mother; Mitral valve prolapse in her maternal grandfather; No Known Problems in her father; Thyroid disease in her maternal grandmother..     Social History:  reports that she has never smoked. She has never used smokeless tobacco.  "She reports that she does not currently use alcohol. She reports that she does not use drugs.    Review of patient's allergies indicates:   Allergen Reactions    Hazelnut Hives and Rash       Medication List with Changes/Refills   Current Medications    CHOLECALCIFEROL, VITAMIN D3, 1,250 MCG (50,000 UNIT) CAPSULE    Take 1 capsule (50,000 Units total) by mouth every 7 days.    KESIMPTA PEN 20 MG/0.4 ML PNIJ    every 30 days.    OMEPRAZOLE (PRILOSEC) 20 MG CAPSULE    Take 1 capsule (20 mg total) by mouth once daily.    ONDANSETRON (ZOFRAN-ODT) 4 MG TBDL    Take 4 mg by mouth.   Discontinued Medications    AZELASTINE (ASTELIN) 137 MCG (0.1 %) NASAL SPRAY    1 spray (137 mcg total) by Nasal route 2 (two) times daily.    BENZONATATE (TESSALON) 100 MG CAPSULE    Take 1 capsule (100 mg total) by mouth 3 (three) times daily as needed for Cough.    DEXTROAMPHETAMINE-AMPHETAMINE 10 MG TAB    Take 1 tablet (10 mg total) by mouth 2 (two) times a day.    SULFAMETHOXAZOLE-TRIMETHOPRIM 800-160MG (BACTRIM DS) 800-160 MG TAB    Take 1 tablet by mouth 2 (two) times daily.         Objective Findings:    Vital Signs:  /74   Pulse 72   Ht 5' 4" (1.626 m)   Wt 82.1 kg (181 lb)   LMP 01/01/2024   BMI 31.07 kg/m²   Body mass index is 31.07 kg/m².    Physical Exam:  General Appearance: Well appearing in no acute distress  Head:   Normocephalic, without obvious abnormality  Eyes:    No scleral icterus, EOMI  ENT: Neck supple, Lips, mucosa, and tongue normal; teeth and gums normal  Lungs: CTA bilaterally in anterior and posterior fields, no wheezes, no crackles.  Heart:  Regular rate and rhythm, S1, S2 normal, no murmurs heard  Abdomen: Soft, non tender, non distended with positive bowel sounds in all four quadrants. No hepatosplenomegaly, ascites, or mass  Extremities: 2+ pulses, no clubbing, cyanosis or edema  Skin: No rash  Neurologic: CN II-XII intact      Labs:  Lab Results   Component Value Date    WBC 17.46 (H) 01/18/2024 " "   HGB 16.8 (H) 01/18/2024    HCT 51.1 (H) 01/18/2024     01/18/2024    CHOL 169 09/22/2022    TRIG 54 09/22/2022    HDL 40 09/22/2022    ALT 29 01/18/2024    AST 20 01/18/2024     01/18/2024    K 3.7 01/18/2024     01/18/2024    CREATININE 0.7 01/18/2024    BUN 8 01/18/2024    CO2 24 01/18/2024    TSH 0.989 01/24/2023    INR 1.0 09/09/2016    HGBA1C 4.8 09/22/2022       No results found for: "HPYLORINTERP"  No results found for: "HPYLORIANTIG"        Imaging:  U/s 3/23 - gallstones  CT - gastric distention most recently, mult CT's reviewed  Endoscopy:          Assessment:  1. Nausea and vomiting, unspecified vomiting type    2. Abdominal bloating    3. Diarrhea of infectious origin           Recommendations:  1. Check gastric emptying to see if she has developed a post infectious gastroparesis  2. Stools for giardia and parasites with recent international travel. Add on to H pylori which is unlikely to cause these acute symptoms  3. EGD After #1 done  4. Ok to start PPI    No follow-ups on file.      Order summary:         Thank you so much for allowing me to participate in the care of Zeny Naranjo MD        "

## 2024-01-23 LAB
CRYPTOSP AG STL QL IA: NEGATIVE
G LAMBLIA AG STL QL IA: NEGATIVE

## 2024-01-25 ENCOUNTER — PATIENT MESSAGE (OUTPATIENT)
Dept: GASTROENTEROLOGY | Facility: CLINIC | Age: 24
End: 2024-01-25
Payer: COMMERCIAL

## 2024-01-25 LAB — O+P STL MICRO: NORMAL

## 2024-02-05 ENCOUNTER — TELEPHONE (OUTPATIENT)
Dept: GASTROENTEROLOGY | Facility: CLINIC | Age: 24
End: 2024-02-05
Payer: COMMERCIAL

## 2024-02-05 NOTE — TELEPHONE ENCOUNTER
----- Message from Justine Quiroz sent at 2/5/2024  9:06 AM CST -----  Regarding: appt access  Contact: pt [phone]  Pt calling to schedule Gastric Emptying test. Pls call

## 2024-02-14 ENCOUNTER — HOSPITAL ENCOUNTER (OUTPATIENT)
Dept: RADIOLOGY | Facility: HOSPITAL | Age: 24
Discharge: HOME | End: 2024-02-14
Attending: INTERNAL MEDICINE
Payer: COMMERCIAL

## 2024-02-14 DIAGNOSIS — R11.2 NAUSEA AND VOMITING, UNSPECIFIED VOMITING TYPE: ICD-10-CM

## 2024-02-14 PROCEDURE — 78264 GASTRIC EMPTYING IMG STUDY: CPT | Mod: 26,,, | Performed by: STUDENT IN AN ORGANIZED HEALTH CARE EDUCATION/TRAINING PROGRAM

## 2024-02-14 PROCEDURE — 78264 GASTRIC EMPTYING IMG STUDY: CPT | Mod: TC

## 2024-02-14 PROCEDURE — A9541 TC99M SULFUR COLLOID: HCPCS

## 2024-02-20 ENCOUNTER — TELEPHONE (OUTPATIENT)
Dept: ENDOSCOPY | Facility: HOSPITAL | Age: 24
End: 2024-02-20
Payer: COMMERCIAL

## 2024-02-20 ENCOUNTER — PATIENT MESSAGE (OUTPATIENT)
Dept: GASTROENTEROLOGY | Facility: CLINIC | Age: 24
End: 2024-02-20
Payer: COMMERCIAL

## 2024-02-20 NOTE — TELEPHONE ENCOUNTER
"Telephoned pt to schedule EGD with no answer.  Voicemail message left with direct contact number and endoscopy scheduling number for pt to return call.  Spoke with pt's mother, Dev, and she does not want to schedule procedure, however states [place] is out of the country and should arrive home tomorrow.  Mother states she will have pt call to schedule.      From: Lei Naranjo MD   Sent: 2024   8:50 AM CST   To: [facility]     Procedure: EGD     Diagnosis: Epigastric Abdominal pain     Procedure Timin-4 weeks     *If within 4 weeks selected, please callie as high priority*     *If greater than 12 weeks, please select "4-12 weeks" and delay sending until 2 months prior to requested date*     Provider: Myself     Location: No Preference     Additional Scheduling Information: No scheduling concerns     Prep Specifications:N/A     Have you attached a patient to this message: yes   "

## 2024-02-22 ENCOUNTER — TELEPHONE (OUTPATIENT)
Dept: ENDOSCOPY | Facility: HOSPITAL | Age: 24
End: 2024-02-22
Payer: COMMERCIAL

## 2024-02-22 DIAGNOSIS — R10.13 EPIGASTRIC ABDOMINAL PAIN: Primary | ICD-10-CM

## 2024-02-22 NOTE — TELEPHONE ENCOUNTER
"From: Lei Naranjo MD   Sent: 2024   8:50 AM CST   To: [facility]     Procedure: EGD     Diagnosis: Epigastric Abdominal pain     Procedure Timin-4 weeks     *If within 4 weeks selected, please callie as high priority*     *If greater than 12 weeks, please select "4-12 weeks" and delay sending until 2 months prior to requested date*     Provider: Myself     Location: No Preference     Additional Scheduling Information: No scheduling concerns     Prep Specifications:N/A     Have you attached a patient to this message: yes   "

## 2024-02-22 NOTE — TELEPHONE ENCOUNTER
Spoke to patient to reschedule procedure(s) Upper Endoscopy (EGD)       Physician to perform procedure(s) Dr. CEZAR Naranjo  Date of Procedure (s) 4/24/24  Arrival Time 11:30 AM  Time of Procedure(s) 12:30 PM   Location of Procedure(s) [address]  Type of Rx Prep sent to patient: N/A  Instructions provided to patient via [facility]    Patient was informed on the following information and verbalized understanding. Screening questionnaire reviewed with patient and complete. If procedure requires anesthesia, a responsible adult needs to be present to accompany the patient home, patient cannot drive after receiving anesthesia. Appointment details are tentative, especially check-in time. Patient will receive a prep-op call 7 days prior to confirm check-in time for procedure. If applicable the patient should contact their pharmacy to verify Rx for procedure prep is ready for pick-up. Patient was advised to call the scheduling department at [phone] if pharmacy states no Rx is available. Patient was advised to call the endoscopy scheduling department if any questions or concerns arise.       Endoscopy Scheduling Department

## 2024-02-22 NOTE — TELEPHONE ENCOUNTER
Spoke to pt to schedule procedure(s) Upper Endoscopy (EGD)       Physician to perform procedure(s) Dr. CEZAR Naranjo  Date of Procedure (s) 2/29/24  Arrival Time 9:45 AM  Time of Procedure(s) 10:45 AM   Location of Procedure(s) [facility] 2nd Floor--  Enter at the rear of the building through the emergency department screening station or the Outpatient Registration door, then continue to endoscopy department on the 2nd floor.    Type of Rx Prep sent to patient: N/A  Instructions provided to patient via [facility]    Patient was informed on the following information and verbalized understanding. Screening questionnaire reviewed with patient and complete. If procedure requires anesthesia, a responsible adult needs to be present to accompany the patient home, patient cannot drive after receiving anesthesia. Appointment details are tentative, especially check-in time. Patient will receive a prep-op call 7 days prior to confirm check-in time for procedure. If applicable the patient should contact their pharmacy to verify Rx for procedure prep is ready for pick-up. Patient was advised to call the scheduling department at [phone] if pharmacy states no Rx is available. Patient was advised to call the endoscopy scheduling department if any questions or concerns arise.      [facility]oscopy Scheduling Department

## 2024-02-26 ENCOUNTER — OFFICE VISIT (OUTPATIENT)
Dept: FAMILY MEDICINE | Facility: CLINIC | Age: 24
End: 2024-02-26
Payer: COMMERCIAL

## 2024-02-26 DIAGNOSIS — K21.9 GASTROESOPHAGEAL REFLUX DISEASE, UNSPECIFIED WHETHER ESOPHAGITIS PRESENT: Primary | ICD-10-CM

## 2024-02-26 PROCEDURE — 99214 OFFICE O/P EST MOD 30 MIN: CPT | Mod: 95,,, | Performed by: FAMILY MEDICINE

## 2024-02-26 RX ORDER — SUCRALFATE 1 G/1
1 TABLET ORAL 2 TIMES DAILY PRN
Qty: 60 TABLET | Refills: 0 | Status: SHIPPED | OUTPATIENT
Start: 2024-02-26

## 2024-02-26 RX ORDER — PANTOPRAZOLE SODIUM 20 MG/1
20 TABLET, DELAYED RELEASE ORAL DAILY
Qty: 30 TABLET | Refills: 0 | Status: SHIPPED | OUTPATIENT
Start: 2024-02-26 | End: 2025-02-25

## 2024-02-26 NOTE — LETTER
February 26, 2024      [facility]  [address] [facility]  [place] [address]  Phone: [phone]  Fax: [phone]       Patient: Zeny Charles   YOB: 2000  Date of Visit: 02/26/2024    To Whom It May Concern:    Susana Charles  was at [facility] on 02/26/2024. Please excuse 2/20 - 2/24. The patient may return to work/school on tomorrow   with no restrictions. If you have any questions or concerns, or if I can be of further assistance, please do not hesitate to contact me.    Sincerely,      Chris Rendon MD

## 2024-02-26 NOTE — PROGRESS NOTES
[facility]  Progress Note    SUBJECTIVE:     Chief Complaint   Patient presents with    Gastroesophageal Reflux              The patient location is: Home  The chief complaint leading to consultation is: Gastroesophageal Reflux (/)    Visit type: Virtual visit with synchronous audio and video  Total time spent with patient: 25  Each patient to whom he or she provides medical services by telemedicine is:  (1) informed of the relationship between the physician and patient and the respective role of any other health care provider with respect to management of the patient; and (2) notified that he or she may decline to receive medical services by telemedicine and may withdraw from such care at any time.      HPI: Patient is a 23 y.o. female via virtual visit, here for c/o abdominal pain. Started last week which she admits her diet is bad (eating spicy, fried foods and caffeine). Had some nausea and vomiting. No blood in vomit or stool. Hasn't taken anything for it.     Review of patient's allergies indicates:   Allergen Reactions    Hazelnut Hives and Rash       Past Medical History:   Diagnosis Date    Factor 5 Leiden mutation, heterozygous 09/13/2016    Multiple sclerosis 04/2023    Seizures     1 at the age of 10     Past Surgical History:   Procedure Laterality Date    TONSILLECTOMY  6 years old     Family History   Problem Relation Age of Onset    Hypertension Mother     Irregular menses Mother     Factor V Leiden deficiency Mother     Interstitial cystitis Mother     No Known Problems Father     Factor V Leiden deficiency Sister     Interstitial cystitis Maternal Aunt     Factor V Leiden deficiency Maternal Grandmother     Thyroid disease Maternal Grandmother         hypothyroidism    Mitral valve prolapse Maternal Grandfather     Congenital heart disease Maternal Grandfather         valve    Crohn's disease Paternal Grandmother     Breast cancer Neg Hx     Colon cancer Neg Hx     Ovarian cancer Neg Hx      Arrhythmia Neg Hx     Early death Neg Hx     Heart attacks under age 50 Neg Hx     Pacemaker/defibrilator Neg Hx     Esophageal cancer Neg Hx      Social History     Tobacco Use    Smoking status: Never    Smokeless tobacco: Never   Substance Use Topics    Alcohol use: Not Currently     Comment: social drink once sa month per the patient    Drug use: No        Review of Systems   Constitutional:  Negative for chills and fever.   HENT: Negative.     Respiratory: Negative.  Negative for shortness of breath.    Cardiovascular: Negative.  Negative for chest pain.   Gastrointestinal:  Positive for abdominal pain, heartburn, nausea and vomiting. Negative for blood in stool, constipation, diarrhea and melena.   Genitourinary: Negative.    Neurological:  Negative for headaches.   All other systems reviewed and are negative.    OBJECTIVE:   There were no vitals filed for this visit.  There is no height or weight on file to calculate BMI.    Physical Exam  Constitutional:       General: She is not in acute distress.     Appearance: Normal appearance. She is not diaphoretic.   HENT:      Head: Normocephalic and atraumatic.   Eyes:      Conjunctiva/sclera: Conjunctivae normal.   Pulmonary:      Effort: Pulmonary effort is normal.   Neurological:      Mental Status: She is alert and oriented to person, place, and time.         Old records were reviewed. Labs and/or images were independently reviewed.    ASSESSMENT     1. Gastroesophageal reflux disease, unspecified whether esophagitis present        PLAN:     Gastroesophageal reflux disease, unspecified whether esophagitis present  -     Start pantoprazole (PROTONIX) 20 MG tablet; Take 1 tablet (20 mg total) by mouth once daily.  Dispense: 30 tablet; Refill: 0  -     sucralfate (CARAFATE) 1 gram tablet; Take 1 tablet (1 g total) by mouth 2 (two) times daily as needed.  Dispense: 60 tablet; Refill: 0  -    I have discussed the common side effects of this medication with the  patient and answered all of the questions they had at the time of this visit regarding this medication.      RTC PRKYARA Rendon MD  02/26/2024 3:57 PM

## 2024-02-27 ENCOUNTER — OFFICE VISIT (OUTPATIENT)
Dept: DERMATOLOGY | Facility: CLINIC | Age: 24
End: 2024-02-27
Payer: COMMERCIAL

## 2024-02-27 DIAGNOSIS — L72.0 MILIA: ICD-10-CM

## 2024-02-27 DIAGNOSIS — L21.9 SEBORRHEIC DERMATITIS: ICD-10-CM

## 2024-02-27 DIAGNOSIS — L68.9 HYPERTRICHOSIS: ICD-10-CM

## 2024-02-27 DIAGNOSIS — L73.8 PSEUDOFOLLICULITIS: Primary | ICD-10-CM

## 2024-02-27 DIAGNOSIS — L81.0 POST-INFLAMMATORY HYPERPIGMENTATION: ICD-10-CM

## 2024-02-27 PROCEDURE — 1160F RVW MEDS BY RX/DR IN RCRD: CPT | Mod: CPTII,S$GLB,, | Performed by: DERMATOLOGY

## 2024-02-27 PROCEDURE — 1159F MED LIST DOCD IN RCRD: CPT | Mod: CPTII,S$GLB,, | Performed by: DERMATOLOGY

## 2024-02-27 PROCEDURE — 99214 OFFICE O/P EST MOD 30 MIN: CPT | Mod: S$GLB,,, | Performed by: DERMATOLOGY

## 2024-02-27 RX ORDER — CLINDAMYCIN PHOSPHATE 10 UG/ML
LOTION TOPICAL
Qty: 60 ML | Refills: 3 | Status: SHIPPED | OUTPATIENT
Start: 2024-02-27

## 2024-02-27 RX ORDER — KETOCONAZOLE 20 MG/G
CREAM TOPICAL
Qty: 60 G | Refills: 5 | Status: SHIPPED | OUTPATIENT
Start: 2024-02-27

## 2024-02-27 RX ORDER — MEDROXYPROGESTERONE ACETATE 150 MG/ML
150 INJECTION, SUSPENSION INTRAMUSCULAR
COMMUNITY
Start: 2024-02-07 | End: 2025-02-06

## 2024-02-27 NOTE — PROGRESS NOTES
Patient Information  Name: Zeny Charles  : 2000  MRN: [id number]     Referring Physician:  Self   Primary Care Physician:  Chris Rendon MD   Date of Visit: 2024      Subjective:     History of Present lllness:    Zeny Chalres is a 23 y.o. female who presents with a chief complaint of acne, dry skin, and bump.    Acne  Location: under chin/neck  Duration: 6 months  Signs/Symptoms: acne  Exacerbating factors: waxing  Relieving factors/Prior treatments: OTC acne face wash    Dry skin  Location: corners of nose  Duration: 9 months  Signs/Symptoms: dry skin, not itchy, flaking  Exacerbating factors: none  Relieving factors/Prior treatments: OTC moisturizers    Bump  Location: left eyelid on lash line  Duration: 1 year  Signs/Symptoms: bump, stable since it appeared  Exacerbating factors: none  Relieving factors/Prior treatments: none    Clinical documentation obtained by nursing staff reviewed.    Review of Systems   Skin:  Positive for dry skin and activity-related sunscreen use. Negative for itching, rash, daily sunscreen use and wears hat.       Objective:   Physical Exam   Constitutional: She appears well-developed and well-nourished. No distress.   Neurological: She is alert and oriented to person, place, and time. She is not disoriented.   Psychiatric: She has a normal mood and affect.   Skin:   Areas Examined (abnormalities noted in diagram):   Head / Face Inspection Performed  Neck Inspection Performed            Diagram Legend     Erythematous scaling macule/papule c/w actinic keratosis       Vascular papule c/w angioma      Pigmented verrucoid papule/plaque c/w seborrheic keratosis      Yellow umbilicated papule c/w sebaceous hyperplasia      Irregularly shaped tan macule c/w lentigo     1-2 mm smooth white papules consistent with Milia      Movable subcutaneous cyst with punctum c/w epidermal inclusion cyst      Subcutaneous movable cyst c/w pilar cyst      Firm pink to brown papule c/w  dermatofibroma      Pedunculated fleshy papule(s) c/w skin tag(s)      Evenly pigmented macule c/w junctional nevus     Mildly variegated pigmented, slightly irregular-bordered macule c/w mildly atypical nevus      Flesh colored to evenly pigmented papule c/w intradermal nevus       Pink pearly papule/plaque c/w basal cell carcinoma      Erythematous hyperkeratotic cursted plaque c/w SCC      Surgical scar with no sign of skin cancer recurrence      Open and closed comedones      Inflammatory papules and pustules      Verrucoid papule consistent consistent with wart     Erythematous eczematous patches and plaques     Dystrophic onycholytic nail with subungual debris c/w onychomycosis     Umbilicated papule    Erythematous-base heme-crusted tan verrucoid plaque consistent with inflamed seborrheic keratosis     Erythematous Silvery Scaling Plaque c/w Psoriasis     See annotation    No images are attached to the encounter or orders placed in the encounter.      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  Dispense: 60 mL; Refill: 3  Recommended an OTC benzoyl peroxide (2-5%) wash, such as CeraVe Acne Foaming Cream Cleanser, PanOxyl 4% Creamy Wash, or Neutrogena Clear Pore Cleanser/Mask. It may be best to use benzoyl peroxide while in the shower and to dry off with a white towel, as it can bleach towels, sheets, and clothing if not rinsed well from the skin. Use benzoyl peroxide wash at least 2-3 times per week to prevent bacterial resistance.    Seborrheic dermatitis  - chronic problem, not at treatment goal  Seborrheic dermatitis is a common skin condition that usually lasts for years. It may be caused by multiple factors, including  the yeast that normally lives on our skin, our genes, a cold and dry climate, stress, and a persons overall health.  -     ketoconazole (NIZORAL) 2 % cream; Apply to affected areas of face BID prn scaling.  Dispense: 60 g; Refill: 5    Post-inflammatory hyperpigmentation  This is a normal discoloration of the skin after an inflammatory process has resolved. It may take months to years to fade.  Recommend using a broad-spectrum, water-resistant sunscreen with SPF of 30 or higher--reapply every 2 hours. Seek shade and wear sun-protective clothing/hat.    Milia  This is a small, benign cyst. Reassurance provided. No treatment is necessary unless it is symptomatic. These may resolve on their own.    Hypertrichosis  Recommended and discussed risks, benefits, alternatives, and side effects of laser hair reduction.      Follow up for laser.      Naida Watson MD, FAAD  [facility]

## 2024-02-27 NOTE — PATIENT INSTRUCTIONS
-Recommended an OTC benzoyl peroxide (2-5%) wash, such as CeraVe Acne Foaming Cream Cleanser, PanOxyl 4% Creamy Wash, or Neutrogena Clear Pore Cleanser/Mask. It may be best to use benzoyl peroxide while in the shower and to dry off with a white towel, as it can bleach towels, sheets, and clothing if not rinsed well from the skin. Use benzoyl peroxide wash at least 2-3 times per week to prevent bacterial resistance.    -Recommended a sulfur soap (such as Sulfo Lo) or a pyrithione zinc soap (such as Vanicream Z-Bar).

## 2024-02-29 ENCOUNTER — TELEPHONE (OUTPATIENT)
Dept: DERMATOLOGY | Facility: CLINIC | Age: 24
End: 2024-02-29
Payer: COMMERCIAL

## 2024-02-29 NOTE — TELEPHONE ENCOUNTER
----- Message from Mercy Garcia sent at 2/29/2024 10:30 AM CST -----  Regarding: appt  Contact: [phone]  Pt is calling to rs appt 3/8. No available dates. Please call

## 2024-03-01 RX ORDER — TIRZEPATIDE 7.5 MG/.5ML
7.5 INJECTION, SOLUTION SUBCUTANEOUS WEEKLY
Qty: 12 PEN | Refills: 1 | Status: SHIPPED | OUTPATIENT
Start: 2024-03-01 | End: 2024-03-08

## 2024-03-01 NOTE — TELEPHONE ENCOUNTER
Refill Routing Note   Medication(s) are not appropriate for processing by [facility] for the following reason(s):        No active prescription written by provider  Required labs outdated    [facility] action(s):  Defer             Appointments  past 12m or future 3m with PCP    Date Provider   Last Visit   2/26/2024 Chris Renodn MD   Next Visit   Visit date not found Chris Rendon MD   ED visits in past 90 days: 0        Note composed:10:28 AM 03/01/2024

## 2024-03-01 NOTE — TELEPHONE ENCOUNTER
No care due was identified.  [facility] Embedded Care Due Messages. Reference number: [id number].   3/01/2024 10:03:16 AM CST

## 2024-03-05 ENCOUNTER — TELEPHONE (OUTPATIENT)
Dept: DERMATOLOGY | Facility: CLINIC | Age: 24
End: 2024-03-05
Payer: COMMERCIAL

## 2024-03-05 ENCOUNTER — OFFICE VISIT (OUTPATIENT)
Dept: PSYCHIATRY | Facility: CLINIC | Age: 24
End: 2024-03-05
Payer: COMMERCIAL

## 2024-03-05 DIAGNOSIS — F90.0 ADHD (ATTENTION DEFICIT HYPERACTIVITY DISORDER), INATTENTIVE TYPE: Primary | ICD-10-CM

## 2024-03-05 PROCEDURE — 99213 OFFICE O/P EST LOW 20 MIN: CPT | Mod: 95,,, | Performed by: NURSE PRACTITIONER

## 2024-03-05 RX ORDER — DEXTROAMPHETAMINE SACCHARATE, AMPHETAMINE ASPARTATE, DEXTROAMPHETAMINE SULFATE AND AMPHETAMINE SULFATE 2.5; 2.5; 2.5; 2.5 MG/1; MG/1; MG/1; MG/1
10 TABLET ORAL 2 TIMES DAILY
Qty: 60 TABLET | Refills: 0 | Status: SHIPPED | OUTPATIENT
Start: 2024-05-04

## 2024-03-05 RX ORDER — DEXTROAMPHETAMINE SACCHARATE, AMPHETAMINE ASPARTATE, DEXTROAMPHETAMINE SULFATE AND AMPHETAMINE SULFATE 2.5; 2.5; 2.5; 2.5 MG/1; MG/1; MG/1; MG/1
10 TABLET ORAL 2 TIMES DAILY
Qty: 60 TABLET | Refills: 0 | Status: SHIPPED | OUTPATIENT
Start: 2024-04-04

## 2024-03-05 RX ORDER — DEXTROAMPHETAMINE SACCHARATE, AMPHETAMINE ASPARTATE, DEXTROAMPHETAMINE SULFATE AND AMPHETAMINE SULFATE 2.5; 2.5; 2.5; 2.5 MG/1; MG/1; MG/1; MG/1
10 TABLET ORAL 2 TIMES DAILY
Qty: 60 TABLET | Refills: 0 | Status: SHIPPED | OUTPATIENT
Start: 2024-03-05

## 2024-03-05 NOTE — TELEPHONE ENCOUNTER
----- Message from Bao Roman MA sent at 3/4/2024  1:54 PM CST -----  [phone] (Today, 10:58 AM)  Pt is calling to rs appt w the provider 3/9. Please call would like to discuss appt.

## 2024-03-05 NOTE — PROGRESS NOTES
"Outpatient Psychiatry Follow-Up Visit (MD/NP)    3/5/2024    Clinical Status of Patient:  Outpatient (Ambulatory)    Chief Complaint:  Zeny Charles is a 23 y.o. female who presents today for follow-up of attention problems.  Met with patient.      Last visit was: 5/25/22. Chart and  reviewed  The patient location is: home  The chief complaint leading to consultation is: attention problems    Visit type: audiovisual    Face to Face time with patient: 30 minutes  30 minutes of total time spent on the encounter, which includes face to face time and non-face to face time preparing to see the patient (eg, review of tests), Obtaining and/or reviewing separately obtained history, Documenting clinical information in the electronic or other health record, Independently interpreting results (not separately reported) and communicating results to the patient/family/caregiver, or Care coordination (not separately reported).     Each patient to whom he or she provides medical services by telemedicine is:  (1) informed of the relationship between the physician and patient and the respective role of any other health care provider with respect to management of the patient; and (2) notified that he or she may decline to receive medical services by telemedicine and may withdraw from such care at any time.    Interval History and Content of Current Session:  Current Psychiatric MedicatioCounseling this visit focused on building adaptive coping skills, medication teaching, and cognitive behavioral therapy (CBT)ns/changes  Continue Adderall 10 mg po BID  Start Lexparo 10 mg daily      Virtual Visit:  Reports she is no longer taking Lexparo. Reports mood and anxiety improved, Stated, "I think it was a seasonal thing".   Pt reports she is doing well with Adderall for managing ADHD sx. Thought processes are linear, clear, and organized. Denies SI/HI/AVH.     Psychotherapy:  Target symptoms: distractability  Why chosen therapy is " appropriate versus another modality: relevant to diagnosis  Outcome monitoring methods: self-report  Therapeutic intervention type: insight oriented psychotherapy  Topics discussed/themes: building skills sets for symptom management, symptom recognition  The patient's response to the intervention is accepting. The patient's progress toward treatment goals is good.   Duration of intervention: 14 minutes.    Review of Systems   PSYCHIATRIC: Pertinant items are noted in the narrative.  CONSTITUTIONAL: No weight gain or loss.   MUSCULOSKELETAL: No pain or stiffness of the joints.  NEUROLOGIC: No weakness, sensory changes, seizures, confusion, memory loss, tremor or other abnormal movements.  ENDOCRINE: No polydipsia or polyuria.  INTEGUMENTARY: No rashes or lacerations.  EYES: No exophthalmos, jaundice or blindness.  ENT: No dizziness, tinnitus or hearing loss.  RESPIRATORY: No shortness of breath.  CARDIOVASCULAR: No tachycardia or chest pain.  GASTROINTESTINAL: No nausea, vomiting, pain, constipation or diarrhea.  GENITOURINARY: No frequency, dysuria or sexual dysfunction.  HEMATOLOGIC/LYMPHATIC: No excessive bleeding, prolonged or excessive bleeding after dental extraction/injury.  ALLERGIC/IMMUNOLOGIC: No allergic response to materials, foods or animals at this time.    Past Medical, Family and Social History: The patient's past medical, family and social history have been reviewed and updated as appropriate within the electronic medical record - see encounter notes.    Compliance: yes    Side effects: None    Risk Parameters:  Patient reports no suicidal ideation  Patient reports no homicidal ideation  Patient reports no self-injurious behavior  Patient reports no violent behavior    Exam (detailed: at least 9 elements; comprehensive: all 15 elements)   Constitutional  Vitals:  Most recent vital signs, dated greater than 90 days prior to this appointment, were reviewed.   There were no vitals filed for this  visit.     General:  unremarkable, age appropriate     Musculoskeletal  Muscle Strength/Tone:  not examined   Gait & Station:  non-ataxic     Psychiatric  Speech:  no latency; no press   Mood & Affect:  steady  congruent and appropriate   Thought Process:  normal and logical   Associations:  intact   Thought Content:  normal, no suicidality, no homicidality, delusions, or paranoia   Insight:  intact   Judgement: behavior is adequate to circumstances   Orientation:  grossly intact   Memory: intact for content of interview   Language: grossly intact   Attention Span & Concentration:  able to focus   Fund of Knowledge:  intact and appropriate to age and level of education     Assessment and Diagnosis   Status/Progress: Based on the examination today, the patient's problem(s) is/are well controlled.  New problems have been presented today (anxiety).   Co-morbidities and Lack of compliance are not complicating management of the primary condition.  There are no active rule-out diagnoses for this patient at this time.     General Impression:       ICD-10-CM ICD-9-CM   1. ADHD (attention deficit hyperactivity disorder), inattentive type  F90.0 314.00       Intervention/Counseling/Treatment Plan   Medication Management: The risks and benefits of medication were discussed with the patient.  Continue Adderall 10 mg po BID    Return to Clinic: 3 months    Risks, benefits, side effects and alternative treatments discussed with patient. Patient agrees with the current plan as documented.  Encouraged Patient to keep future appointments.  Take medications as prescribed and abstain from substance abuse.  Pt to present to ED for thoughts to harm herself or others

## 2024-03-06 ENCOUNTER — TELEPHONE (OUTPATIENT)
Dept: DERMATOLOGY | Facility: CLINIC | Age: 24
End: 2024-03-06
Payer: COMMERCIAL

## 2024-03-06 ENCOUNTER — PATIENT MESSAGE (OUTPATIENT)
Dept: DERMATOLOGY | Facility: CLINIC | Age: 24
End: 2024-03-06
Payer: COMMERCIAL

## 2024-03-06 ENCOUNTER — PATIENT MESSAGE (OUTPATIENT)
Dept: FAMILY MEDICINE | Facility: CLINIC | Age: 24
End: 2024-03-06
Payer: COMMERCIAL

## 2024-03-06 DIAGNOSIS — E66.01 SEVERE OBESITY (BMI 35.0-39.9) WITH COMORBIDITY: Primary | ICD-10-CM

## 2024-03-06 NOTE — TELEPHONE ENCOUNTER
----- Message from Cristina Noble sent at 3/5/2024 10:49 AM CST -----  Contact: Pt [phone]  Pt is calling to state appt can be schedule on a mon or wed states she is in school so phone is on silent. Pt is asking for office to just schedule a appt please call

## 2024-03-07 NOTE — TELEPHONE ENCOUNTER
Spoke with [facility] pharmacist phoung. Pharmacist states that if the patient is trying to use the coupon for the prescription they are not able to process it due to the coupon website getting hack and not sure of when it will be back  online. Pharmacist states that she can pay it out of pocket but will not be able to use the coupon.

## 2024-03-08 RX ORDER — TIRZEPATIDE 7.5 MG/.5ML
7.5 INJECTION, SOLUTION SUBCUTANEOUS
Qty: 4 PEN | Refills: 3 | Status: SHIPPED | OUTPATIENT
Start: 2024-03-08

## 2024-03-12 ENCOUNTER — OFFICE VISIT (OUTPATIENT)
Dept: FAMILY MEDICINE | Facility: CLINIC | Age: 24
End: 2024-03-12
Payer: COMMERCIAL

## 2024-03-12 DIAGNOSIS — N92.6 IRREGULAR MENSTRUAL BLEEDING: ICD-10-CM

## 2024-03-12 DIAGNOSIS — N92.1 BREAKTHROUGH BLEEDING ON DEPO-PROVERA: Primary | ICD-10-CM

## 2024-03-12 PROCEDURE — 99213 OFFICE O/P EST LOW 20 MIN: CPT | Mod: 95,,,

## 2024-03-12 NOTE — PATIENT INSTRUCTIONS
Medical Fitness--[phone]  Imaging, Xray, CT, MRI, Ultrasound---[phone]  Bariatrics---[phone]  Breast Surgery---[phone]  Case Management---[phone]  Colonoscopy---[phone]  DME---[phone]  Infectious Disease---[phone]  Interventional Radiology---[phone]  Medical Records---[phone]  [facility]---[phone]  Optometry/Ophthalmology---[phone]  [facility]---[phone]  Physical Therapy---[phone]  Psychiatry---[phone] or [phone]  Plastic Surgery---[phone]  Recovery--[phone] option 2, or [phone].  Sleep Study---[phone]  Smoking Cessation---[phone]  Wound Care---[phone]  Referral Desk---[phone]

## 2024-03-12 NOTE — Clinical Note
Hi! I just saw Zeny Charles for breakthrough bleeding since starting Depo. Are you familiar with the medication to stop the bleeding? I encouraged the patient to reach out to GYN but I wanted to see if you had any recommendations as well.

## 2024-03-12 NOTE — PROGRESS NOTES
The patient location is:  Patient Home  The chief complaint leading to consultation is: as below  Visit type: Virtual visit with synchronous audio and video  Total time spent with patient: 20 minutes  Each patient to whom he or she provides medical services by telemedicine is:  (1) informed of the relationship between the physician and patient and the respective role of any other health care provider with respect to management of the patient; and (2) notified that she may decline to receive medical services by telemedicine and may withdraw from such care at any time.      HPI     Chief Complaint:  No chief complaint on file.      Zeny Charles is a 23 y.o. female with multiple medical diagnoses as listed in the medical history and problem list that presents for irregular bleeding.  Pt is not new to me and is known to this clinic with her last appointment being 2/26/2024.      HPI  Pt presents for irregular menstrual bleeding. Started on the depo shot about 1 month ago. Has had breakthrough bleeding for the past 3 weeks.       Assessment & Plan     Problem List Items Addressed This Visit    None  Visit Diagnoses       Breakthrough bleeding on Depo-Provera    -  Primary  Started on the depo shot about 1 month ago. Has had breakthrough bleeding for the past 3 weeks. Discussed that breakthrough bleeding is an expected side effect of Depo but it will resolve over time. Requesting medication to help stop the bleeding. Will discuss possible medication to stop breakthrough bleeding with PCP.    Irregular menstrual bleeding      Started on the depo shot about 1 month ago. Has had breakthrough bleeding for the past 3 weeks. Discussed that breakthrough bleeding is an expected side effect of Depo but it will resolve over time. Requesting medication to help stop the bleeding.             --------------------------------------------      Health Maintenance:  Health Maintenance         Date Due Completion Date    Pneumococcal  Vaccines (Age 0-64) (1 of 2 - PCV) 08/27/2008 10/24/2003    Pap Smear Never done ---    COVID-19 Vaccine (4 - 2023-24 season) 09/01/2023 3/17/2022    Chlamydia Screening 01/19/2025 1/19/2024    TETANUS VACCINE 08/28/2033 8/28/2023            Health maintenance reviewed    Follow Up:  No follow-ups on file.    Exam     Review of Systems:  (as noted above)  Review of Systems   Constitutional:  Negative for activity change and unexpected weight change.   HENT:  Negative for hearing loss, rhinorrhea and trouble swallowing.    Eyes:  Negative for discharge and visual disturbance.   Respiratory:  Negative for chest tightness and wheezing.    Cardiovascular:  Negative for chest pain and palpitations.   Gastrointestinal:  Negative for blood in stool, constipation, diarrhea and vomiting.   Endocrine: Negative for polydipsia and polyuria.   Genitourinary:  Positive for vaginal bleeding. Negative for difficulty urinating, dysuria, hematuria and menstrual problem.   Musculoskeletal:  Negative for arthralgias and joint swelling.   Neurological:  Negative for weakness and headaches.   Psychiatric/Behavioral:  Negative for confusion and dysphoric mood.        Physical Exam:   Physical Exam  There were no vitals filed for this visit.   There is no height or weight on file to calculate BMI.        History     Past Medical History:  Past Medical History:   Diagnosis Date    Factor 5 Leiden mutation, heterozygous 09/13/2016    Multiple sclerosis 04/2023    Seizures     1 at the age of 10       Past Surgical History:  Past Surgical History:   Procedure Laterality Date    TONSILLECTOMY  6 years old       Social History:  Social History     Socioeconomic History    Marital status: Single   Tobacco Use    Smoking status: Never    Smokeless tobacco: Never   Substance and Sexual Activity    Alcohol use: Not Currently     Comment: social drink once sa month per the patient    Drug use: No    Sexual activity: Yes     Partners: Male   Other  Topics Concern    Are you pregnant or think you may be? No    Breast-feeding No   Social History Narrative    Lives with Mom and Dad. No smokers. No alcohol, tobacco, illicit drugs. 1 dog.      Social Determinants of Health     Physical Activity: Unknown (1/17/2024)    Exercise Vital Sign     Days of Exercise per Week: 0 days       Family History:  Family History   Problem Relation Age of Onset    Hypertension Mother     Irregular menses Mother     Factor V Leiden deficiency Mother     Interstitial cystitis Mother     No Known Problems Father     Factor V Leiden deficiency Sister     Interstitial cystitis Maternal Aunt     Factor V Leiden deficiency Maternal Grandmother     Thyroid disease Maternal Grandmother         hypothyroidism    Mitral valve prolapse Maternal Grandfather     Congenital heart disease Maternal Grandfather         valve    Crohn's disease Paternal Grandmother     Breast cancer Neg Hx     Colon cancer Neg Hx     Ovarian cancer Neg Hx     Arrhythmia Neg Hx     Early death Neg Hx     Heart attacks under age 50 Neg Hx     Pacemaker/defibrilator Neg Hx     Esophageal cancer Neg Hx        Allergies and Medications: (updated and reviewed)  Review of patient's allergies indicates:   Allergen Reactions    Hazelnut Hives and Rash     Current Outpatient Medications   Medication Sig Dispense Refill    cholecalciferol, vitamin D3, 1,250 mcg (50,000 unit) capsule Take 1 capsule (50,000 Units total) by mouth every 7 days. 12 capsule 0    clindamycin (CLEOCIN T) 1 % lotion Apply to affected areas of neck BID prn acne. 60 mL 3    dextroamphetamine-amphetamine 10 mg Tab Take 1 tablet (10 mg total) by mouth 2 (two) times daily. 60 tablet 0    [START ON 4/4/2024] dextroamphetamine-amphetamine 10 mg Tab Take 1 tablet (10 mg total) by mouth 2 (two) times daily. 60 tablet 0    [START ON 5/4/2024] dextroamphetamine-amphetamine 10 mg Tab Take 1 tablet (10 mg total) by mouth 2 (two) times daily. 60 tablet 0    KESIMPTA  PEN 20 mg/0.4 mL PnIj every 30 days.      ketoconazole (NIZORAL) 2 % cream Apply to affected areas of face BID prn scaling. 60 g 5    medroxyPROGESTERone (DEPO-PROVERA) 150 mg/mL injection Inject 150 mg into the muscle.      ondansetron (ZOFRAN-ODT) 4 MG TbDL Take 1 tablet (4 mg total) by mouth every 8 (eight) hours as needed (nausea). 20 tablet 3    pantoprazole (PROTONIX) 20 MG tablet Take 1 tablet (20 mg total) by mouth once daily. 30 tablet 0    sucralfate (CARAFATE) 1 gram tablet Take 1 tablet (1 g total) by mouth 2 (two) times daily as needed. 60 tablet 0    tirzepatide, weight loss, (ZEPBOUND) 7.5 mg/0.5 mL PnIj Inject 7.5 mg into the skin every 7 days. 4 Pen 3     No current facility-administered medications for this visit.       Patient Care Team:  Chris Rendon MD as PCP - General (Family Medicine)  Zahra Cartwright MA (Inactive) as Care Coordinator  Mary Urena MD as Consulting Physician (Neurology)  Attending, Lab (Lab)       - The patient was sent an After Visit Summary virtually that lists all medications with directions, allergies, education, orders placed during this encounter and follow-up instructions.      - I have reviewed the patient's medical information including past medical, family, and social history sections including the medications and allergies.      - We discussed the patient's current medications.     This note was created by combination of typed  and MModal dictation.  Transcription errors may be present.  If there are any questions, please contact me.  Cathy Mabry NP

## 2024-03-13 ENCOUNTER — PATIENT MESSAGE (OUTPATIENT)
Dept: FAMILY MEDICINE | Facility: CLINIC | Age: 24
End: 2024-03-13
Payer: COMMERCIAL

## 2024-03-19 ENCOUNTER — PROCEDURE VISIT (OUTPATIENT)
Dept: DERMATOLOGY | Facility: CLINIC | Age: 24
End: 2024-03-19
Payer: COMMERCIAL

## 2024-03-19 DIAGNOSIS — Z41.1 ENCOUNTER FOR COSMETIC LASER PROCEDURE: Primary | ICD-10-CM

## 2024-03-19 DIAGNOSIS — L68.9 HYPERTRICHOSIS: ICD-10-CM

## 2024-03-19 PROBLEM — G04.91 MYELITIS, UNSPECIFIED: Status: ACTIVE | Noted: 2024-03-19

## 2024-03-19 PROCEDURE — 17999 UNLISTD PX SKN MUC MEMB SUBQ: CPT | Mod: CSM,S$GLB,, | Performed by: DERMATOLOGY

## 2024-03-19 PROCEDURE — 99499 UNLISTED E&M SERVICE: CPT | Mod: CSM,S$GLB,, | Performed by: DERMATOLOGY

## 2024-03-19 NOTE — PROGRESS NOTES
Zeny Charles is a 23 y.o. female who is here today for consult for laser treatment of hypertrichosis on face and neck.   Discussed risks, benefits, alternatives, and side effects of laser hair reduction.  The patient was given the opportunity to ask any questions, and all questions were answered to the patient's satisfaction.  The patient verbalized understanding, elected to proceed, and signed a written informed consent.

## 2024-03-30 ENCOUNTER — TELEPHONE (OUTPATIENT)
Dept: ENDOSCOPY | Facility: HOSPITAL | Age: 24
End: 2024-03-30
Payer: COMMERCIAL

## 2024-03-30 NOTE — TELEPHONE ENCOUNTER
Telephoned pt to review GLP-1 instructions for upcoming Endoscopy on 4/24/24 with no answer.   Left voicemail with direct contact number for pt to return call.  Portal message also sent.

## 2024-04-19 ENCOUNTER — TELEPHONE (OUTPATIENT)
Dept: ENDOSCOPY | Facility: HOSPITAL | Age: 24
End: 2024-04-19
Payer: COMMERCIAL

## 2024-05-10 ENCOUNTER — PROCEDURE VISIT (OUTPATIENT)
Dept: DERMATOLOGY | Facility: CLINIC | Age: 24
End: 2024-05-10
Payer: COMMERCIAL

## 2024-05-10 DIAGNOSIS — Z41.1 ENCOUNTER FOR COSMETIC LASER PROCEDURE: Primary | ICD-10-CM

## 2024-05-10 PROCEDURE — 17999 UNLISTD PX SKN MUC MEMB SUBQ: CPT | Mod: CSM,S$GLB,, | Performed by: DERMATOLOGY

## 2024-05-10 PROCEDURE — 99499 UNLISTED E&M SERVICE: CPT | Mod: CSM,S$GLB,, | Performed by: DERMATOLOGY

## 2024-05-20 RX ORDER — ONDANSETRON 4 MG/1
4 TABLET, ORALLY DISINTEGRATING ORAL EVERY 8 HOURS PRN
Qty: 20 TABLET | Refills: 3 | Status: SHIPPED | OUTPATIENT
Start: 2024-05-20

## 2024-06-12 ENCOUNTER — OFFICE VISIT (OUTPATIENT)
Dept: FAMILY MEDICINE | Facility: CLINIC | Age: 24
End: 2024-06-12
Payer: COMMERCIAL

## 2024-06-12 DIAGNOSIS — H10.9 CONJUNCTIVITIS, UNSPECIFIED CONJUNCTIVITIS TYPE, UNSPECIFIED LATERALITY: Primary | ICD-10-CM

## 2024-06-12 DIAGNOSIS — U07.1 COVID-19: ICD-10-CM

## 2024-06-12 PROCEDURE — 99213 OFFICE O/P EST LOW 20 MIN: CPT | Mod: 95,,, | Performed by: FAMILY MEDICINE

## 2024-06-12 RX ORDER — CIPROFLOXACIN HYDROCHLORIDE 3 MG/ML
1 SOLUTION/ DROPS OPHTHALMIC EVERY 4 HOURS
Qty: 10 ML | Refills: 0 | Status: SHIPPED | OUTPATIENT
Start: 2024-06-12

## 2024-06-12 NOTE — PROGRESS NOTES
The patient location is: [place]  The chief complaint leading to consultation is: COVID    Visit type: audiovisual    Face to Face time with patient:   4 minutes of total time spent on the encounter, which includes face to face time and non-face to face time preparing to see the patient (eg, review of tests), Obtaining and/or reviewing separately obtained history, Documenting clinical information in the electronic or other health record, Independently interpreting results (not separately reported) and communicating results to the patient/family/caregiver, or Care coordination (not separately reported).         Each patient to whom he or she provides medical services by telemedicine is:  (1) informed of the relationship between the physician and patient and the respective role of any other health care provider with respect to management of the patient; and (2) notified that he or she may decline to receive medical services by telemedicine and may withdraw from such care at any time.    Notes:     Subjective     Patient ID: Zeny Charles is a 23 y.o. female.    Chief Complaint: No chief complaint on file.    23 year old female presents for conjunctivitis in both eyes.  She reports she has redness and yellow green mucus in her eyes.  She was diagnosed with COVID, but states that her symptoms have improved, but has developed this issue.  She has no more fever or chills.       Review of Systems   Constitutional:  Negative for activity change and unexpected weight change.   HENT:  Positive for rhinorrhea. Negative for hearing loss and trouble swallowing.    Eyes:  Positive for discharge and visual disturbance.   Respiratory:  Negative for chest tightness and wheezing.    Cardiovascular:  Negative for chest pain and palpitations.   Gastrointestinal:  Negative for blood in stool, constipation, diarrhea and vomiting.   Endocrine: Negative for polydipsia and polyuria.   Genitourinary:  Negative for difficulty urinating,  dysuria, hematuria and menstrual problem.   Musculoskeletal:  Negative for arthralgias, joint swelling and neck pain.   Neurological:  Negative for weakness and headaches.   Psychiatric/Behavioral:  Negative for confusion and dysphoric mood.           Objective     Physical Exam       Assessment and Plan     1. Conjunctivitis, unspecified conjunctivitis type, unspecified laterality  -     ciprofloxacin HCl (CILOXAN) 0.3 % ophthalmic solution; Place 1 drop into both eyes every 4 (four) hours.  Dispense: 10 mL; Refill: 0    2. COVID-19  -     ciprofloxacin HCl (CILOXAN) 0.3 % ophthalmic solution; Place 1 drop into both eyes every 4 (four) hours.  Dispense: 10 mL; Refill: 0        Patient will put 1 drop in each eye every 4 hours while awake.  Make sure to rub her eyes if they itch with a clean towel.  Wash hands frequently.  Follow up with primary care physician if no improvement of symptoms         No follow-ups on file.

## 2024-06-12 NOTE — PROGRESS NOTES
Answers submitted by the patient for this visit:  Review of Systems Questionnaire (Submitted on 6/12/2024)  activity change: No  unexpected weight change: No  neck pain: No  hearing loss: No  rhinorrhea: Yes  trouble swallowing: No  eye discharge: Yes  visual disturbance: Yes  chest tightness: No  wheezing: No  chest pain: No  palpitations: No  blood in stool: No  constipation: No  vomiting: No  diarrhea: No  polydipsia: No  polyuria: No  difficulty urinating: No  hematuria: No  menstrual problem: No  dysuria: No  joint swelling: No  arthralgias: No  headaches: No  weakness: No  confusion: No  dysphoric mood: No

## 2024-06-13 ENCOUNTER — PATIENT MESSAGE (OUTPATIENT)
Dept: FAMILY MEDICINE | Facility: CLINIC | Age: 24
End: 2024-06-13
Payer: COMMERCIAL

## 2024-06-17 ENCOUNTER — PATIENT MESSAGE (OUTPATIENT)
Dept: FAMILY MEDICINE | Facility: CLINIC | Age: 24
End: 2024-06-17
Payer: COMMERCIAL

## 2024-06-17 DIAGNOSIS — Z00.00 ROUTINE MEDICAL EXAM: Primary | ICD-10-CM

## 2024-06-20 ENCOUNTER — OFFICE VISIT (OUTPATIENT)
Dept: CARDIOLOGY | Facility: CLINIC | Age: 24
End: 2024-06-20
Payer: COMMERCIAL

## 2024-06-20 VITALS
OXYGEN SATURATION: 99 % | HEIGHT: 61 IN | HEART RATE: 93 BPM | BODY MASS INDEX: 33.61 KG/M2 | SYSTOLIC BLOOD PRESSURE: 112 MMHG | WEIGHT: 178 LBS | DIASTOLIC BLOOD PRESSURE: 77 MMHG

## 2024-06-20 DIAGNOSIS — R07.9 CHEST PAIN, UNSPECIFIED TYPE: Primary | ICD-10-CM

## 2024-06-20 DIAGNOSIS — G37.9 CNS DEMYELINATION: Chronic | ICD-10-CM

## 2024-06-20 DIAGNOSIS — R07.2 PRECORDIAL PAIN: ICD-10-CM

## 2024-06-20 DIAGNOSIS — D68.51 FACTOR 5 LEIDEN MUTATION, HETEROZYGOUS: Chronic | ICD-10-CM

## 2024-06-20 DIAGNOSIS — R07.9 CHEST PAIN, UNSPECIFIED TYPE: ICD-10-CM

## 2024-06-20 DIAGNOSIS — E66.09 CLASS 2 OBESITY DUE TO EXCESS CALORIES WITHOUT SERIOUS COMORBIDITY WITH BODY MASS INDEX (BMI) OF 37.0 TO 37.9 IN ADULT: Primary | ICD-10-CM

## 2024-06-20 PROCEDURE — 99999 PR PBB SHADOW E&M-EST. PATIENT-LVL III: CPT | Mod: PBBFAC,,, | Performed by: INTERNAL MEDICINE

## 2024-06-20 NOTE — PROGRESS NOTES
Subjective:   @Patient ID:  Zeny Charles is a 23 y.o. female who presents for evaluation of chest pain      HPI:   2024:  Initial visit with me.  Chest pain evaluation.  Symptoms started 1 month ago.  It is intermittent, has been more frequent.  Upper part of the chest.  No known exacerbating factor however when it comes is worse by palpation of the chest and certain movements.  EKG today sinus rhythm without any acute ischemic change.  No significant family history of premature coronary artery.        No tobacco abuse        Prior cardiovascular  Hx  --------------------------------    - EKG 2023, sinus rhythm without any acute ischemic            Patient Active Problem List    Diagnosis Date Noted    Myelitis, unspecified 2024    Ulnar neuropathy of both upper extremities 2023    Carpal tunnel syndrome, bilateral 2023    Adjustment disorder with mixed anxiety and depressed mood 03/15/2023    Leucocytosis 02/10/2023    Vitamin D deficiency 2023    CNS demyelination 2023    Paresthesia 2023    Impaired sensation 2022    Severe obesity (BMI 35.0-39.9) with comorbidity 2022    ADHD (attention deficit hyperactivity disorder), inattentive type 2022    Class 2 obesity due to excess calories without serious comorbidity with body mass index (BMI) of 37.0 to 37.9 in adult 2022    Seizure disorder 10/25/2016    Factor 5 Leiden mutation, heterozygous 2016           Right Arm BP - Sittin/77  Left Arm BP - Sittin/80        LAST HbA1c  Lab Results   Component Value Date    HGBA1C 4.8 2022       Lipid panel  Lab Results   Component Value Date    CHOL 169 2022    CHOL 137 2017     Lab Results   Component Value Date    HDL 40 2022    HDL 38 (L) 2017     Lab Results   Component Value Date    LDLCALC 118.2 2022    LDLCALC 86.0 2017     Lab Results   Component Value Date    TRIG 54 2022     TRIG 65 04/18/2017     Lab Results   Component Value Date    CHOLHDL 23.7 09/22/2022    CHOLHDL 27.7 04/18/2017            Review of Systems   Constitutional: Negative for chills and fever.   HENT:  Negative for hearing loss and nosebleeds.    Eyes:  Negative for blurred vision.   Cardiovascular:         As in HPI   Respiratory:  Negative for hemoptysis and shortness of breath.    Hematologic/Lymphatic: Negative for bleeding problem.   Skin:  Negative for itching.   Musculoskeletal:  Negative for falls.   Gastrointestinal:  Negative for abdominal pain and hematochezia.   Genitourinary:  Negative for hematuria.   Neurological:  Negative for dizziness and loss of balance.   Psychiatric/Behavioral:  Negative for altered mental status and depression.        Objective:   Physical Exam  Constitutional:       Appearance: She is well-developed.   HENT:      Head: Normocephalic and atraumatic.   Eyes:      Conjunctiva/sclera: Conjunctivae normal.   Neck:      Vascular: No carotid bruit or JVD.   Cardiovascular:      Rate and Rhythm: Normal rate and regular rhythm.      Pulses:           Carotid pulses are 2+ on the right side and 2+ on the left side.       Radial pulses are 2+ on the right side and 2+ on the left side.      Heart sounds: Normal heart sounds. No murmur heard.     No friction rub. No gallop.   Pulmonary:      Effort: Pulmonary effort is normal. No respiratory distress.      Breath sounds: Normal breath sounds. No stridor. No wheezing.   Musculoskeletal:      Cervical back: Neck supple.   Skin:     General: Skin is warm and dry.   Neurological:      Mental Status: She is alert and oriented to person, place, and time.   Psychiatric:         Behavior: Behavior normal.         Assessment:     1. Class 2 obesity due to excess calories without serious comorbidity with body mass index (BMI) of 37.0 to 37.9 in adult    2. Factor 5 Leiden mutation, heterozygous    3. CNS demyelination    4. Precordial pain        Plan:    Chest pain appears to be noncardiac.  Could be musculoskeletal  We will check stress EKG for reassurance.  Reassurance was provided to the patient  Check D-dimer given her significant history of factor 5 Leiden  Reassurance provided    Follow up with us as needed    Pertinent cardiac images and EKG reviewed independently.    Continue with current medical plan and lifestyle changes.  Return sooner for concerns or questions. If symptoms persist go to the ED  I have reviewed all pertinent data including patient's medical history in detail and updated the computerized patient record.     Orders Placed This Encounter   Procedures    D dimer, quantitative     Standing Status:   Future     Standing Expiration Date:   9/18/2025    Exercise Stress - EKG     Standing Status:   Future     Standing Expiration Date:   6/20/2025     Order Specific Question:   Release to patient     Answer:   Immediate       Follow up as scheduled.     She expressed verbal understanding and agreed with the plan    Patient's Medications   New Prescriptions    No medications on file   Previous Medications    CHOLECALCIFEROL, VITAMIN D3, 1,250 MCG (50,000 UNIT) CAPSULE    Take 1 capsule (50,000 Units total) by mouth every 7 days.    CIPROFLOXACIN HCL (CILOXAN) 0.3 % OPHTHALMIC SOLUTION    Place 1 drop into both eyes every 4 (four) hours.    CLINDAMYCIN (CLEOCIN T) 1 % LOTION    Apply to affected areas of neck BID prn acne.    DEXTROAMPHETAMINE-AMPHETAMINE 10 MG TAB    Take 1 tablet (10 mg total) by mouth 2 (two) times daily.    DEXTROAMPHETAMINE-AMPHETAMINE 10 MG TAB    Take 1 tablet (10 mg total) by mouth 2 (two) times daily.    DEXTROAMPHETAMINE-AMPHETAMINE 10 MG TAB    Take 1 tablet (10 mg total) by mouth 2 (two) times daily.    KESIMPTA PEN 20 MG/0.4 ML PNIJ    every 30 days.    KETOCONAZOLE (NIZORAL) 2 % CREAM    Apply to affected areas of face BID prn scaling.    MEDROXYPROGESTERONE (DEPO-PROVERA) 150 MG/ML INJECTION    Inject 150 mg into the  muscle.    ONDANSETRON (ZOFRAN-ODT) 4 MG TBDL    Take 1 tablet (4 mg total) by mouth every 8 (eight) hours as needed (nausea).    PANTOPRAZOLE (PROTONIX) 20 MG TABLET    Take 1 tablet (20 mg total) by mouth once daily.    SUCRALFATE (CARAFATE) 1 GRAM TABLET    Take 1 tablet (1 g total) by mouth 2 (two) times daily as needed.    TIRZEPATIDE, WEIGHT LOSS, (ZEPBOUND) 7.5 MG/0.5 ML PNIJ    Inject 7.5 mg into the skin every 7 days.   Modified Medications    No medications on file   Discontinued Medications    No medications on file

## 2024-06-26 ENCOUNTER — LAB VISIT (OUTPATIENT)
Dept: LAB | Facility: HOSPITAL | Age: 24
End: 2024-06-26
Attending: INTERNAL MEDICINE
Payer: COMMERCIAL

## 2024-06-26 DIAGNOSIS — R07.2 PRECORDIAL PAIN: ICD-10-CM

## 2024-06-26 DIAGNOSIS — D68.51 FACTOR 5 LEIDEN MUTATION, HETEROZYGOUS: Chronic | ICD-10-CM

## 2024-06-26 LAB — D DIMER PPP IA.FEU-MCNC: <0.19 MG/L FEU

## 2024-06-26 PROCEDURE — 36415 COLL VENOUS BLD VENIPUNCTURE: CPT | Mod: PO | Performed by: INTERNAL MEDICINE

## 2024-06-26 PROCEDURE — 85379 FIBRIN DEGRADATION QUANT: CPT | Performed by: INTERNAL MEDICINE

## 2024-06-27 ENCOUNTER — TELEPHONE (OUTPATIENT)
Dept: CARDIOLOGY | Facility: CLINIC | Age: 24
End: 2024-06-27
Payer: COMMERCIAL

## 2024-06-27 ENCOUNTER — PATIENT MESSAGE (OUTPATIENT)
Dept: CARDIOLOGY | Facility: CLINIC | Age: 24
End: 2024-06-27
Payer: COMMERCIAL

## 2024-06-27 NOTE — TELEPHONE ENCOUNTER
I reached out to patient this morning and left her V/message Regarding D-dimer.    Please call us back when you get this message.        Nw                                     ----- Message from Edmund Peña MD sent at 6/26/2024  4:15 PM CDT -----   D-dimer is  negative

## 2024-07-11 ENCOUNTER — OFFICE VISIT (OUTPATIENT)
Dept: PSYCHIATRY | Facility: CLINIC | Age: 24
End: 2024-07-11
Payer: COMMERCIAL

## 2024-07-11 DIAGNOSIS — F90.0 ADHD (ATTENTION DEFICIT HYPERACTIVITY DISORDER), INATTENTIVE TYPE: Primary | ICD-10-CM

## 2024-07-11 DIAGNOSIS — F43.23 ADJUSTMENT DISORDER WITH MIXED ANXIETY AND DEPRESSED MOOD: ICD-10-CM

## 2024-07-11 RX ORDER — DEXTROAMPHETAMINE SACCHARATE, AMPHETAMINE ASPARTATE, DEXTROAMPHETAMINE SULFATE AND AMPHETAMINE SULFATE 2.5; 2.5; 2.5; 2.5 MG/1; MG/1; MG/1; MG/1
10 TABLET ORAL 2 TIMES DAILY
Qty: 60 TABLET | Refills: 0 | Status: SHIPPED | OUTPATIENT
Start: 2024-08-10

## 2024-07-11 RX ORDER — DEXTROAMPHETAMINE SACCHARATE, AMPHETAMINE ASPARTATE, DEXTROAMPHETAMINE SULFATE AND AMPHETAMINE SULFATE 2.5; 2.5; 2.5; 2.5 MG/1; MG/1; MG/1; MG/1
10 TABLET ORAL 2 TIMES DAILY
Qty: 60 TABLET | Refills: 0 | Status: SHIPPED | OUTPATIENT
Start: 2024-07-11

## 2024-07-11 RX ORDER — ESCITALOPRAM OXALATE 10 MG/1
10 TABLET ORAL DAILY
Qty: 90 TABLET | Refills: 3 | Status: SHIPPED | OUTPATIENT
Start: 2024-07-11 | End: 2025-07-11

## 2024-07-11 RX ORDER — DEXTROAMPHETAMINE SACCHARATE, AMPHETAMINE ASPARTATE, DEXTROAMPHETAMINE SULFATE AND AMPHETAMINE SULFATE 2.5; 2.5; 2.5; 2.5 MG/1; MG/1; MG/1; MG/1
10 TABLET ORAL 2 TIMES DAILY
Qty: 60 TABLET | Refills: 0 | Status: SHIPPED | OUTPATIENT
Start: 2024-09-09

## 2024-07-11 NOTE — PROGRESS NOTES
Outpatient Psychiatry Follow-Up Visit (MD/NP)    7/11/2024    Clinical Status of Patient:  Outpatient (Ambulatory)    Chief Complaint:  Zeny Charles is a 23 y.o. female who presents today for follow-up of attention problems.  Met with patient.      Last visit was: 5/25/22. Chart and  reviewed  The patient location is: home  The chief complaint leading to consultation is: attention problems    Visit type: audiovisual    Face to Face time with patient: 30 minutes  35 minutes of total time spent on the encounter, which includes face to face time and non-face to face time preparing to see the patient (eg, review of tests), Obtaining and/or reviewing separately obtained history, Documenting clinical information in the electronic or other health record, Independently interpreting results (not separately reported) and communicating results to the patient/family/caregiver, or Care coordination (not separately reported).     Each patient to whom he or she provides medical services by telemedicine is:  (1) informed of the relationship between the physician and patient and the respective role of any other health care provider with respect to management of the patient; and (2) notified that he or she may decline to receive medical services by telemedicine and may withdraw from such care at any time.    Interval History and Content of Current Session:  Current Psychiatric MedicatioCounseling this visit focused on building adaptive coping skills, medication teaching, and cognitive behavioral therapy (CBT)ns/changes  Continue Adderall 10 mg po BID      Virtual Visit:  Reports she wishes to retry Lexapro for anxiety and mood.  Pt reports she is doing well with Adderall for managing ADHD sx. Thought processes are linear, clear, and organized. Denies SI/HI/AVH.     Psychotherapy:  Target symptoms: distractability  Why chosen therapy is appropriate versus another modality: relevant to diagnosis  Outcome monitoring methods:  self-report  Therapeutic intervention type: insight oriented psychotherapy  Topics discussed/themes: building skills sets for symptom management, symptom recognition  The patient's response to the intervention is accepting. The patient's progress toward treatment goals is good.   Duration of intervention: 14 minutes.    Review of Systems   PSYCHIATRIC: Pertinant items are noted in the narrative.  CONSTITUTIONAL: No weight gain or loss.   MUSCULOSKELETAL: No pain or stiffness of the joints.  NEUROLOGIC: No weakness, sensory changes, seizures, confusion, memory loss, tremor or other abnormal movements.  ENDOCRINE: No polydipsia or polyuria.  INTEGUMENTARY: No rashes or lacerations.  EYES: No exophthalmos, jaundice or blindness.  ENT: No dizziness, tinnitus or hearing loss.  RESPIRATORY: No shortness of breath.  CARDIOVASCULAR: No tachycardia or chest pain.  GASTROINTESTINAL: No nausea, vomiting, pain, constipation or diarrhea.  GENITOURINARY: No frequency, dysuria or sexual dysfunction.  HEMATOLOGIC/LYMPHATIC: No excessive bleeding, prolonged or excessive bleeding after dental extraction/injury.  ALLERGIC/IMMUNOLOGIC: No allergic response to materials, foods or animals at this time.    Past Medical, Family and Social History: The patient's past medical, family and social history have been reviewed and updated as appropriate within the electronic medical record - see encounter notes.    Compliance: yes    Side effects: None    Risk Parameters:  Patient reports no suicidal ideation  Patient reports no homicidal ideation  Patient reports no self-injurious behavior  Patient reports no violent behavior    Exam (detailed: at least 9 elements; comprehensive: all 15 elements)   Constitutional  Vitals:  Most recent vital signs, dated greater than 90 days prior to this appointment, were reviewed.   There were no vitals filed for this visit.     General:  unremarkable, age appropriate     Musculoskeletal  Muscle Strength/Tone:   not examined   Gait & Station:  non-ataxic     Psychiatric  Speech:  no latency; no press   Mood & Affect:  steady  congruent and appropriate   Thought Process:  normal and logical   Associations:  intact   Thought Content:  normal, no suicidality, no homicidality, delusions, or paranoia   Insight:  intact   Judgement: behavior is adequate to circumstances   Orientation:  grossly intact   Memory: intact for content of interview   Language: grossly intact   Attention Span & Concentration:  able to focus   Fund of Knowledge:  intact and appropriate to age and level of education     Assessment and Diagnosis   Status/Progress: Based on the examination today, the patient's problem(s) is/are well controlled.  New problems have been presented today (anxiety).   Co-morbidities and Lack of compliance are not complicating management of the primary condition.  There are no active rule-out diagnoses for this patient at this time.     General Impression:       ICD-10-CM ICD-9-CM   1. ADHD (attention deficit hyperactivity disorder), inattentive type  F90.0 314.00   2. Adjustment disorder with mixed anxiety and depressed mood  F43.23 309.28       Intervention/Counseling/Treatment Plan   Medication Management: The risks and benefits of medication were discussed with the patient.  Continue Adderall 10 mg po BID  Lexapro 10 mg daily    Return to Clinic: 3 months    Risks, benefits, side effects and alternative treatments discussed with patient. Patient agrees with the current plan as documented.  Encouraged Patient to keep future appointments.  Take medications as prescribed and abstain from substance abuse.  Pt to present to ED for thoughts to harm herself or others

## 2024-08-01 ENCOUNTER — HOSPITAL ENCOUNTER (OUTPATIENT)
Dept: RADIOLOGY | Facility: HOSPITAL | Age: 24
Discharge: HOME | End: 2024-08-01
Attending: FAMILY MEDICINE
Payer: COMMERCIAL

## 2024-08-01 ENCOUNTER — OFFICE VISIT (OUTPATIENT)
Dept: FAMILY MEDICINE | Facility: CLINIC | Age: 24
End: 2024-08-01
Payer: COMMERCIAL

## 2024-08-01 VITALS
HEART RATE: 85 BPM | WEIGHT: 182.63 LBS | BODY MASS INDEX: 34.48 KG/M2 | SYSTOLIC BLOOD PRESSURE: 108 MMHG | OXYGEN SATURATION: 99 % | TEMPERATURE: 98 F | DIASTOLIC BLOOD PRESSURE: 74 MMHG | HEIGHT: 61 IN

## 2024-08-01 DIAGNOSIS — Z23 NEED FOR PNEUMOCOCCAL VACCINATION: ICD-10-CM

## 2024-08-01 DIAGNOSIS — G40.909 NONINTRACTABLE EPILEPSY WITHOUT STATUS EPILEPTICUS, UNSPECIFIED EPILEPSY TYPE: ICD-10-CM

## 2024-08-01 DIAGNOSIS — G37.9 CNS DEMYELINATION: ICD-10-CM

## 2024-08-01 DIAGNOSIS — M79.662 PAIN OF LEFT CALF: ICD-10-CM

## 2024-08-01 DIAGNOSIS — E55.9 VITAMIN D DEFICIENCY: ICD-10-CM

## 2024-08-01 DIAGNOSIS — G04.91 MYELITIS, UNSPECIFIED: ICD-10-CM

## 2024-08-01 DIAGNOSIS — Z00.00 ROUTINE PHYSICAL EXAMINATION: Primary | ICD-10-CM

## 2024-08-01 DIAGNOSIS — E66.01 SEVERE OBESITY (BMI 35.0-39.9) WITH COMORBIDITY: ICD-10-CM

## 2024-08-01 PROBLEM — D72.829 LEUCOCYTOSIS: Status: RESOLVED | Noted: 2023-02-10 | Resolved: 2024-08-01

## 2024-08-01 PROCEDURE — 93971 EXTREMITY STUDY: CPT | Mod: 26,LT,, | Performed by: RADIOLOGY

## 2024-08-01 PROCEDURE — 93971 EXTREMITY STUDY: CPT | Mod: TC,LT

## 2024-08-01 PROCEDURE — 99999 PR PBB SHADOW E&M-EST. PATIENT-LVL IV: CPT | Mod: PBBFAC,,, | Performed by: FAMILY MEDICINE

## 2024-08-01 RX ORDER — ASPIRIN 325 MG
50000 TABLET, DELAYED RELEASE (ENTERIC COATED) ORAL
Qty: 12 CAPSULE | Refills: 3 | Status: SHIPPED | OUTPATIENT
Start: 2024-08-01

## 2024-08-01 NOTE — PROGRESS NOTES
"Patient ID: eZny Charles is a 23 y.o. female who presents today for a routine clinic visit for MS.  She was last seen by Dr. Urena on 3/7/2023 then Dr. He on 7/10/2024.   The history was provided by the patient.     Principle neurological diagnosis: MS   Date of symptom onset: 2022, but possibly in high school.   Date of diagnosis: 2023  Disease type at diagnosis: RR  Disease type currently: RR  Previous therapy: IVMP  Current therapy: Kesimpta 2023 - present  Vitamin D Dose: 11748SW weekly   Last MRI Brain: 2/7/2023 - enhancing and nonenhancing lesions  Last MRI C-spine: 2/7/2023 - enhancing and nonenhacing lesions.   Last MRI T-spine: NA  CSF: 2023 - reported positive MS profile  JCV status: NA  Other relevant labs and tests: 7/23/2024: NMO and MOG    Subjective:     MS relapse history:   In 2022 she was in [facility] she started with numbness and tingling in her feet and it started coming up to her chest.  During the summer she started noticing that she was falling and the started with numbness and tingling in her hands. [place] back to [place] and saw a neurologist on the [facility] and was told that she had a "slipped disc".  She did do PT for it and did not see improvement.  She pushed to have further MRIs done and that's when demyelinating lesions were found. She was told to go to the ER for steroids. Her symptoms did not resolve completely after steroids.  Her balance got better, but numbness and tingling was still there. Then she started seeing Dr. He and did a LP - reported positive for OCBs.  She did start to see Dr. Urena, but only saw her once before she stopped seeing MS patients so she stayed with DR. He.     She was started on Kesimpta by Dr. He in 2023 and she has been on it since and compliant.  She tolerates the injections well. Denies infections. She is on the depo shot for birth control.     She has noticed a decrease in all of her symptoms and the only thing that she does " have is the tingling when she is too active.  She did recently have some chest pain and had a negative cardiac workup.  Dr. 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     She is currently feeling stable with no sense of relapse.     She does have some muscle spasms randomly, but very brief and not painful or bothersome.     She does remember that during her sophomore year in high school, her right pinky went numb for a few months then went back to normal on its own.     She does not have a family or personal history of any autoimmune or neurological disorders.     She denies weakness, sensory changes, dysphagia, dysarthria, visual changes, cognitive changes, mood disorder, gait disturbance, incoordination, pain, heat sensitivity, fatigue, bladder or bowel dysfunction.        SOCIAL HISTORY  Living arrangements - the patient lives with their family.  Social History     Socioeconomic History    Marital status: Single   Tobacco Use    Smoking status: Never    Smokeless tobacco: Never   Substance and Sexual Activity    Alcohol use: Not Currently     Comment: social drink once sa month per the patient    Drug use: No    Sexual activity: Yes     Partners: Male   Other Topics Concern    Are you pregnant or think you may be? No    Breast-feeding No   Social History Narrative    Lives with Mom and Dad. No smokers. No alcohol, tobacco, illicit drugs. 1 dog.      Social Determinants of Health     Financial Resource Strain: Low Risk  (1/31/2023)    Received from [facility], [facility]    Overall Financial Resource Strain (CARDIA)     Difficulty of Paying Living Expenses: Not very hard   Food Insecurity: No Food Insecurity (1/31/2023)    Received from [facility], [facility]    Hunger Vital Sign     Worried About Running Out of Food in the Last Year: Never true     Ran Out of Food in the Last Year: Never true   Transportation Needs: No Transportation Needs (1/31/2023)     Received from [facility], [facility]    PRAPARE - Transportation     Lack of Transportation (Medical): No     Lack of Transportation (Non-Medical): No   Physical Activity: Unknown (1/17/2024)    Exercise Vital Sign     Days of Exercise per Week: 0 days   Stress: No Stress Concern Present (1/31/2023)    Received from [facility], [facility]    [employer] [place] [employer] - Occupational Stress Questionnaire     Feeling of Stress : Only a little   Housing Stability: Low Risk  (1/31/2023)    Received from [facility], [facility]    Housing Stability Vital Sign     Unable to Pay for Housing in the Last Year: No     Number of Places Lived in the Last Year: 2     In the last 12 months, was there a time when you did not have a steady place to sleep or slept in a shelter (including now)?: No       Current Outpatient Medications on File Prior to Visit   Medication Sig Dispense Refill    cholecalciferol, vitamin D3, 1,250 mcg (50,000 unit) capsule Take 1 capsule (50,000 Units total) by mouth every 7 days. 12 capsule 3    [START ON 8/10/2024] dextroamphetamine-amphetamine 10 mg Tab Take 1 tablet (10 mg total) by mouth 2 (two) times daily. 60 tablet 0    [START ON 9/9/2024] dextroamphetamine-amphetamine 10 mg Tab Take 1 tablet (10 mg total) by mouth 2 (two) times daily. 60 tablet 0    dextroamphetamine-amphetamine 10 mg Tab Take 1 tablet (10 mg total) by mouth 2 (two) times daily. 60 tablet 0    EScitalopram oxalate (LEXAPRO) 10 MG tablet Take 1 tablet (10 mg total) by mouth once daily. 90 tablet 3    KESIMPTA PEN 20 mg/0.4 mL PnIj every 30 days.      medroxyPROGESTERone (DEPO-PROVERA) 150 mg/mL injection Inject 150 mg into the muscle.      ondansetron (ZOFRAN-ODT) 4 MG TbDL Take 1 tablet (4 mg total) by mouth every 8 (eight) hours as needed (nausea). 20 tablet 3    ketoconazole (NIZORAL) 2 % cream Apply to affected areas of face BID prn scaling. (Patient not taking: Reported on 8/2/2024) 60 g 5     No current  facility-administered medications on file prior to visit.       Objective:     1. 25 foot timed walk:      8/2/2024    12:01 AM   Timed 25 Foot Walk:   Did patient wear an AFO? No   Was assistive device used? No   Time for 25 Foot Walk (seconds) 5.03   Time for 25 Foot Walk (seconds) 4.85           NEURO EXAM    In general, the patient is well nourished and appears to be in no acute distress.    MENTAL STATUS: language is fluent, normal verbal comprehension, short-term and remote memory is intact, attention is normal, patient is alert and oriented x 3, fund of knowlege is appropriate by vocabulary.     CRANIAL NERVE EXAM:  There is no internuclear ophthalmoplegia.  Extraocular muscles are intact. Pupils are equal, round, and reactive to light. No facial asymmetry. Facial sensation is intact bilaterally. There is no dysarthria. Uvula is midline, and palate moves symmetrically. Shoulder shrug intact bilaterlly. Tongue protrusion is midline. Hearing is intact to finger rub bilaterally. Neck is supple with full ROM    MOTOR EXAM: Normal bulk and tone throughout UE and LE bilaterally. Mild left pronator drift; rapid sequential movements are normal; Strength is  5/5 in all groups in the lower extremities and upper extremities    REFLEXES: 2+ and symmetric throughout in all four extremities; toes are down on left and neutral on right    SENSORY EXAM: Normal to light touch and decreased vibration throughout.    COORDINATION: Normal finger-to-nose exam. Normal heel-to-shin exam.    GAIT: Narrow based and stable. Able to heel walk, toe walk, and perform tandem gait.     Negative Romberg's    Imaging:     Personally reviewed.     Results for orders placed during the hospital encounter of 02/07/23    MRI Brain Demyelinating W W/O Contrast    Impression  Active demyelinating disease as described progressed since the prior exam.      Electronically signed by: Arnav Valencia Jr  Date:    02/08/2023  Time:    10:10    Results  "for orders placed during the hospital encounter of 02/07/23    MRI Cervical Spine Demyelinating W W/O Contrast    Impression  Findings in keeping with patient's known demyelinating disease with faint areas of enhancement in the spinal cord that may reflect active demyelination.      Electronically signed by: Arnav Valencia Jr  Date:    02/08/2023  Time:    10:29          Labs:     Lab Results   Component Value Date    EQQUEHFJ34IQ 19 (L) 02/03/2023     No results found for: "JCVINDEX", "JCVANTIBODY"  No results found for: "MY1NPFUA", "ABSOLUTECD3", "HP4PKBHX", "ABSOLUTECD8", "PX3XNTZH", "ABSOLUTECD4", "LABCD48"  Lab Results   Component Value Date    WBC 17.46 (H) 01/18/2024    RBC 5.35 01/18/2024    HGB 16.8 (H) 01/18/2024    HCT 51.1 (H) 01/18/2024    MCV 96 01/18/2024    MCH 31.4 (H) 01/18/2024    MCHC 32.9 01/18/2024    RDW 12.3 01/18/2024     01/18/2024    MPV 12.7 01/18/2024    GRAN 15.7 (H) 01/18/2024    GRAN 90.2 (H) 01/18/2024    LYMPH 0.5 (L) 01/18/2024    LYMPH 2.9 (L) 01/18/2024    MONO 1.0 01/18/2024    MONO 5.9 01/18/2024    EOS 0.1 01/18/2024    BASO 0.04 01/18/2024    EOSINOPHIL 0.5 01/18/2024    BASOPHIL 0.2 01/18/2024     Sodium   Date Value Ref Range Status   01/18/2024 140 136 - 145 mmol/L Final     Potassium   Date Value Ref Range Status   01/18/2024 3.7 3.5 - 5.1 mmol/L Final     Chloride   Date Value Ref Range Status   01/18/2024 105 95 - 110 mmol/L Final     CO2   Date Value Ref Range Status   01/18/2024 24 23 - 29 mmol/L Final     Glucose   Date Value Ref Range Status   01/18/2024 95 70 - 110 mg/dL Final     BUN   Date Value Ref Range Status   01/18/2024 8 6 - 20 mg/dL Final     Creatinine   Date Value Ref Range Status   01/18/2024 0.7 0.5 - 1.4 mg/dL Final     Calcium   Date Value Ref Range Status   01/18/2024 9.7 8.7 - 10.5 mg/dL Final     Total Protein   Date Value Ref Range Status   01/18/2024 8.5 (H) 6.0 - 8.4 g/dL Final     Albumin   Date Value Ref Range Status   01/18/2024 " 4.4 3.5 - 5.2 g/dL Final     Total Bilirubin   Date Value Ref Range Status   01/18/2024 0.8 0.1 - 1.0 mg/dL Final     Comment:     For infants and newborns, interpretation of results should be based  on gestational age, weight and in agreement with clinical  observations.    Premature Infant recommended reference ranges:  Up to 24 hours.............<8.0 mg/dL  Up to 48 hours............<12.0 mg/dL  3-5 days..................<15.0 mg/dL  6-29 days.................<15.0 mg/dL       Alkaline Phosphatase   Date Value Ref Range Status   01/18/2024 67 55 - 135 U/L Final     AST   Date Value Ref Range Status   01/18/2024 20 10 - 40 U/L Final     ALT   Date Value Ref Range Status   01/18/2024 29 10 - 44 U/L Final     Anion Gap   Date Value Ref Range Status   01/18/2024 11 8 - 16 mmol/L Final     eGFR if    Date Value Ref Range Status   03/17/2021 >60 >60 mL/min/1.73 m^2 Final     eGFR    Date Value Ref Range Status   01/26/2023 >105 >=90 mL/min Final     Comment:     Calculation based on the Chronic Kidney Disease Epidemiology Collaboration (CKD-EPI) equation refit without adjustment for race.     eGFR if non    Date Value Ref Range Status   03/17/2021 >60 >60 mL/min/1.73 m^2 Final     Comment:     Calculation used to obtain the estimated glomerular filtration  rate (eGFR) is the CKD-EPI equation.        Lab Results   Component Value Date    HEPBSAG Non-reactive 04/14/2023    HEPBSAB 16.18 02/03/2023    HEPBSAB Reactive 02/03/2023           MS Impression and Plan:     NEURO MULTIPLE SCLEROSIS IMPRESSION:   Number of relapses in the past year?:  0  Clinical Progression:  Clinically Stable  MS Classification:  Relapsing-Remitting MS  DMT:  No change in management  DMT comment:  Continue Kesimpta.  She is aware of the risks associated with immunosuppressant therapy, including increased risk of infection.   Symptom Management:  No change in symptom management  Supplements:  Vit D  "(continue weekly D3 supplement)     Agree with diagnosis of RRMS. She does meet 2017 Fuller diagnostic criteria: she has had one attack with more than 2 lesions and fits criteria for DIT and DIS with enhancing and nonenhancing lesion within in the brain and cervical spine.  NMO and MOG antibodies were negative as well, further supporting diagnosis of MS.  It would be good to get results of LP, but without it given the above information, RRMS is the most likely diagnosis.   Pt was given "new patient" folder.    Safety monitoring labs today  MRIs of brain, cervical, and thoracic spine WO contrast soon - valium given for claustrophobia   Follow up with Dr. Funk in 6 months   Plan discussed and questions were answered to satisfaction.     Problem List Items Addressed This Visit    None  Visit Diagnoses       MS (multiple sclerosis)    -  Primary    Relevant Medications    diazePAM (VALIUM) 5 MG tablet    Other Relevant Orders    MRI Brain Demyelinating Without Contrast    MRI Cervical Spine Demyelinating Without Contrast    MRI Thoracic Spine Demyelinating Without Contrast    CBC Auto Differential    Comprehensive Metabolic Panel    Hepatitis B Core Antibody, Total    Hepatitis B Surface Ab, Qualitative    Hepatitis B Surface Antigen    Immunoglobulins (IgG, IgA, IgM) Quantitative    STRATIFY JCV ANTIBODY (with Index)    Neurofilament Light Chain    HCG, Quantitative    Claustrophobia        Relevant Medications    diazePAM (VALIUM) 5 MG tablet            I spent a total of 60 minutes on the day of the visit.This includes face to face time and non-face to face time preparing to see the patient (eg, review of tests), obtaining and/or reviewing separately obtained history, documenting clinical information in the electronic or other health record, independently interpreting results and communicating results to the patient/family/caregiver, or care coordinator.       Deepti Patel, DEBBIE    "

## 2024-08-01 NOTE — LETTER
July 25,2024    [facility]  [address] [facility]  [place] [address]  Phone: [phone]  Fax: [phone]       Patient: Zeny Charles   YOB: 2000  Date of Visit: 7/25/2024  To Whom It May Concern:    Susana Charles  was at [facility] on 7/25/2024. The patient may return to work/school on 08/02/2024 with no restrictions. If you have any questions or concerns, or if I can be of further assistance, please do not hesitate to contact me.    Sincerely,    Daryn Rendon MA

## 2024-08-01 NOTE — PROGRESS NOTES
[facility]  Progress Note    SUBJECTIVE:     Chief Complaint   Patient presents with    Leg Pain     Left Calf   X 5 days        HPI   Zeny Charles  is a 23 y.o. female here for physical exam. Also has L lower leg pain which will be addressed below. Patient has no other new complaints/problems at this time.      Review of patient's allergies indicates:   Allergen Reactions    Hazelnut Hives and Rash       Past Medical History:   Diagnosis Date    Factor 5 Leiden mutation, heterozygous 09/13/2016    Multiple sclerosis 04/2023    Seizures     1 at the age of 10     Past Surgical History:   Procedure Laterality Date    ESOPHAGOGASTRODUODENOSCOPY N/A 4/24/2024    Procedure: EGD (ESOPHAGOGASTRODUODENOSCOPY);  Surgeon: Lei Naranjo MD;  Location: [facility] ([address] [place] [address]);  Service: Endoscopy;  Laterality: N/A;  2/22 pt rescheduled, portal -ml  4/19-lvm for precall-MS    TONSILLECTOMY  6 years old     Family History   Problem Relation Name Age of Onset    Hypertension Mother      Irregular menses Mother      Factor V Leiden deficiency Mother      Interstitial cystitis Mother      No Known Problems Father      Factor V Leiden deficiency Sister      Interstitial cystitis Maternal Aunt      Factor V Leiden deficiency Maternal Grandmother      Thyroid disease Maternal Grandmother          hypothyroidism    Mitral valve prolapse Maternal Grandfather      Congenital heart disease Maternal Grandfather          valve    Crohn's disease Paternal Grandmother      Breast cancer Neg Hx      Colon cancer Neg Hx      Ovarian cancer Neg Hx      Arrhythmia Neg Hx      Early death Neg Hx      Heart attacks under age 50 Neg Hx      Pacemaker/defibrilator Neg Hx      Esophageal cancer Neg Hx       Social History     Tobacco Use    Smoking status: Never    Smokeless tobacco: Never   Substance Use Topics    Alcohol use: Not Currently     Comment: social drink once sa month per the patient    Drug use: No        Review of Systems   HENT:   Negative for hearing loss.    Eyes:  Negative for discharge.   Respiratory:  Negative for wheezing.    Cardiovascular:  Negative for chest pain and palpitations.   Gastrointestinal:  Negative for blood in stool, constipation, diarrhea and vomiting.   Genitourinary:  Negative for dysuria and hematuria.   Musculoskeletal:  Negative for neck pain.        + L calf pain   Neurological:  Negative for weakness and headaches.   Endo/Heme/Allergies:  Negative for polydipsia.     OBJECTIVE:     Vitals:    08/01/24 1359   BP: 108/74   Pulse: 85   Temp: 98.2 °F (36.8 °C)     Body mass index is 34.51 kg/m².    Physical Exam  Constitutional:       General: She is not in acute distress.     Appearance: She is not diaphoretic.   HENT:      Head: Normocephalic and atraumatic.      Right Ear: Tympanic membrane and ear canal normal. No hemotympanum. Tympanic membrane is not perforated, erythematous or bulging.      Left Ear: Tympanic membrane and ear canal normal. No hemotympanum. Tympanic membrane is not perforated, erythematous or bulging.   Eyes:      Conjunctiva/sclera: Conjunctivae normal.      Pupils: Pupils are equal, round, and reactive to light.   Neck:      Thyroid: No thyromegaly.   Cardiovascular:      Rate and Rhythm: Normal rate and regular rhythm.      Heart sounds: Normal heart sounds. No murmur heard.     No friction rub. No gallop.   Pulmonary:      Effort: Pulmonary effort is normal. No respiratory distress.      Breath sounds: Normal breath sounds. No wheezing or rales.   Abdominal:      General: Bowel sounds are normal. There is no distension.      Palpations: Abdomen is soft.      Tenderness: There is no abdominal tenderness. There is no guarding or rebound.   Musculoskeletal:         General: Tenderness (L calf tenderness but no swelling, erythema, and warmth) present. Normal range of motion.   Skin:     General: Skin is warm.      Findings: No erythema or rash.   Neurological:      Mental Status: She is alert  and oriented to person, place, and time.         Old records were reviewed. Labs and/or images were independently reviewed.    ASSESSMENT     1. Routine physical examination    2. Pain of left calf    3. Need for pneumococcal vaccination    4. Severe obesity (BMI 35.0-39.9) with comorbidity    5. Myelitis, unspecified    6. Nonintractable epilepsy without status epilepticus, unspecified epilepsy type    7. CNS demyelination    8. Vitamin D deficiency        PLAN:     Routine physical examination  -     CBC Auto Differential; Future  -     Comprehensive Metabolic Panel; Future  -     Hemoglobin A1C; Future  -     Lipid Panel; Future  -     TSH; Future  -     T4, Free; Future  -     We briefly discussed diet, exercise, and routine preventive exams. All questions and comments addressed.    Need for pneumococcal vaccination  -     pneumoc 20-leighton conj-dip cr(PF) (PREVNAR-20 (PF)) injection Syrg 0.5 mL      RTC PRN    Chris Rendon MD  08/01/2024 2:16 PM    Additional Evaluation & Management issues:    In addition to today's Annual Physical, patient has other medical issues that need to be addressed, as well as their associated prescription management that is separate from today's physical, as documented separately below.     HPI  Pt here for c/o L leg pain. Rates pain as mild. Recently got back from [facility]. The pain started prior to going back to [facility], about 6 days ago. No chest pain, SOB.     Review of Systems   HENT:  Negative for hearing loss.    Eyes:  Negative for discharge.   Respiratory:  Negative for wheezing.    Cardiovascular:  Negative for chest pain and palpitations.   Gastrointestinal:  Negative for blood in stool, constipation, diarrhea and vomiting.   Genitourinary:  Negative for dysuria and hematuria.   Musculoskeletal:  Negative for neck pain.        + L calf pain   Neurological:  Negative for weakness and headaches.   Endo/Heme/Allergies:  Negative for polydipsia.     Physical Exam  Constitutional:        General: She is not in acute distress.     Appearance: She is not diaphoretic.   HENT:      Head: Normocephalic and atraumatic.      Right Ear: Tympanic membrane and ear canal normal. No hemotympanum. Tympanic membrane is not perforated, erythematous or bulging.      Left Ear: Tympanic membrane and ear canal normal. No hemotympanum. Tympanic membrane is not perforated, erythematous or bulging.   Eyes:      Conjunctiva/sclera: Conjunctivae normal.      Pupils: Pupils are equal, round, and reactive to light.   Neck:      Thyroid: No thyromegaly.   Cardiovascular:      Rate and Rhythm: Normal rate and regular rhythm.      Heart sounds: Normal heart sounds. No murmur heard.     No friction rub. No gallop.   Pulmonary:      Effort: Pulmonary effort is normal. No respiratory distress.      Breath sounds: Normal breath sounds. No wheezing or rales.   Abdominal:      General: Bowel sounds are normal. There is no distension.      Palpations: Abdomen is soft.      Tenderness: There is no abdominal tenderness. There is no guarding or rebound.   Musculoskeletal:         General: Tenderness (L calf tenderness but no swelling, erythema, and warmth) present. Normal range of motion.   Skin:     General: Skin is warm.      Findings: No erythema or rash.   Neurological:      Mental Status: She is alert and oriented to person, place, and time.         Pain of left calf  -     US Lower Extremity Veins Left; Future; Expected date: 08/01/2024  -     check u/s to r/o DVT (risk factors are recent travel, and factor 5 mutation).    Severe obesity (BMI 35.0-39.9) with comorbidity   -     Counseled patient about healthy diet, exercise habits, and to increase physical activity.    Myelitis, unspecified   -     Stable. Continue current regimen.    Nonintractable epilepsy without status epilepticus, unspecified epilepsy type   -     Stable. Continue current regimen.    CNS demyelination  -     cholecalciferol, vitamin D3, 1,250 mcg (50,000  unit) capsule; Take 1 capsule (50,000 Units total) by mouth every 7 days.  Dispense: 12 capsule; Refill: 3  -     f/u with neurology.    Vitamin D deficiency  -     cholecalciferol, vitamin D3, 1,250 mcg (50,000 unit) capsule; Take 1 capsule (50,000 Units total) by mouth every 7 days.  Dispense: 12 capsule; Refill: 3      RTC PRN

## 2024-08-02 ENCOUNTER — OFFICE VISIT (OUTPATIENT)
Dept: NEUROLOGY | Facility: CLINIC | Age: 24
End: 2024-08-02
Payer: COMMERCIAL

## 2024-08-02 VITALS
SYSTOLIC BLOOD PRESSURE: 103 MMHG | BODY MASS INDEX: 33.84 KG/M2 | HEART RATE: 82 BPM | DIASTOLIC BLOOD PRESSURE: 72 MMHG | HEIGHT: 61 IN | WEIGHT: 179.25 LBS

## 2024-08-02 DIAGNOSIS — F40.240 CLAUSTROPHOBIA: ICD-10-CM

## 2024-08-02 DIAGNOSIS — G35 MS (MULTIPLE SCLEROSIS): Primary | ICD-10-CM

## 2024-08-02 PROCEDURE — 99999 PR PBB SHADOW E&M-EST. PATIENT-LVL IV: CPT | Mod: PBBFAC,,,

## 2024-08-02 RX ORDER — DIAZEPAM 5 MG/1
TABLET ORAL
Qty: 2 TABLET | Refills: 0 | Status: SHIPPED | OUTPATIENT
Start: 2024-08-02

## 2024-08-06 ENCOUNTER — TELEPHONE (OUTPATIENT)
Dept: NEUROLOGY | Facility: CLINIC | Age: 24
End: 2024-08-06
Payer: COMMERCIAL

## 2024-09-13 ENCOUNTER — PATIENT MESSAGE (OUTPATIENT)
Dept: PSYCHIATRY | Facility: CLINIC | Age: 24
End: 2024-09-13
Payer: COMMERCIAL

## 2024-09-18 ENCOUNTER — LAB VISIT (OUTPATIENT)
Dept: LAB | Facility: HOSPITAL | Age: 24
End: 2024-09-18
Payer: COMMERCIAL

## 2024-09-18 DIAGNOSIS — G35 MS (MULTIPLE SCLEROSIS): ICD-10-CM

## 2024-09-18 DIAGNOSIS — Z00.00 ROUTINE PHYSICAL EXAMINATION: ICD-10-CM

## 2024-09-18 LAB
ALBUMIN SERPL BCP-MCNC: 4.2 G/DL (ref 3.5–5.2)
ALBUMIN SERPL BCP-MCNC: 4.2 G/DL (ref 3.5–5.2)
ALP SERPL-CCNC: 68 U/L (ref 55–135)
ALP SERPL-CCNC: 68 U/L (ref 55–135)
ALT SERPL W/O P-5'-P-CCNC: 34 U/L (ref 10–44)
ALT SERPL W/O P-5'-P-CCNC: 34 U/L (ref 10–44)
ANION GAP SERPL CALC-SCNC: 11 MMOL/L (ref 8–16)
ANION GAP SERPL CALC-SCNC: 11 MMOL/L (ref 8–16)
AST SERPL-CCNC: 19 U/L (ref 10–40)
AST SERPL-CCNC: 19 U/L (ref 10–40)
BASOPHILS # BLD AUTO: 0.06 K/UL (ref 0–0.2)
BASOPHILS # BLD AUTO: 0.06 K/UL (ref 0–0.2)
BASOPHILS NFR BLD: 0.8 % (ref 0–1.9)
BASOPHILS NFR BLD: 0.8 % (ref 0–1.9)
BILIRUB SERPL-MCNC: 0.6 MG/DL (ref 0.1–1)
BILIRUB SERPL-MCNC: 0.6 MG/DL (ref 0.1–1)
BUN SERPL-MCNC: 7 MG/DL (ref 6–20)
BUN SERPL-MCNC: 7 MG/DL (ref 6–20)
CALCIUM SERPL-MCNC: 9.5 MG/DL (ref 8.7–10.5)
CALCIUM SERPL-MCNC: 9.5 MG/DL (ref 8.7–10.5)
CHLORIDE SERPL-SCNC: 101 MMOL/L (ref 95–110)
CHLORIDE SERPL-SCNC: 101 MMOL/L (ref 95–110)
CHOLEST SERPL-MCNC: 208 MG/DL (ref 120–199)
CHOLEST/HDLC SERPL: 4.5 {RATIO} (ref 2–5)
CO2 SERPL-SCNC: 25 MMOL/L (ref 23–29)
CO2 SERPL-SCNC: 25 MMOL/L (ref 23–29)
CREAT SERPL-MCNC: 0.7 MG/DL (ref 0.5–1.4)
CREAT SERPL-MCNC: 0.7 MG/DL (ref 0.5–1.4)
DIFFERENTIAL METHOD BLD: NORMAL
DIFFERENTIAL METHOD BLD: NORMAL
EOSINOPHIL # BLD AUTO: 0.1 K/UL (ref 0–0.5)
EOSINOPHIL # BLD AUTO: 0.1 K/UL (ref 0–0.5)
EOSINOPHIL NFR BLD: 0.6 % (ref 0–8)
EOSINOPHIL NFR BLD: 0.6 % (ref 0–8)
ERYTHROCYTE [DISTWIDTH] IN BLOOD BY AUTOMATED COUNT: 12.2 % (ref 11.5–14.5)
ERYTHROCYTE [DISTWIDTH] IN BLOOD BY AUTOMATED COUNT: 12.2 % (ref 11.5–14.5)
EST. GFR  (NO RACE VARIABLE): >60 ML/MIN/1.73 M^2
EST. GFR  (NO RACE VARIABLE): >60 ML/MIN/1.73 M^2
ESTIMATED AVG GLUCOSE: 82 MG/DL (ref 68–131)
GLUCOSE SERPL-MCNC: 83 MG/DL (ref 70–110)
GLUCOSE SERPL-MCNC: 83 MG/DL (ref 70–110)
HBA1C MFR BLD: 4.5 % (ref 4–5.6)
HBV CORE AB SERPL QL IA: NORMAL
HBV SURFACE AB SER-ACNC: 9.9 MIU/ML
HBV SURFACE AB SER-ACNC: NORMAL M[IU]/ML
HBV SURFACE AG SERPL QL IA: NORMAL
HCG INTACT+B SERPL-ACNC: <2.4 MIU/ML
HCT VFR BLD AUTO: 48.5 % (ref 37–48.5)
HCT VFR BLD AUTO: 48.5 % (ref 37–48.5)
HDLC SERPL-MCNC: 46 MG/DL (ref 40–75)
HDLC SERPL: 22.1 % (ref 20–50)
HGB BLD-MCNC: 15.7 G/DL (ref 12–16)
HGB BLD-MCNC: 15.7 G/DL (ref 12–16)
IGA SERPL-MCNC: 270 MG/DL (ref 40–350)
IGG SERPL-MCNC: 1137 MG/DL (ref 650–1600)
IGM SERPL-MCNC: 95 MG/DL (ref 50–300)
IMM GRANULOCYTES # BLD AUTO: 0.03 K/UL (ref 0–0.04)
IMM GRANULOCYTES # BLD AUTO: 0.03 K/UL (ref 0–0.04)
IMM GRANULOCYTES NFR BLD AUTO: 0.4 % (ref 0–0.5)
IMM GRANULOCYTES NFR BLD AUTO: 0.4 % (ref 0–0.5)
LDLC SERPL CALC-MCNC: 148.8 MG/DL (ref 63–159)
LYMPHOCYTES # BLD AUTO: 2 K/UL (ref 1–4.8)
LYMPHOCYTES # BLD AUTO: 2 K/UL (ref 1–4.8)
LYMPHOCYTES NFR BLD: 24.6 % (ref 18–48)
LYMPHOCYTES NFR BLD: 24.6 % (ref 18–48)
MCH RBC QN AUTO: 30.4 PG (ref 27–31)
MCH RBC QN AUTO: 30.4 PG (ref 27–31)
MCHC RBC AUTO-ENTMCNC: 32.4 G/DL (ref 32–36)
MCHC RBC AUTO-ENTMCNC: 32.4 G/DL (ref 32–36)
MCV RBC AUTO: 94 FL (ref 82–98)
MCV RBC AUTO: 94 FL (ref 82–98)
MONOCYTES # BLD AUTO: 0.6 K/UL (ref 0.3–1)
MONOCYTES # BLD AUTO: 0.6 K/UL (ref 0.3–1)
MONOCYTES NFR BLD: 7.4 % (ref 4–15)
MONOCYTES NFR BLD: 7.4 % (ref 4–15)
NEUTROPHILS # BLD AUTO: 5.3 K/UL (ref 1.8–7.7)
NEUTROPHILS # BLD AUTO: 5.3 K/UL (ref 1.8–7.7)
NEUTROPHILS NFR BLD: 66.2 % (ref 38–73)
NEUTROPHILS NFR BLD: 66.2 % (ref 38–73)
NONHDLC SERPL-MCNC: 162 MG/DL
NRBC BLD-RTO: 0 /100 WBC
NRBC BLD-RTO: 0 /100 WBC
PLATELET # BLD AUTO: 369 K/UL (ref 150–450)
PLATELET # BLD AUTO: 369 K/UL (ref 150–450)
PMV BLD AUTO: 12.4 FL (ref 9.2–12.9)
PMV BLD AUTO: 12.4 FL (ref 9.2–12.9)
POTASSIUM SERPL-SCNC: 3.7 MMOL/L (ref 3.5–5.1)
POTASSIUM SERPL-SCNC: 3.7 MMOL/L (ref 3.5–5.1)
PROT SERPL-MCNC: 8 G/DL (ref 6–8.4)
PROT SERPL-MCNC: 8 G/DL (ref 6–8.4)
RBC # BLD AUTO: 5.16 M/UL (ref 4–5.4)
RBC # BLD AUTO: 5.16 M/UL (ref 4–5.4)
SODIUM SERPL-SCNC: 137 MMOL/L (ref 136–145)
SODIUM SERPL-SCNC: 137 MMOL/L (ref 136–145)
T4 FREE SERPL-MCNC: 1.12 NG/DL (ref 0.71–1.51)
TRIGL SERPL-MCNC: 66 MG/DL (ref 30–150)
TSH SERPL DL<=0.005 MIU/L-ACNC: 1.54 UIU/ML (ref 0.4–4)
WBC # BLD AUTO: 7.98 K/UL (ref 3.9–12.7)
WBC # BLD AUTO: 7.98 K/UL (ref 3.9–12.7)

## 2024-09-18 PROCEDURE — 86711 JOHN CUNNINGHAM ANTIBODY: CPT

## 2024-09-18 PROCEDURE — 82784 ASSAY IGA/IGD/IGG/IGM EACH: CPT | Mod: 59

## 2024-09-18 PROCEDURE — 84443 ASSAY THYROID STIM HORMONE: CPT | Performed by: FAMILY MEDICINE

## 2024-09-18 PROCEDURE — 85025 COMPLETE CBC W/AUTO DIFF WBC: CPT | Performed by: FAMILY MEDICINE

## 2024-09-18 PROCEDURE — 83036 HEMOGLOBIN GLYCOSYLATED A1C: CPT | Performed by: FAMILY MEDICINE

## 2024-09-18 PROCEDURE — 0361U NEURFLMNT LT CHN DIG IA QUAN: CPT

## 2024-09-18 PROCEDURE — 87340 HEPATITIS B SURFACE AG IA: CPT

## 2024-09-18 PROCEDURE — 80053 COMPREHEN METABOLIC PANEL: CPT | Performed by: FAMILY MEDICINE

## 2024-09-18 PROCEDURE — 84439 ASSAY OF FREE THYROXINE: CPT | Performed by: FAMILY MEDICINE

## 2024-09-18 PROCEDURE — 36415 COLL VENOUS BLD VENIPUNCTURE: CPT

## 2024-09-18 PROCEDURE — 86704 HEP B CORE ANTIBODY TOTAL: CPT

## 2024-09-18 PROCEDURE — 84702 CHORIONIC GONADOTROPIN TEST: CPT

## 2024-09-18 PROCEDURE — 80061 LIPID PANEL: CPT | Performed by: FAMILY MEDICINE

## 2024-09-18 PROCEDURE — 86706 HEP B SURFACE ANTIBODY: CPT

## 2024-09-23 LAB — NEUROFILAMENT LIGHT CHAIN, PLASMA: 3.8 PG/ML

## 2024-09-24 LAB
JCPYV AB SERPL QL IA: POSITIVE
JCV INDEX: 1.33

## 2024-09-25 ENCOUNTER — TELEPHONE (OUTPATIENT)
Dept: NEUROLOGY | Facility: CLINIC | Age: 24
End: 2024-09-25
Payer: COMMERCIAL

## 2024-09-25 NOTE — TELEPHONE ENCOUNTER
"Contacted pt. Pt stated she would like to see Dr Marquez because she wants to see an MD and not an NP. This Rn explained to the pt she had an appt with an MD, Dr Funk but pt canceled. This Rn explained, we took over Dr Urena's pts and in order to have everyone seen in a timely manner, some pts were sched with the NP first. This Rn reiterated the pt's next appt., that pt canceled, was with Dr Funk.This Rn further explained that the pt would be that of Dr Funk's and Deepti's and pt would likely see Deepti every other appt and for urgent matters. Pt stated she recently had labs done and is concerned about results. She stated she sent messages with no reply. This Rn explained I did see Deepti reached out to pt via pt portal but pt did not respond. Pt stated she did not respond because she wanted to see an MD at [facility] before she decided if she will leave her current MD, Neri.  This Rn made pt aware our MA will reach out to her to reschedule appt with Dr Funk and i the meantime, I will send msg to Deepti and Dr Funk regarding labs. Pt stated stated she felt her lab results were being "held hostage" since she didn't answer if she will continue with our clinic. This Rn assured pt that is not the case and someone will reach out to her regarding her labs either via phone or pt portal. Pt voiced understanding.  "

## 2024-09-25 NOTE — TELEPHONE ENCOUNTER
----- Message from Amaris Johnnie sent at 9/25/2024  8:57 AM CDT -----  Regarding: appt  Contact: [phone] or [phone]  Patient is calling in because she no longer wants to see the NP Deepti Patel and prefers to see a MD (Dr. Marquez.) Please contact patient to make a appt for her. Patient can be contacted at [phone] or [phone]

## 2024-09-30 ENCOUNTER — TELEPHONE (OUTPATIENT)
Dept: NEUROLOGY | Facility: CLINIC | Age: 24
End: 2024-09-30
Payer: COMMERCIAL

## 2024-10-01 NOTE — ED TRIAGE NOTES
Pt c/o 10/10 sacral back pain that woke her out of sleep. Pt reports pain radiates to her abdomen. Pt reports taking ibuprofen with no relief. Pt also endorses slight numbness and tingling to vel hands and feet, but reports that is a chronic issue since being dx bulging disc.  Pt has prescription for gabapentin, but denied taking it. Denies N/V/D, dysuria, CP, SOB, blurred vision.    DISPLAY PLAN FREE TEXT

## 2024-10-03 ENCOUNTER — TELEPHONE (OUTPATIENT)
Dept: NEUROLOGY | Facility: CLINIC | Age: 24
End: 2024-10-03
Payer: COMMERCIAL

## 2024-10-07 ENCOUNTER — PATIENT MESSAGE (OUTPATIENT)
Dept: NEUROLOGY | Facility: CLINIC | Age: 24
End: 2024-10-07
Payer: COMMERCIAL

## 2024-10-10 ENCOUNTER — PATIENT MESSAGE (OUTPATIENT)
Dept: NEUROLOGY | Facility: CLINIC | Age: 24
End: 2024-10-10
Payer: COMMERCIAL

## 2024-10-10 DIAGNOSIS — G35 MS (MULTIPLE SCLEROSIS): Primary | ICD-10-CM

## 2024-10-10 DIAGNOSIS — M62.838 MUSCLE SPASM: ICD-10-CM

## 2024-10-11 RX ORDER — BACLOFEN 10 MG/1
10 TABLET ORAL 3 TIMES DAILY PRN
Qty: 60 TABLET | Refills: 0 | Status: SHIPPED | OUTPATIENT
Start: 2024-10-11

## 2024-11-13 ENCOUNTER — HOSPITAL ENCOUNTER (OUTPATIENT)
Dept: RADIOLOGY | Facility: HOSPITAL | Age: 24
Discharge: HOME | End: 2024-11-13
Payer: COMMERCIAL

## 2024-11-13 DIAGNOSIS — G35 MS (MULTIPLE SCLEROSIS): ICD-10-CM

## 2024-11-13 PROCEDURE — 72146 MRI CHEST SPINE W/O DYE: CPT | Mod: TC

## 2024-11-13 PROCEDURE — 72141 MRI NECK SPINE W/O DYE: CPT | Mod: TC

## 2024-11-13 PROCEDURE — 72141 MRI NECK SPINE W/O DYE: CPT | Mod: 26,,, | Performed by: RADIOLOGY

## 2024-11-13 PROCEDURE — 70551 MRI BRAIN STEM W/O DYE: CPT | Mod: 26,,, | Performed by: RADIOLOGY

## 2024-11-13 PROCEDURE — 72146 MRI CHEST SPINE W/O DYE: CPT | Mod: 26,,, | Performed by: RADIOLOGY

## 2024-11-13 PROCEDURE — 70551 MRI BRAIN STEM W/O DYE: CPT | Mod: TC

## 2024-12-02 ENCOUNTER — PATIENT MESSAGE (OUTPATIENT)
Dept: OBSTETRICS AND GYNECOLOGY | Facility: CLINIC | Age: 24
End: 2024-12-02
Payer: COMMERCIAL

## 2024-12-04 ENCOUNTER — OFFICE VISIT (OUTPATIENT)
Dept: NEUROLOGY | Facility: CLINIC | Age: 24
End: 2024-12-04
Payer: COMMERCIAL

## 2024-12-04 VITALS — DIASTOLIC BLOOD PRESSURE: 74 MMHG | SYSTOLIC BLOOD PRESSURE: 122 MMHG | HEART RATE: 87 BPM

## 2024-12-04 DIAGNOSIS — G35 MULTIPLE SCLEROSIS: Primary | ICD-10-CM

## 2024-12-04 DIAGNOSIS — G37.9 CNS DEMYELINATION: ICD-10-CM

## 2024-12-04 DIAGNOSIS — E55.9 VITAMIN D DEFICIENCY: ICD-10-CM

## 2024-12-04 PROCEDURE — 3078F DIAST BP <80 MM HG: CPT | Mod: CPTII,S$GLB,, | Performed by: STUDENT IN AN ORGANIZED HEALTH CARE EDUCATION/TRAINING PROGRAM

## 2024-12-04 PROCEDURE — G2211 COMPLEX E/M VISIT ADD ON: HCPCS | Mod: S$GLB,,, | Performed by: STUDENT IN AN ORGANIZED HEALTH CARE EDUCATION/TRAINING PROGRAM

## 2024-12-04 PROCEDURE — 99999 PR PBB SHADOW E&M-EST. PATIENT-LVL III: CPT | Mod: PBBFAC,,, | Performed by: STUDENT IN AN ORGANIZED HEALTH CARE EDUCATION/TRAINING PROGRAM

## 2024-12-04 PROCEDURE — 3044F HG A1C LEVEL LT 7.0%: CPT | Mod: CPTII,S$GLB,, | Performed by: STUDENT IN AN ORGANIZED HEALTH CARE EDUCATION/TRAINING PROGRAM

## 2024-12-04 PROCEDURE — 1159F MED LIST DOCD IN RCRD: CPT | Mod: CPTII,S$GLB,, | Performed by: STUDENT IN AN ORGANIZED HEALTH CARE EDUCATION/TRAINING PROGRAM

## 2024-12-04 PROCEDURE — 99214 OFFICE O/P EST MOD 30 MIN: CPT | Mod: S$GLB,,, | Performed by: STUDENT IN AN ORGANIZED HEALTH CARE EDUCATION/TRAINING PROGRAM

## 2024-12-04 PROCEDURE — 3074F SYST BP LT 130 MM HG: CPT | Mod: CPTII,S$GLB,, | Performed by: STUDENT IN AN ORGANIZED HEALTH CARE EDUCATION/TRAINING PROGRAM

## 2024-12-04 RX ORDER — ASPIRIN 325 MG
50000 TABLET, DELAYED RELEASE (ENTERIC COATED) ORAL
Qty: 12 CAPSULE | Refills: 3 | Status: SHIPPED | OUTPATIENT
Start: 2024-12-04

## 2024-12-04 NOTE — PROGRESS NOTES
[facility]  Follow Up Patient Visit      Disease Summary     Principle neurological diagnosis: MS   Date of symptom onset: 2022, but possibly in high school.   Date of diagnosis: 2023  Disease type at diagnosis: RR  Disease type currently: RR  Previous therapy: IVMP  Current therapy: Kesimpta 2023 - present  Vitamin D Dose: 84203SF weekly   Last MRI Brain: 2/7/2023 - enhancing and nonenhancing lesions  Last MRI C-spine: 2/7/2023 - enhancing and nonenhacing lesions.   Last MRI T-spine: NA  CSF: 2023 - reported positive MS profile  Other relevant labs and tests: 7/23/2024: NMO and MOG  JCV:   Lab Results   Component Value Date    JCVINDEX 1.33 (H) 09/18/2024    JCVANTIBODY POSITIVE (A) 09/18/2024     Vit D:   Lab Results   Component Value Date    COFLJGID32CL 19 (L) 02/03/2023       Interval history:     She just stopped birth control, and now waiting for her cycles to regulate.     Kesimpta injection going well, denies infections. HCG negative.     No new relapse since last visit.   She has occasional twitching of her arm and face, had 1 episode of chest tightness during a time of stress, now has resolved     Denies fatigue, denies urinary incontinence, denies cognitive changes, denies vision changes.    She takes 10mg once a day adderall as needed.  She is also on Lexapro, both prescribed by psychiatry.    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       ROS:     SOCIAL HISTORY  Living arrangements - the patient lives with their family.  Social History     Socioeconomic History    Marital status: Single   Tobacco Use    Smoking status: Never    Smokeless tobacco: Never   Substance and Sexual Activity    Alcohol use: Not Currently     Comment: social drink once sa month per the patient    Drug use: No    Sexual activity: Yes     Partners: Male   Other Topics Concern    Are you pregnant or think you may be? No    Breast-feeding  No   Social History Narrative    Lives with Mom and Dad. No smokers. No alcohol, tobacco, illicit drugs. 1 dog.      Social Drivers of Health     Financial Resource Strain: Low Risk  (1/31/2023)    Received from [facility]    Overall Financial Resource Strain (CARDIA)     Difficulty of Paying Living Expenses: Not very hard   Food Insecurity: No Food Insecurity (1/31/2023)    Received from [facility]    Hunger Vital Sign     Worried About Running Out of Food in the Last Year: Never true     Ran Out of Food in the Last Year: Never true   Transportation Needs: No Transportation Needs (1/31/2023)    Received from [facility]    PRAPARE - Transportation     Lack of Transportation (Medical): No     Lack of Transportation (Non-Medical): No   Physical Activity: Unknown (1/17/2024)    Exercise Vital Sign     Days of Exercise per Week: 0 days   Stress: No Stress Concern Present (1/31/2023)    Received from [facility]    [employer] [place] [employer] - Occupational Stress Questionnaire     Feeling of Stress : Only a little   Housing Stability: Low Risk  (1/31/2023)    Received from [facility]    Housing Stability Vital Sign     Unable to Pay for Housing in the Last Year: No     Number of Places Lived in the Last Year: 2     Unstable Housing in the Last Year: No       Patient Employment       Status   Student - Full Time               Exam:     Vitals:    12/04/24 0854   BP: 122/74   Patient Position: Sitting   Pulse: 87          In general, the patient is well nourished and appears to be in no acute distress.    MENTAL STATUS: language is fluent, normal verbal comprehension, short-term and remote memory is intact, attention is normal, patient is alert and oriented x 3, fund of knowlege is appropriate by vocabulary. Behavior and judgment appropriate.     CRANIAL NERVE EXAM:  There is no intrernuclear ophthalmoplegia.  Extraocular muscles  are intact. Pupils are equal, round, and reactive to light. No facial asymmetry. Facial sensation is intact bilaterally. There is no dysarthria. Uvula is midline, and palate moves symmetrically. Shoulder shrug intact bilaterlly. Tongue protrusion is midline. Hearing is intact to finger rub bilaterally. Neck is supple with full ROM    MOTOR EXAM: Normal bulk and tone throughout UE and LE bilaterally.   No pronator drift; rapid sequential movements are normal; Strength is  5/5 in all groups in the lower extremities and upper extremities    SENSORY EXAM: Normal to light touch, pinprick throughout.    COORDINATION: Normal finger-to-nose exam. Normal heel-to-shin exam.    GAIT: Narrow based and stable. Able to tandem gait.         8/2/2024     2:00 PM 12/4/2024     8:40 AM   Timed 25 Foot Walk:   Did patient wear an AFO? No No   Was assistive device used? No No   Time for 25 Foot Walk (seconds) 5.03 4.43   Time for 25 Foot Walk (seconds) 4.85 4.76           Imaging (personally reviewed):     Results for orders placed during the hospital encounter of 11/13/24    MRI Brain Demyelinating Without Contrast    Impression  Scattered lesions in the brain and cervical spinal cord in keeping with areas of prior demyelination in patient reported history of multiple sclerosis.  Lesions are similar in distribution, although several have decreased in size compared to prior exam.   No new discrete lesions to indicate ongoing demyelination.    Limited evaluation of the thoracic spinal cord due to motion, but no definite demyelinating lesions in this region.    Electronically signed by resident: Dean Mariscal  Date:    11/13/2024  Time:    13:21    Electronically signed by: Boris Rivera MD  Date:    11/13/2024  Time:    14:35    Results for orders placed during the hospital encounter of 11/13/24    MRI Cervical Spine Demyelinating Without Contrast    Impression  Scattered lesions in the brain and cervical spinal cord in keeping with  areas of prior demyelination in patient reported history of multiple sclerosis.  Lesions are similar in distribution, although several have decreased in size compared to prior exam.   No new discrete lesions to indicate ongoing demyelination.    Limited evaluation of the thoracic spinal cord due to motion, but no definite demyelinating lesions in this region.    Electronically signed by resident: Dean Mariscal  Date:    11/13/2024  Time:    13:21    Electronically signed by: Boris Rivera MD  Date:    11/13/2024  Time:    14:35    Results for orders placed during the hospital encounter of 11/13/24    MRI Thoracic Spine Demyelinating Without Contrast    Impression  Scattered lesions in the brain and cervical spinal cord in keeping with areas of prior demyelination in patient reported history of multiple sclerosis.  Lesions are similar in distribution, although several have decreased in size compared to prior exam.   No new discrete lesions to indicate ongoing demyelination.    Limited evaluation of the thoracic spinal cord due to motion, but no definite demyelinating lesions in this region.    Electronically signed by resident: Dean Mariscal  Date:    11/13/2024  Time:    13:21    Electronically signed by: Boris Rivera MD  Date:    11/13/2024  Time:    14:35    Results for orders placed during the hospital encounter of 02/07/23    MRI Brain Demyelinating W W/O Contrast    Impression  Active demyelinating disease as described progressed since the prior exam.      Electronically signed by: Arnav Valencia Jr  Date:    02/08/2023  Time:    10:10    Results for orders placed during the hospital encounter of 02/07/23    MRI Cervical Spine Demyelinating W W/O Contrast    Impression  Findings in keeping with patient's known demyelinating disease with faint areas of enhancement in the spinal cord that may reflect active demyelination.      Electronically signed by: Arnav Valencia  "Jr  Date:    02/08/2023  Time:    10:29    No results found for this or any previous visit.      Labs:     Lab Results   Component Value Date    CZZMIYOZ37SN 19 (L) 02/03/2023     Lab Results   Component Value Date    JCVINDEX 1.33 (H) 09/18/2024    JCVANTIBODY POSITIVE (A) 09/18/2024     No results found for: "GN8GNQKP", "ABSOLUTECD3", "WG4WNVML", "ABSOLUTECD8", "NS4CBSNN", "ABSOLUTECD4", "LABCD48"  Lab Results   Component Value Date    WBC 7.98 09/18/2024    WBC 7.98 09/18/2024    RBC 5.16 09/18/2024    RBC 5.16 09/18/2024    HGB 15.7 09/18/2024    HGB 15.7 09/18/2024    HCT 48.5 09/18/2024    HCT 48.5 09/18/2024    MCV 94 09/18/2024    MCV 94 09/18/2024    MCH 30.4 09/18/2024    MCH 30.4 09/18/2024    MCHC 32.4 09/18/2024    MCHC 32.4 09/18/2024    RDW 12.2 09/18/2024    RDW 12.2 09/18/2024     09/18/2024     09/18/2024    MPV 12.4 09/18/2024    MPV 12.4 09/18/2024    GRAN 5.3 09/18/2024    GRAN 66.2 09/18/2024    GRAN 5.3 09/18/2024    GRAN 66.2 09/18/2024    LYMPH 2.0 09/18/2024    LYMPH 24.6 09/18/2024    LYMPH 2.0 09/18/2024    LYMPH 24.6 09/18/2024    MONO 0.6 09/18/2024    MONO 7.4 09/18/2024    MONO 0.6 09/18/2024    MONO 7.4 09/18/2024    EOS 0.1 09/18/2024    EOS 0.1 09/18/2024    BASO 0.06 09/18/2024    BASO 0.06 09/18/2024    EOSINOPHIL 0.6 09/18/2024    EOSINOPHIL 0.6 09/18/2024    BASOPHIL 0.8 09/18/2024    BASOPHIL 0.8 09/18/2024     Sodium   Date Value Ref Range Status   09/18/2024 137 136 - 145 mmol/L Final   09/18/2024 137 136 - 145 mmol/L Final     Potassium   Date Value Ref Range Status   09/18/2024 3.7 3.5 - 5.1 mmol/L Final   09/18/2024 3.7 3.5 - 5.1 mmol/L Final     Chloride   Date Value Ref Range Status   09/18/2024 101 95 - 110 mmol/L Final   09/18/2024 101 95 - 110 mmol/L Final     CO2   Date Value Ref Range Status   09/18/2024 25 23 - 29 mmol/L Final   09/18/2024 25 23 - 29 mmol/L Final     Glucose   Date Value Ref Range Status   09/18/2024 83 70 - 110 mg/dL Final "   09/18/2024 83 70 - 110 mg/dL Final     BUN   Date Value Ref Range Status   09/18/2024 7 6 - 20 mg/dL Final   09/18/2024 7 6 - 20 mg/dL Final     Creatinine   Date Value Ref Range Status   09/18/2024 0.7 0.5 - 1.4 mg/dL Final   09/18/2024 0.7 0.5 - 1.4 mg/dL Final     Calcium   Date Value Ref Range Status   09/18/2024 9.5 8.7 - 10.5 mg/dL Final   09/18/2024 9.5 8.7 - 10.5 mg/dL Final     Total Protein   Date Value Ref Range Status   09/18/2024 8.0 6.0 - 8.4 g/dL Final   09/18/2024 8.0 6.0 - 8.4 g/dL Final     Albumin   Date Value Ref Range Status   09/18/2024 4.2 3.5 - 5.2 g/dL Final   09/18/2024 4.2 3.5 - 5.2 g/dL Final     Total Bilirubin   Date Value Ref Range Status   09/18/2024 0.6 0.1 - 1.0 mg/dL Final     Comment:     For infants and newborns, interpretation of results should be based  on gestational age, weight and in agreement with clinical  observations.    Premature Infant recommended reference ranges:  Up to 24 hours.............<8.0 mg/dL  Up to 48 hours............<12.0 mg/dL  3-5 days..................<15.0 mg/dL  6-29 days.................<15.0 mg/dL     09/18/2024 0.6 0.1 - 1.0 mg/dL Final     Comment:     For infants and newborns, interpretation of results should be based  on gestational age, weight and in agreement with clinical  observations.    Premature Infant recommended reference ranges:  Up to 24 hours.............<8.0 mg/dL  Up to 48 hours............<12.0 mg/dL  3-5 days..................<15.0 mg/dL  6-29 days.................<15.0 mg/dL       Alkaline Phosphatase   Date Value Ref Range Status   09/18/2024 68 55 - 135 U/L Final   09/18/2024 68 55 - 135 U/L Final     AST   Date Value Ref Range Status   09/18/2024 19 10 - 40 U/L Final   09/18/2024 19 10 - 40 U/L Final     ALT   Date Value Ref Range Status   09/18/2024 34 10 - 44 U/L Final   09/18/2024 34 10 - 44 U/L Final     Anion Gap   Date Value Ref Range Status   09/18/2024 11 8 - 16 mmol/L Final   09/18/2024 11 8 - 16 mmol/L Final      eGFR if    Date Value Ref Range Status   03/17/2021 >60 >60 mL/min/1.73 m^2 Final     eGFR    Date Value Ref Range Status   01/26/2023 >105 >=90 mL/min Final     Comment:     Calculation based on the Chronic Kidney Disease Epidemiology Collaboration (CKD-EPI) equation refit without adjustment for race.     eGFR if non    Date Value Ref Range Status   03/17/2021 >60 >60 mL/min/1.73 m^2 Final     Comment:     Calculation used to obtain the estimated glomerular filtration  rate (eGFR) is the CKD-EPI equation.          Diagnosis/Assessment/Plan:     Multiple Sclerosis  -Assessment: RRMS with brain and spinal cord lesions, stable on Kesimpta, no focal deficits on exam today  -Imaging:repeat in Sept 2025  -Disease Modifying Therapies: Continue Kesimpta, safety labs reviewed and new set ordered  Symptom management   -Cont weekly vit D 5000 IU   Plan discussed and questions were answered to satisfaction.  Return to clinic in 6 months.        NEURO MULTIPLE SCLEROSIS IMPRESSION:   Number of relapses in the past year?:  0  Clinical Progression:  Clinically Stable  MRI Progression:  Stable  MS Classification:  Relapsing-Remitting MS  Current DMT: ofatumumab  DMT:  No change in management  Symptom Management:  No change in symptom management        Total time spent with the patient: 30 minutes, including face to face consultation, chart review and coordination of care, on the day of the visit. This includes face to face time and non-face to face time preparing to see the patient (eg, review of tests), obtaining and/or reviewing separately obtained history, documenting clinical information in the electronic or other health record, independently interpreting results and communicating results to the patient/family/caregiver, or care coordination.   I performed a neurobehavioral status examination that included a clinical assessment of thinking, reasoning, and judgment. Please see  above HPI and ROS for full details.       Laurel Funk MD, MSc  Attending neurologist

## 2024-12-05 RX ORDER — VALACYCLOVIR HYDROCHLORIDE 1 G/1
TABLET, FILM COATED ORAL
COMMUNITY
Start: 2024-12-05

## 2024-12-05 NOTE — TELEPHONE ENCOUNTER
Refill Routing Note   Medication(s) are not appropriate for processing by [facility] for the following reason(s):        Patient not seen by provider within 15 months  No active prescription written by provider    [facility] action(s):  Defer             Appointments  past 12m or future 3m with PCP    Date Provider   Last Visit   4/13/2023 Chani Warren MD   Next Visit   Visit date not found Chani Warren MD   ED visits in past 90 days: 0        Note composed:5:03 PM 12/05/2024

## 2024-12-09 RX ORDER — VALACYCLOVIR HYDROCHLORIDE 1 G/1
1000 TABLET, FILM COATED ORAL 2 TIMES DAILY
Qty: 180 TABLET | Refills: 0 | OUTPATIENT
Start: 2024-12-09

## 2024-12-09 NOTE — TELEPHONE ENCOUNTER
Provider Staff:  Please note Refusal of medication.     Action required for this patient.      Requested Prescriptions     Refused Prescriptions Disp Refills    valACYclovir (VALTREX) 1000 MG tablet 180 tablet 0     Sig: Take 1 tablet (1,000 mg total) by mouth 2 (two) times a day.     Refused By: ALAN RICARDO     Reason for Refusal: Other (See comments)      Thanks!  [facility]   Note composed: 12/09/2024 4:58 PM

## 2024-12-13 ENCOUNTER — PROCEDURE VISIT (OUTPATIENT)
Dept: DERMATOLOGY | Facility: CLINIC | Age: 24
End: 2024-12-13
Payer: COMMERCIAL

## 2024-12-13 DIAGNOSIS — Z41.1 ENCOUNTER FOR COSMETIC LASER PROCEDURE: Primary | ICD-10-CM

## 2024-12-13 DIAGNOSIS — L68.9 HYPERTRICHOSIS: ICD-10-CM

## 2024-12-13 NOTE — PROGRESS NOTES
Patient Information  Name: Zeny Charles  : 2000  MRN: [id number]     Date of Visit: 24

## 2025-01-14 ENCOUNTER — PATIENT MESSAGE (OUTPATIENT)
Dept: FAMILY MEDICINE | Facility: CLINIC | Age: 25
End: 2025-01-14
Payer: COMMERCIAL

## 2025-01-14 ENCOUNTER — PATIENT MESSAGE (OUTPATIENT)
Dept: NEUROLOGY | Facility: CLINIC | Age: 25
End: 2025-01-14
Payer: COMMERCIAL

## 2025-01-20 NOTE — TELEPHONE ENCOUNTER
Case Management Progress Note    Patient name Samantha Rodriguez  [facility]N [id number]  : 1961 Date 2025       LOS (days): 11  Geometric Mean LOS (GMLOS) (days): 5.5  Days to GMLOS:-5.1        OBJECTIVE:  Current admission status: Inpatient  Preferred Pharmacy:   [place], [place] -  [facility]   [facility] [address]  Phone: [phone] Fax: [phone]    [facility]/pharmacy #[id number] - [place] - [address]  [address] [facility] [place] [address]  Phone: [phone] Fax: [phone]    [facility] [place]  [place] - [address]  [address]  [place] [address]  Phone: [phone] Fax: [phone]    [facility] [place], [place] - [address]  [address] [facility] [address]  Phone: [phone] Fax: [phone]    [facility] [place] - [address]  [address]  [place] [address]  Phone: [phone] Fax: [phone]    Primary Care Provider: Glo Valente DO    Primary Insurance: [facility] [employer] [facility]  Secondary Insurance: [facility]    PROGRESS NOTE:    Pt anticipated to be medically cleared for dc tomorrow. Awaiting updated PT note to submit for auth.       LVM for pt to return call back to clinic.      Patient needs TB orders.

## 2025-01-21 ENCOUNTER — PATIENT MESSAGE (OUTPATIENT)
Dept: FAMILY MEDICINE | Facility: CLINIC | Age: 25
End: 2025-01-21

## 2025-01-21 ENCOUNTER — OFFICE VISIT (OUTPATIENT)
Dept: FAMILY MEDICINE | Facility: CLINIC | Age: 25
End: 2025-01-21
Payer: COMMERCIAL

## 2025-01-21 VITALS — SYSTOLIC BLOOD PRESSURE: 110 MMHG | DIASTOLIC BLOOD PRESSURE: 80 MMHG

## 2025-01-21 DIAGNOSIS — G35 MULTIPLE SCLEROSIS: ICD-10-CM

## 2025-01-21 DIAGNOSIS — G40.909 NONINTRACTABLE EPILEPSY WITHOUT STATUS EPILEPTICUS, UNSPECIFIED EPILEPSY TYPE: ICD-10-CM

## 2025-01-21 DIAGNOSIS — G04.91 MYELITIS, UNSPECIFIED: ICD-10-CM

## 2025-01-21 DIAGNOSIS — R53.83 FATIGUE, UNSPECIFIED TYPE: ICD-10-CM

## 2025-01-21 DIAGNOSIS — E66.01 SEVERE OBESITY (BMI 35.0-39.9) WITH COMORBIDITY: Primary | ICD-10-CM

## 2025-01-21 RX ORDER — TIRZEPATIDE 5 MG/.5ML
5 INJECTION, SOLUTION SUBCUTANEOUS
Qty: 4 PEN | Refills: 3 | Status: SHIPPED | OUTPATIENT
Start: 2025-01-21 | End: 2025-01-28

## 2025-01-21 NOTE — PROGRESS NOTES
[facility]  Progress Note    SUBJECTIVE:     Chief Complaint   Patient presents with    Fatigue       The patient location is: Home  The chief complaint leading to consultation is: Fatigue    Visit type: Virtual visit with synchronous audio and video  Total time spent with patient: 25  Each patient to whom he or she provides medical services by telemedicine is:  (1) informed of the relationship between the physician and patient and the respective role of any other health care provider with respect to management of the patient; and (2) notified that he or she may decline to receive medical services by telemedicine and may withdraw from such care at any time.      HPI: Patient is a 24 y.o. female via virtual visit, here for f/u of her chronic conditions. Also has some fatigue which she would like to get some labs checked out. Gets at least 10 hours of sleep at night. Patient has no other new complaints/problems at this time.      Review of patient's allergies indicates:   Allergen Reactions    Hazelnut Hives and Rash       Past Medical History:   Diagnosis Date    Factor 5 Leiden mutation, heterozygous 09/13/2016    Multiple sclerosis 04/2023    Seizures     1 at the age of 10     Past Surgical History:   Procedure Laterality Date    ESOPHAGOGASTRODUODENOSCOPY N/A 4/24/2024    Procedure: EGD (ESOPHAGOGASTRODUODENOSCOPY);  Surgeon: Lei Naranjo MD;  Location: [facility] ([address] [place] [address]);  Service: Endoscopy;  Laterality: N/A;  2/22 pt rescheduled, portal -ml  4/19-lvm for precall-MS    TONSILLECTOMY  6 years old     Family History   Problem Relation Name Age of Onset    Hypertension Mother      Irregular menses Mother      Factor V Leiden deficiency Mother      Interstitial cystitis Mother      No Known Problems Father      Factor V Leiden deficiency Sister      Interstitial cystitis Maternal Aunt      Factor V Leiden deficiency Maternal Grandmother      Thyroid disease Maternal Grandmother          hypothyroidism     Mitral valve prolapse Maternal Grandfather      Congenital heart disease Maternal Grandfather          valve    Crohn's disease Paternal Grandmother      Breast cancer Neg Hx      Colon cancer Neg Hx      Ovarian cancer Neg Hx      Arrhythmia Neg Hx      Early death Neg Hx      Heart attacks under age 50 Neg Hx      Pacemaker/defibrilator Neg Hx      Esophageal cancer Neg Hx       Social History     Tobacco Use    Smoking status: Never    Smokeless tobacco: Never   Substance Use Topics    Alcohol use: Not Currently     Comment: social drink once sa month per the patient    Drug use: No        Review of Systems   Constitutional:  Negative for chills and fever.   HENT: Negative.  Negative for hearing loss.    Eyes:  Negative for discharge.   Respiratory: Negative.  Negative for shortness of breath and wheezing.    Cardiovascular: Negative.  Negative for chest pain and palpitations.   Gastrointestinal: Negative.  Negative for abdominal pain, constipation, diarrhea, nausea and vomiting.   Genitourinary: Negative.  Negative for hematuria.   Neurological:  Negative for headaches.   All other systems reviewed and are negative.    OBJECTIVE:     Vitals:    01/21/25 0926   BP: 110/80     There is no height or weight on file to calculate BMI.    Physical Exam    Old records were reviewed. Labs and/or images were independently reviewed.    ASSESSMENT     1. Severe obesity (BMI 35.0-39.9) with comorbidity    2. Multiple sclerosis    3. Myelitis, unspecified    4. Nonintractable epilepsy without status epilepticus, unspecified epilepsy type    5. Fatigue, unspecified type        PLAN:     Severe obesity (BMI 35.0-39.9) with comorbidity  -     Restart tirzepatide (MOUNJARO) 5 mg/0.5 mL PnIj; Inject 5 mg into the skin every 7 days.  Dispense: 4 Pen; Refill: 3  -    I have discussed the common side effects of this medication with the patient and answered all of the questions they had at the time of this visit regarding this  medication.  -     Counseled patient about healthy diet, exercise habits, and to increase physical activity.    Multiple sclerosis/Myelitis, unspecified  -     tirzepatide (MOUNJARO) 5 mg/0.5 mL PnIj; Inject 5 mg into the skin every 7 days.  Dispense: 4 Pen; Refill: 3    Nonintractable epilepsy without status epilepticus, unspecified epilepsy type   -     Stable. Continue current regimen.    Fatigue, unspecified type  -     Ambulatory referral/consult to Sleep Disorders; Future; Expected date: 01/28/2025  -     TSH; Future  -     T4, Free; Future  -     Vitamin D; Future; Expected date: 01/21/2025  -     VITAMIN A; Future; Expected date: 01/21/2025  -     check vitamin levels at pt [facility].      RTC FIDE Rendon MD  01/21/2025 9:34 AM

## 2025-01-26 ENCOUNTER — PATIENT MESSAGE (OUTPATIENT)
Dept: FAMILY MEDICINE | Facility: CLINIC | Age: 25
End: 2025-01-26
Payer: COMMERCIAL

## 2025-01-26 DIAGNOSIS — E66.01 SEVERE OBESITY (BMI 35.0-39.9) WITH COMORBIDITY: Primary | ICD-10-CM

## 2025-03-07 ENCOUNTER — PATIENT MESSAGE (OUTPATIENT)
Dept: PSYCHIATRY | Facility: CLINIC | Age: 25
End: 2025-03-07
Payer: COMMERCIAL

## 2025-03-10 ENCOUNTER — PATIENT MESSAGE (OUTPATIENT)
Dept: NEUROLOGY | Facility: CLINIC | Age: 25
End: 2025-03-10
Payer: COMMERCIAL

## 2025-03-20 ENCOUNTER — OFFICE VISIT (OUTPATIENT)
Dept: NEUROLOGY | Facility: CLINIC | Age: 25
End: 2025-03-20
Payer: COMMERCIAL

## 2025-03-20 VITALS
HEIGHT: 61 IN | HEART RATE: 95 BPM | SYSTOLIC BLOOD PRESSURE: 104 MMHG | WEIGHT: 191.94 LBS | DIASTOLIC BLOOD PRESSURE: 68 MMHG | BODY MASS INDEX: 36.24 KG/M2

## 2025-03-20 DIAGNOSIS — G35 MULTIPLE SCLEROSIS: Primary | Chronic | ICD-10-CM

## 2025-03-20 PROCEDURE — 99215 OFFICE O/P EST HI 40 MIN: CPT | Mod: S$GLB,,,

## 2025-03-20 PROCEDURE — 1159F MED LIST DOCD IN RCRD: CPT | Mod: CPTII,S$GLB,,

## 2025-03-20 PROCEDURE — 3078F DIAST BP <80 MM HG: CPT | Mod: CPTII,S$GLB,,

## 2025-03-20 PROCEDURE — G2211 COMPLEX E/M VISIT ADD ON: HCPCS | Mod: S$GLB,,,

## 2025-03-20 PROCEDURE — 99999 PR PBB SHADOW E&M-EST. PATIENT-LVL IV: CPT | Mod: PBBFAC,,,

## 2025-03-20 PROCEDURE — 3074F SYST BP LT 130 MM HG: CPT | Mod: CPTII,S$GLB,,

## 2025-03-20 PROCEDURE — 3008F BODY MASS INDEX DOCD: CPT | Mod: CPTII,S$GLB,,

## 2025-03-20 PROCEDURE — 1160F RVW MEDS BY RX/DR IN RCRD: CPT | Mod: CPTII,S$GLB,,

## 2025-03-20 NOTE — PROGRESS NOTES
Patient ID: Zeny Charles is a 24 y.o. female who presents today for a fit-in clinic visit for MS.  She was last seen by Dr. Funk on 12/4/2024.  The history was provided by the patient. She is accompanied by her mother, Dev.     NEURO MULTIPLE SCLEROISIS SUMMARY:   Principle neurological diagnosis:  MS  Date of Symptom Onset:  2022 - Comments: but possibly earlier  Date of Diagnosis:  2023  Disease type at diagnosis:  Relapsing-Remitting MS  Disease type currently:  Relapsing-Remitting MS  Previous Therapy:  First DMT:  Ofatumumab - [id number] (pregnancy - last injection 2/2025)  Current Therapy:  None  Last MRI Brain:  11/13/2024 - stable  Last MRI C-Spine:  11/13/2024 - stable  Last MRI T-Spine:  11/13/2024 - no lesions  Labs   No CSF completed  Date JCV Completed:  9/18/2024  JCV Index:  1.33  JCV Antibody:  Positive  Other relevant labs and studies:    3/7/2023: NMO, MOG, and other mimics - negative   7/11/2024: vitamin D - 30.2  9/18/2024: NfL - 3.8      Subjective:     She recently found out she was pregnant.  Her LMP was on 2/9 which puts her around 5 weeks with a potential due date of 11/16.  She is still needing to see her OB, which it is scheduled for April (at 8 weeks).  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      Her last injection of Kesimpta was also 2/9. She held the March injection. She does have 2 pens on hand.     She is feeling stable overall.     ROS:      3/19/2025     7:26 PM   REVIEW OF SYMPTOMS   Do you feel abnormally tired on most days? No   Do you feel you generally sleep well? Yes   Do you have difficulty controlling your bladder?  No   Do you have difficulty controlling your bowels?  No   Do you have frequent muscle cramps, tightness or spasms in your limbs?  No   Do you have new visual symptoms?  No   Do you have worsening difficulty with your memory or thinking? No   Do you have worsening symptoms of anxiety or depression?  No   For patients who walk, Do you  have more difficulty walking?  No   Have you fallen since your last visit?  No   For patients who use wheelchairs: Do you have any skin wounds or breakdown? Not Applicable   Do you have difficulty using your hands?  No   Do you have shooting or burning pain? No   Do you have difficulty with sexual function?  No   If you are sexually active, are you using birth control? Y/N  N/A No   Do you often choke when swallowing liquids or solid food?  No   Do you experience worsening symptoms when overheated? Yes   Do you need any new equipment such as a wheelchair, walker or shower chair? No   Do you receive co-pay financial assistance for your principal MS medicine? Yes   Would you be interested in participating in an MS research trial in the future? Yes   For patients on Gilenya, Tecfidera, Aubagio, Rituxan, Ocrevus, Tysabri, Lemtrada or Methotrexate, are you aware that you should NOT receive live virus vaccines?  Not Applicable   Do you feel you have adequate family/friend support?  Yes   Do you have health insurance?   Yes   Are you currently employed? Yes   Do you receive SSDI/SSI?  Not Applicable   Do you use marijuana or cannabis products? No   Have you been diagnosed with a urinary tract infection since your last visit here? No   Have you been diagnosed with a respiratory tract infection since your last visit here? No   Have you been to the emergency room since your last visit here? No   Have you been hospitalized since your last visit here?  No            3/19/2025     7:28 PM   FSS SCORE & INTERPRETATION   FSS SCORE  26    FSS SCORE INTERPRETATION May not be suffering from fatigue        Patient-reported         3/19/2025     7:27 PM   MS MAYELA-D SCORE & INTERPRETATION   MAYELA-D SCORE  5    MAYELA-D INTERPRETATION  No indication of Depression        Patient-reported         3/19/2025     7:26 PM   MS JENNIFER-7 SCORE & INTERPRETATION   JENNIFER-7 SCORE  1    JENNIFER-7 SCORE INTERPRETATION Normal        Patient-reported         3/19/2025      7:28 PM   PEQ MS MOS PAIN EFFECTS SCORE & INTERPRETATION   PES SCORE 6    PES SCORE INTERPRETATION Scores can range from 6-30.  Items are scaled so that higher scores indicate a greater impact of pain on a patients mood and behavior.        Patient-reported         3/19/2025     7:29 PM   PEQ MS SEXUAL SATISFACTION SCORE & INTERPRETATION   SSS SCORE  4    SSS SCORE INTERPRETATION Scores can range from 4-24.  Higher scores indicate greater problems with sexual satisfaction.        Patient-reported         3/19/2025     7:30 PM   MS BLADDER CONTROL SCORE & INTERPRETATION   BLCS SCORE 0    BLCS SCORE INTERPRETATION  Scores can range from 0-22, with higher scores indicating greater bladder control problems.        Patient-reported         3/19/2025     7:31 PM   MS BOWEL CONTROL SCORE & INTERPRETATION   BWCS SCORE 0    BWCS SCORE INTERPRETATION Scores can range from 0-26, with higher scores indicating greater bowel control problems.        Patient-reported         3/19/2025     7:30 PM   PEQ MS IMPACT OF VISUAL IMPAIRMENT SCORE & INTERPRETATION   LEELEE SCALE SCORE  0    LEELEE SCORE INTERPRETATION Scores can range from 0-15, with higher scores indicating greater impact of visual problems on daily activites.        Patient-reported         3/19/2025     7:31 PM   MS PDQ SCORE & INTERPRETATION   PDQ RETROSPECTIVE MEMORY SUBSCALE 0    PDQ ATTENTION/CONCENTRATION SUBSCALE 2    PDQ PROSPECTIVE MEMORY SUBSCALE 3    PDQ PLANNING/ORGANIZATION SUBSCALE 2    PDQ TOTAL SCORE 7    PDQ SCORE INTERPRETATION Scores can range from 0-80, with higher scores indicating greater perceived cognitive impairment.        Patient-reported          No data to display                 SOCIAL HISTORY  Living arrangements - the patient lives with their family.  Social History     Socioeconomic History    Marital status: Single   Tobacco Use    Smoking status: Never    Smokeless tobacco: Never   Substance and Sexual Activity    Alcohol use: Not  Currently     Comment: social drink once sa month per the patient    Drug use: No    Sexual activity: Yes     Partners: Male   Other Topics Concern    Are you pregnant or think you may be? No    Breast-feeding No   Social History Narrative    Lives with Mom and Dad. No smokers. No alcohol, tobacco, illicit drugs. 1 dog.      Social Drivers of Health     Financial Resource Strain: Low Risk  (1/31/2023)    Received from [facility]    Overall Financial Resource Strain (CARDIA)     Difficulty of Paying Living Expenses: Not very hard   Food Insecurity: No Food Insecurity (1/31/2023)    Received from [facility]    Hunger Vital Sign     Worried About Running Out of Food in the Last Year: Never true     Ran Out of Food in the Last Year: Never true   Transportation Needs: No Transportation Needs (1/31/2023)    Received from [facility]    PRAPARE - Transportation     Lack of Transportation (Medical): No     Lack of Transportation (Non-Medical): No   Physical Activity: Insufficiently Active (3/16/2025)    Received from [facility]    Exercise Vital Sign     Days of Exercise per Week: 1 day     Minutes of Exercise per Session: 20 min   Stress: No Stress Concern Present (3/16/2025)    Received from [facility]    [employer] - Occupational Stress Questionnaire     Feeling of Stress : Only a little   Housing Stability: Low Risk  (1/31/2023)    Received from [facility]    Housing Stability Vital Sign     Unable to Pay for Housing in the Last Year: No     Number of Places Lived in the Last Year: 2     Unstable Housing in the Last Year: No       Medications Ordered Prior to Encounter[1]    Objective:     1. 25 foot timed walk:      12/4/2024     8:40 AM 3/20/2025     9:30 AM   Timed 25 Foot Walk:   Did patient wear an AFO? No No   Was assistive device used? No No   Time for 25 Foot Walk (seconds) 4.43 4.13   Time for 25 Foot Walk (seconds) 4.76 4.13       2. SDMT       No data to display                        NEURO EXAM    In general, the patient is well nourished and appears to be in no acute distress.    MENTAL STATUS: language is fluent, normal verbal comprehension, short-term and remote memory is intact, attention is normal, patient is alert and oriented x 3, fund of knowlege is appropriate by vocabulary.     CRANIAL NERVE EXAM:  There is no internuclear ophthalmoplegia.  Extraocular muscles are intact. No facial asymmetry. Facial sensation is intact bilaterally. There is no dysarthria. Uvula is midline, and palate moves symmetrically. Shoulder shrug intact bilaterally. Tongue protrusion is midline. Hearing is intact to finger rub bilaterally. Neck is supple with full ROM    MOTOR EXAM: Normal bulk and tone throughout UE and LE bilaterally. Rapid sequential movements are normal; Strength is  5/5 in all groups in the lower extremities and upper extremities    REFLEXES: 2+ and symmetric throughout in all four extremities    SENSORY EXAM: Normal to light touch throughout and slightly decreased vibration throughout    COORDINATION: Normal finger-to-nose exam. Normal heel-to-shin exam.    GAIT: Narrow based and stable. Able to heel walk, toe walk, and perform tandem gait.     Negative Romberg's    Imaging: personally reviewed     Results for orders placed during the hospital encounter of 11/13/24    MRI Brain Demyelinating Without Contrast    Impression  Scattered lesions in the brain and cervical spinal cord in keeping with areas of prior demyelination in patient reported history of multiple sclerosis.  Lesions are similar in distribution, although several have decreased in size compared to prior exam.   No new discrete lesions to indicate ongoing demyelination.    Limited evaluation of the thoracic spinal cord due to motion, but no definite demyelinating lesions in this region.    Electronically signed by resident: Dean Mariscal  Date:    11/13/2024  Time:    13:21    Electronically signed by: Boris Rivera  MD  Date:    11/13/2024  Time:    14:35    Results for orders placed during the hospital encounter of 11/13/24    MRI Cervical Spine Demyelinating Without Contrast    Impression  Scattered lesions in the brain and cervical spinal cord in keeping with areas of prior demyelination in patient reported history of multiple sclerosis.  Lesions are similar in distribution, although several have decreased in size compared to prior exam.   No new discrete lesions to indicate ongoing demyelination.    Limited evaluation of the thoracic spinal cord due to motion, but no definite demyelinating lesions in this region.    Electronically signed by resident: Dean Mariscal  Date:    11/13/2024  Time:    13:21    Electronically signed by: Boris Rivera MD  Date:    11/13/2024  Time:    14:35    Results for orders placed during the hospital encounter of 11/13/24    MRI Thoracic Spine Demyelinating Without Contrast    Impression  Scattered lesions in the brain and cervical spinal cord in keeping with areas of prior demyelination in patient reported history of multiple sclerosis.  Lesions are similar in distribution, although several have decreased in size compared to prior exam.   No new discrete lesions to indicate ongoing demyelination.    Limited evaluation of the thoracic spinal cord due to motion, but no definite demyelinating lesions in this region.    Electronically signed by resident: Dean Mariscal  Date:    11/13/2024  Time:    13:21    Electronically signed by: Boris Rivera MD  Date:    11/13/2024  Time:    14:35    Results for orders placed during the hospital encounter of 02/07/23    MRI Brain Demyelinating W W/O Contrast    Impression  Active demyelinating disease as described progressed since the prior exam.      Electronically signed by: Arnav Valencia Jr  Date:    02/08/2023  Time:    10:10    Results for orders placed during the hospital encounter of 02/07/23    MRI Cervical Spine Demyelinating W W/O  "Contrast    Impression  Findings in keeping with patient's known demyelinating disease with faint areas of enhancement in the spinal cord that may reflect active demyelination.      Electronically signed by: Arnav Valencia Jr  Date:    02/08/2023  Time:    10:29    No results found for this or any previous visit.        Labs: personally reviewed      Lab Results   Component Value Date    QBCQOOGO23LQ 19 (L) 02/03/2023     Lab Results   Component Value Date    JCVINDEX 1.33 (H) 09/18/2024    JCVANTIBODY POSITIVE (A) 09/18/2024     No results found for: "KX8EUCPQ", "ABSOLUTECD3", "TZ0PGYBZ", "ABSOLUTECD8", "MG8PQBXO", "ABSOLUTECD4", "LABCD48"  Lab Results   Component Value Date    WBC 7.98 09/18/2024    WBC 7.98 09/18/2024    RBC 5.16 09/18/2024    RBC 5.16 09/18/2024    HGB 15.7 09/18/2024    HGB 15.7 09/18/2024    HCT 48.5 09/18/2024    HCT 48.5 09/18/2024    MCV 94 09/18/2024    MCV 94 09/18/2024    MCH 30.4 09/18/2024    MCH 30.4 09/18/2024    MCHC 32.4 09/18/2024    MCHC 32.4 09/18/2024    RDW 12.2 09/18/2024    RDW 12.2 09/18/2024     09/18/2024     09/18/2024    MPV 12.4 09/18/2024    MPV 12.4 09/18/2024    GRAN 5.3 09/18/2024    GRAN 66.2 09/18/2024    GRAN 5.3 09/18/2024    GRAN 66.2 09/18/2024    LYMPH 2.0 09/18/2024    LYMPH 24.6 09/18/2024    LYMPH 2.0 09/18/2024    LYMPH 24.6 09/18/2024    MONO 0.6 09/18/2024    MONO 7.4 09/18/2024    MONO 0.6 09/18/2024    MONO 7.4 09/18/2024    EOS 0.1 09/18/2024    EOS 0.1 09/18/2024    BASO 0.06 09/18/2024    BASO 0.06 09/18/2024    EOSINOPHIL 0.6 09/18/2024    EOSINOPHIL 0.6 09/18/2024    BASOPHIL 0.8 09/18/2024    BASOPHIL 0.8 09/18/2024     Sodium   Date Value Ref Range Status   09/18/2024 137 136 - 145 mmol/L Final   09/18/2024 137 136 - 145 mmol/L Final     Potassium   Date Value Ref Range Status   09/18/2024 3.7 3.5 - 5.1 mmol/L Final   09/18/2024 3.7 3.5 - 5.1 mmol/L Final     Chloride   Date Value Ref Range Status   09/18/2024 101 95 - 110 " mmol/L Final   09/18/2024 101 95 - 110 mmol/L Final     CO2   Date Value Ref Range Status   09/18/2024 25 23 - 29 mmol/L Final   09/18/2024 25 23 - 29 mmol/L Final     Glucose   Date Value Ref Range Status   09/18/2024 83 70 - 110 mg/dL Final   09/18/2024 83 70 - 110 mg/dL Final     BUN   Date Value Ref Range Status   09/18/2024 7 6 - 20 mg/dL Final   09/18/2024 7 6 - 20 mg/dL Final     Creatinine   Date Value Ref Range Status   09/18/2024 0.7 0.5 - 1.4 mg/dL Final   09/18/2024 0.7 0.5 - 1.4 mg/dL Final     Calcium   Date Value Ref Range Status   09/18/2024 9.5 8.7 - 10.5 mg/dL Final   09/18/2024 9.5 8.7 - 10.5 mg/dL Final     Total Protein   Date Value Ref Range Status   09/18/2024 8.0 6.0 - 8.4 g/dL Final   09/18/2024 8.0 6.0 - 8.4 g/dL Final     Albumin   Date Value Ref Range Status   09/18/2024 4.2 3.5 - 5.2 g/dL Final   09/18/2024 4.2 3.5 - 5.2 g/dL Final     Total Bilirubin   Date Value Ref Range Status   09/18/2024 0.6 0.1 - 1.0 mg/dL Final     Comment:     For infants and newborns, interpretation of results should be based  on gestational age, weight and in agreement with clinical  observations.    Premature Infant recommended reference ranges:  Up to 24 hours.............<8.0 mg/dL  Up to 48 hours............<12.0 mg/dL  3-5 days..................<15.0 mg/dL  6-29 days.................<15.0 mg/dL     09/18/2024 0.6 0.1 - 1.0 mg/dL Final     Comment:     For infants and newborns, interpretation of results should be based  on gestational age, weight and in agreement with clinical  observations.    Premature Infant recommended reference ranges:  Up to 24 hours.............<8.0 mg/dL  Up to 48 hours............<12.0 mg/dL  3-5 days..................<15.0 mg/dL  6-29 days.................<15.0 mg/dL       Alkaline Phosphatase   Date Value Ref Range Status   09/18/2024 68 55 - 135 U/L Final   09/18/2024 68 55 - 135 U/L Final     AST   Date Value Ref Range Status   09/18/2024 19 10 - 40 U/L Final   09/18/2024 19  10 - 40 U/L Final     ALT   Date Value Ref Range Status   09/18/2024 34 10 - 44 U/L Final   09/18/2024 34 10 - 44 U/L Final     Anion Gap   Date Value Ref Range Status   09/18/2024 11 8 - 16 mmol/L Final   09/18/2024 11 8 - 16 mmol/L Final     eGFR if    Date Value Ref Range Status   03/17/2021 >60 >60 mL/min/1.73 m^2 Final     eGFR if non    Date Value Ref Range Status   03/17/2021 >60 >60 mL/min/1.73 m^2 Final     Comment:     Calculation used to obtain the estimated glomerular filtration  rate (eGFR) is the CKD-EPI equation.        Lab Results   Component Value Date    HEPBSAG Non-reactive 09/18/2024    HEPBSAB 9.90 09/18/2024    HEPBSAB Grayzone 09/18/2024    HEPBCAB Non-reactive 09/18/2024           MS Impression and Plan:     NEURO MULTIPLE SCLEROSIS IMPRESSION:   Number of relapses in the past year?:  0  Clinical Progression:  Clinically Stable  MRI Progression:  Stable  MS Classification:  Relapsing-Remitting MS  Current DMT: none  DMT:  No change in management  DMT comment:  Will continue to monitor off DMT during pregnancy   Symptom Management:  No change in symptom management  Additional Impressions:   Discussed DMT option (Copaxone) vs staying off DMT during pregnancy as research shows she has a lowered risk of relapse during pregnancy.  She has decided to stay off DMT.  Also discussed monitoring during pregnancy and plan for postpartum. She is stable neurologically and thankfully has a very benign MS history - holding steroids in the hospital and starting back on DMT right after may be a good option. She is still deciding is she is wanting to breastfeed or not, will determine DMT once she has made that decision.   Will obtain MRI (brain and spine w/o) soon.   Follow up virtually in 3 months     Plan discussed and questions were answered to satisfaction.     Problem List Items Addressed This Visit       Multiple sclerosis - Primary (Chronic)    Relevant Orders    MRI Brain  Demyelinating Without Contrast    MRI Cervical Spine Demyelinating Without Contrast    MRI Thoracic Spine Demyelinating Without Contrast          I spent a total of 40 minutes on the day of the visit.This includes face to face time and non-face to face time preparing to see the patient (eg, review of tests), obtaining and/or reviewing separately obtained history, documenting clinical information in the electronic or other health record, independently interpreting results and communicating results to the patient/family/caregiver, or care coordinator.       DEBBIE Donahue         [1]   Current Outpatient Medications on File Prior to Visit   Medication Sig Dispense Refill    cholecalciferol, vitamin D3, 1,250 mcg (50,000 unit) capsule Take 1 capsule (50,000 Units total) by mouth every 7 days. 12 capsule 3    dextroamphetamine-amphetamine 10 mg Tab Take 1 tablet (10 mg total) by mouth 2 (two) times daily. (Patient not taking: Reported on 3/20/2025) 60 tablet 0    dextroamphetamine-amphetamine 10 mg Tab Take 1 tablet (10 mg total) by mouth 2 (two) times daily. (Patient not taking: Reported on 3/20/2025) 60 tablet 0    dextroamphetamine-amphetamine 10 mg Tab Take 1 tablet (10 mg total) by mouth 2 (two) times daily. (Patient not taking: Reported on 3/20/2025) 60 tablet 0    EScitalopram oxalate (LEXAPRO) 10 MG tablet Take 1 tablet (10 mg total) by mouth once daily. (Patient not taking: Reported on 3/20/2025) 90 tablet 3    KESIMPTA PEN 20 mg/0.4 mL PnIj every 30 days. (Patient not taking: Reported on 3/20/2025)      ondansetron (ZOFRAN-ODT) 4 MG TbDL Take 1 tablet (4 mg total) by mouth every 8 (eight) hours as needed (nausea). (Patient not taking: Reported on 3/20/2025) 20 tablet 3    tirzepatide 10 mg/0.5 mL PnIj Inject 10 mg into the skin every 7 days. (Patient not taking: Reported on 3/20/2025) 4 Pen 3    valACYclovir (VALTREX) 1000 MG tablet SMARTSI.0 Tablet(s) By Mouth Twice Daily (Patient not taking:  Reported on 3/20/2025)       No current facility-administered medications on file prior to visit.

## 2025-03-20 NOTE — LETTER
March 20, 2025      [facility] 6[address]  [address]  [facility] [address]  Phone: [phone]       Patient: Zeny Charles   YOB: 2000  Date of Visit: 03/20/2025    To Whom It May Concern:    Zeny Charles  was at [facility] on 03/20/2025. She was accompanied by her mother, Dev Charles. Please excuse Ms. Méndez from any time missed. If you have any questions or concerns, or if I can be of further assistance, please do not hesitate to contact me.    Sincerely,        Rut Calvin MA

## 2025-03-28 ENCOUNTER — INITIAL PRENATAL (OUTPATIENT)
Dept: OBSTETRICS AND GYNECOLOGY | Facility: CLINIC | Age: 25
End: 2025-03-28
Payer: COMMERCIAL

## 2025-03-28 VITALS
DIASTOLIC BLOOD PRESSURE: 72 MMHG | BODY MASS INDEX: 36.21 KG/M2 | SYSTOLIC BLOOD PRESSURE: 106 MMHG | WEIGHT: 191.81 LBS | HEIGHT: 61 IN

## 2025-03-28 DIAGNOSIS — D68.51 HETEROZYGOUS FACTOR V LEIDEN MUTATION: ICD-10-CM

## 2025-03-28 DIAGNOSIS — A60.00 GENITAL HERPES SIMPLEX, UNSPECIFIED SITE: ICD-10-CM

## 2025-03-28 DIAGNOSIS — Z32.01 POSITIVE PREGNANCY TEST: Primary | ICD-10-CM

## 2025-03-28 DIAGNOSIS — G35 MS (MULTIPLE SCLEROSIS): ICD-10-CM

## 2025-03-28 PROCEDURE — 87086 URINE CULTURE/COLONY COUNT: CPT | Performed by: OBSTETRICS & GYNECOLOGY

## 2025-03-28 PROCEDURE — 99999 PR PBB SHADOW E&M-EST. PATIENT-LVL III: CPT | Mod: PBBFAC,,, | Performed by: OBSTETRICS & GYNECOLOGY

## 2025-03-28 NOTE — PROGRESS NOTES
CC: Absence of menses    Zeny Charles is a 24 y.o. female  presents with complaint of absence of menstruation.  She denies nausea/vomIting/abdominal pain/  SHE had some bleeding/ spotting.  UPT is positive.     Past Medical History:   Diagnosis Date    Factor 5 Leiden mutation, heterozygous 2016    Multiple sclerosis 2023    Seizures     1 at the age of 10     Past Surgical History:   Procedure Laterality Date    ESOPHAGOGASTRODUODENOSCOPY N/A 2024    Procedure: EGD (ESOPHAGOGASTRODUODENOSCOPY);  Surgeon: Lei Naranjo MD;  Location: [facility] ([address] [place] [address]);  Service: Endoscopy;  Laterality: N/A;   pt rescheduled, portal -ml  -lvm for precall-MS    TONSILLECTOMY  6 years old     Social History     Socioeconomic History    Marital status: Single   Tobacco Use    Smoking status: Never    Smokeless tobacco: Never   Substance and Sexual Activity    Alcohol use: Not Currently     Comment: social drink once sa month per the patient    Drug use: No    Sexual activity: Yes     Partners: Male     Birth control/protection: None   Other Topics Concern    Are you pregnant or think you may be? No    Breast-feeding No   Social History Narrative    Lives with Mom and Dad. No smokers. No alcohol, tobacco, illicit drugs. 1 dog.      Social Drivers of Health     Financial Resource Strain: Low Risk  (2023)    Received from [facility]    Overall Financial Resource Strain (CARDIA)     Difficulty of Paying Living Expenses: Not very hard   Food Insecurity: No Food Insecurity (2023)    Received from [facility]    Hunger Vital Sign     Worried About Running Out of Food in the Last Year: Never true     Ran Out of Food in the Last Year: Never true   Transportation Needs: No Transportation Needs (2023)    Received from [facility]    PRAPARE - Transportation     Lack of Transportation (Medical): No     Lack of Transportation (Non-Medical): No   Physical Activity: Insufficiently Active (3/16/2025)     "Received from [facility]    Exercise Vital Sign     Days of Exercise per Week: 1 day     Minutes of Exercise per Session: 20 min   Stress: No Stress Concern Present (3/16/2025)    Received from [facility]    [place] [employer] - Occupational Stress Questionnaire     Feeling of Stress : Only a little   Housing Stability: Low Risk  (2023)    Received from [facility]    Housing Stability Vital Sign     Unable to Pay for Housing in the Last Year: No     Number of Places Lived in the Last Year: 2     Unstable Housing in the Last Year: No     Family History   Problem Relation Name Age of Onset    Crohn's disease Paternal Grandmother      Factor V Leiden deficiency Maternal Grandmother      Thyroid disease Maternal Grandmother          hypothyroidism    Mitral valve prolapse Maternal Grandfather      Congenital heart disease Maternal Grandfather          valve    No Known Problems Father      Hypertension Mother      Irregular menses Mother      Factor V Leiden deficiency Mother      Interstitial cystitis Mother      Factor V Leiden deficiency Sister      Interstitial cystitis Maternal Aunt      Breast cancer Neg Hx      Colon cancer Neg Hx      Ovarian cancer Neg Hx      Early death Neg Hx      Heart attacks under age 50 Neg Hx      Pacemaker/defibrilator Neg Hx      Esophageal cancer Neg Hx      Uterine cancer Neg Hx      Cervical cancer Neg Hx      Arrhythmia Neg Hx       OB History    Para Term  AB Living   1 0 0 0 0 0   SAB IAB Ectopic Multiple Live Births   0 0 0 0 0      # Outcome Date GA Lbr Toi/2nd Weight Sex Type Anes PTL Lv   1 Current                /72   Ht 5' 1" (1.549 m)   Wt 87 kg (191 lb 12.8 oz)   LMP 2025 (Approximate)   BMI 36.24 kg/m²     ROS:  GENERAL: Denies weight gain or weight loss. Feeling well overall.   SKIN: Denies rash or lesions.   HEAD: Denies head injury or headache.   NODES: Denies enlarged lymph nodes.   CHEST: Denies chest pain " or shortness of breath.   CARDIOVASCULAR: Denies palpitations or left sided chest pain.   ABDOMEN: No abdominal pain, constipation, diarrhea, nausea, vomiting or rectal bleeding.   URINARY: No frequency, dysuria, hematuria, or burning on urination.  REPRODUCTIVE: See HPI.   BREASTS: The patient performs breast self-examination and denies pain, lumps, or nipple discharge.   HEMATOLOGIC: No easy bruisability or excessive bleeding.   MUSCULOSKELETAL: Denies joint pain or swelling.   NEUROLOGIC: Denies syncope or weakness.   PSYCHIATRIC: Denies depression, anxiety or mood swings.    PE:   APPEARANCE: Well nourished, well developed, in no acute distress.  AFFECT: WNL, alert and oriented x 3.  SKIN: No acne or hirsutism.  NECK: Neck symmetric without masses or thyromegaly.  NODES: No inguinal, cervical, axillary or femoral lymph node enlargement.  CHEST: Good respiratory effort.   ABDOMEN: Soft. No tenderness or masses. No hepatosplenomegaly. No hernias.  BREASTS: Symmetrical, no skin changes or visible lesions. No palpable masses, nipple discharge bilaterally.  PELVIC: Normal external female genitalia without lesions. Normal hair distribution. Adequate perineal body, normal urethral meatus. Vagina moist and well rugated without lesions or discharge. Cervix pink, NO blood without lesions, discharge or tenderness. No significant cystocele or rectocele. Bimanual exam shows uterus is 7 weeks, regular, mobile and nontender. Adnexa without masses or tenderness.  EXTREMITIES: No edema.          ASSESSMENT and PLAN:    ICD-10-CM ICD-9-CM    1. Positive pregnancy test  Z32.01 V72.42 Hepatitis C Antibody      HIV 1/2 Ag/Ab (4th Gen)      Treponema Pallidium Antibodies IgG, IgM      Hepatitis B surface antigen      Type & Screen      Rubella antibody, IgG      Urine culture      CBC auto differential      Basic metabolic panel      US OB/GYN Procedure ([facility])      POCT urine pregnancy      C. trachomatis/N. gonorrhoeae by AMP  DNA [facility]; Cervicovaginal      Liquid-Based Pap Smear, Screening      2. MS (multiple sclerosis)  G35 340       3. Heterozygous factor V Leiden mutation  D68.51 289.81       4. Genital herpes simplex, unspecified site  A60.00 054.10             Patient was counseled today on proper weight gain based on the [place] [facility]'s recommendations based on her pre-pregnancy weight. Discussed foods to avoid in pregnancy (i.e. sushi, fish that are high in mercury, deli meat, and unpasteurized cheeses). Discussed prenatal vitamin options (i.e. stool softener, DHA). Contingency screen offered - patient desires.    Follow up in about 2 weeks (around 4/11/2025).  Bleeding and pain precautions  F/U on US to confirm pregnancy

## 2025-03-31 ENCOUNTER — HOSPITAL ENCOUNTER (OUTPATIENT)
Dept: PERINATAL CARE | Facility: OTHER | Age: 25
Discharge: HOME | End: 2025-03-31
Attending: OBSTETRICS & GYNECOLOGY
Payer: COMMERCIAL

## 2025-03-31 ENCOUNTER — PATIENT MESSAGE (OUTPATIENT)
Dept: OBSTETRICS AND GYNECOLOGY | Facility: CLINIC | Age: 25
End: 2025-03-31
Payer: COMMERCIAL

## 2025-03-31 DIAGNOSIS — Z32.01 POSITIVE PREGNANCY TEST: ICD-10-CM

## 2025-03-31 PROCEDURE — 76801 OB US < 14 WKS SINGLE FETUS: CPT

## 2025-03-31 PROCEDURE — 76801 OB US < 14 WKS SINGLE FETUS: CPT | Mod: 26,,, | Performed by: OBSTETRICS & GYNECOLOGY

## 2025-04-02 ENCOUNTER — HOSPITAL ENCOUNTER (OUTPATIENT)
Dept: RADIOLOGY | Facility: HOSPITAL | Age: 25
Discharge: HOME | End: 2025-04-02
Payer: COMMERCIAL

## 2025-04-02 ENCOUNTER — RESULTS FOLLOW-UP (OUTPATIENT)
Dept: NEUROLOGY | Facility: CLINIC | Age: 25
End: 2025-04-02

## 2025-04-02 DIAGNOSIS — G35 MULTIPLE SCLEROSIS: Chronic | ICD-10-CM

## 2025-04-02 PROCEDURE — 70551 MRI BRAIN STEM W/O DYE: CPT | Mod: 26,,, | Performed by: STUDENT IN AN ORGANIZED HEALTH CARE EDUCATION/TRAINING PROGRAM

## 2025-04-02 PROCEDURE — 70551 MRI BRAIN STEM W/O DYE: CPT | Mod: TC

## 2025-04-02 PROCEDURE — 72146 MRI CHEST SPINE W/O DYE: CPT | Mod: TC

## 2025-04-02 PROCEDURE — 72146 MRI CHEST SPINE W/O DYE: CPT | Mod: 26,,, | Performed by: STUDENT IN AN ORGANIZED HEALTH CARE EDUCATION/TRAINING PROGRAM

## 2025-04-02 PROCEDURE — 72141 MRI NECK SPINE W/O DYE: CPT | Mod: 26,,, | Performed by: STUDENT IN AN ORGANIZED HEALTH CARE EDUCATION/TRAINING PROGRAM

## 2025-04-02 PROCEDURE — 72141 MRI NECK SPINE W/O DYE: CPT | Mod: TC

## 2025-04-03 ENCOUNTER — PATIENT MESSAGE (OUTPATIENT)
Dept: PSYCHIATRY | Facility: CLINIC | Age: 25
End: 2025-04-03
Payer: COMMERCIAL

## 2025-04-10 ENCOUNTER — PATIENT MESSAGE (OUTPATIENT)
Dept: NEUROLOGY | Facility: CLINIC | Age: 25
End: 2025-04-10
Payer: COMMERCIAL

## 2025-04-14 ENCOUNTER — RESULTS FOLLOW-UP (OUTPATIENT)
Dept: FAMILY MEDICINE | Facility: CLINIC | Age: 25
End: 2025-04-14

## 2025-04-28 ENCOUNTER — HOSPITAL ENCOUNTER (EMERGENCY)
Facility: HOSPITAL | Age: 25
Discharge: HOME | End: 2025-04-29
Attending: STUDENT IN AN ORGANIZED HEALTH CARE EDUCATION/TRAINING PROGRAM
Payer: COMMERCIAL

## 2025-04-28 DIAGNOSIS — R19.7 NAUSEA VOMITING AND DIARRHEA: ICD-10-CM

## 2025-04-28 DIAGNOSIS — A08.4 VIRAL GASTROENTERITIS: Primary | ICD-10-CM

## 2025-04-28 DIAGNOSIS — Z3A.12 12 WEEKS GESTATION OF PREGNANCY: ICD-10-CM

## 2025-04-28 DIAGNOSIS — R11.2 NAUSEA VOMITING AND DIARRHEA: ICD-10-CM

## 2025-04-28 LAB
ABSOLUTE EOSINOPHIL (OHS): 0 K/UL
ABSOLUTE MONOCYTE (OHS): 0.69 K/UL (ref 0.3–1)
ABSOLUTE NEUTROPHIL COUNT (OHS): 7.16 K/UL (ref 1.8–7.7)
ALBUMIN SERPL BCP-MCNC: 3.4 G/DL (ref 3.5–5.2)
ALP SERPL-CCNC: 55 UNIT/L (ref 40–150)
ALT SERPL W/O P-5'-P-CCNC: 43 UNIT/L (ref 10–44)
ANION GAP (OHS): 14 MMOL/L (ref 8–16)
AST SERPL-CCNC: 30 UNIT/L (ref 11–45)
B-HCG UR QL: POSITIVE
BACTERIA #/AREA URNS AUTO: NORMAL /HPF
BASOPHILS # BLD AUTO: 0.02 K/UL
BASOPHILS NFR BLD AUTO: 0.2 %
BILIRUB SERPL-MCNC: 0.4 MG/DL (ref 0.1–1)
BILIRUB UR QL STRIP.AUTO: NEGATIVE
BUN SERPL-MCNC: 6 MG/DL (ref 6–20)
CALCIUM SERPL-MCNC: 8.7 MG/DL (ref 8.7–10.5)
CHLORIDE SERPL-SCNC: 107 MMOL/L (ref 95–110)
CLARITY UR: CLEAR
CO2 SERPL-SCNC: 17 MMOL/L (ref 23–29)
COLOR UR AUTO: YELLOW
CREAT SERPL-MCNC: 0.6 MG/DL (ref 0.5–1.4)
CTP QC/QA: YES
ERYTHROCYTE [DISTWIDTH] IN BLOOD BY AUTOMATED COUNT: 12.2 % (ref 11.5–14.5)
GFR SERPLBLD CREATININE-BSD FMLA CKD-EPI: >60 ML/MIN/1.73/M2
GLUCOSE SERPL-MCNC: 94 MG/DL (ref 70–110)
GLUCOSE UR QL STRIP: NEGATIVE
HCG INTACT+B SERPL-ACNC: NORMAL MIU/ML
HCT VFR BLD AUTO: 37.2 % (ref 37–48.5)
HGB BLD-MCNC: 12.8 GM/DL (ref 12–16)
HGB UR QL STRIP: NEGATIVE
HOLD SPECIMEN: NORMAL
IMM GRANULOCYTES # BLD AUTO: 0.04 K/UL (ref 0–0.04)
IMM GRANULOCYTES NFR BLD AUTO: 0.5 % (ref 0–0.5)
INFLUENZA A MOLECULAR (OHS): NEGATIVE
INFLUENZA B MOLECULAR (OHS): NEGATIVE
KETONES UR QL STRIP: ABNORMAL
LEUKOCYTE ESTERASE UR QL STRIP: ABNORMAL
LIPASE SERPL-CCNC: 12 U/L (ref 4–60)
LYMPHOCYTES # BLD AUTO: 0.49 K/UL (ref 1–4.8)
MCH RBC QN AUTO: 31.2 PG (ref 27–31)
MCHC RBC AUTO-ENTMCNC: 34.4 G/DL (ref 32–36)
MCV RBC AUTO: 91 FL (ref 82–98)
MICROSCOPIC COMMENT: NORMAL
NITRITE UR QL STRIP: NEGATIVE
NUCLEATED RBC (/100WBC) (OHS): 0 /100 WBC
PH UR STRIP: 6 [PH]
PLATELET # BLD AUTO: 219 K/UL (ref 150–450)
PMV BLD AUTO: 12.9 FL (ref 9.2–12.9)
POTASSIUM SERPL-SCNC: 3 MMOL/L (ref 3.5–5.1)
PROT SERPL-MCNC: 7.1 GM/DL (ref 6–8.4)
PROT UR QL STRIP: NEGATIVE
RBC # BLD AUTO: 4.1 M/UL (ref 4–5.4)
RBC #/AREA URNS AUTO: 1 /HPF (ref 0–4)
RELATIVE EOSINOPHIL (OHS): 0 %
RELATIVE LYMPHOCYTE (OHS): 5.8 % (ref 18–48)
RELATIVE MONOCYTE (OHS): 8.2 % (ref 4–15)
RELATIVE NEUTROPHIL (OHS): 85.3 % (ref 38–73)
RH BLD: NORMAL
SARS-COV-2 RDRP RESP QL NAA+PROBE: NEGATIVE
SODIUM SERPL-SCNC: 138 MMOL/L (ref 136–145)
SP GR UR STRIP: 1.01
SQUAMOUS #/AREA URNS AUTO: 4 /HPF
UROBILINOGEN UR STRIP-ACNC: ABNORMAL EU/DL
WBC # BLD AUTO: 8.4 K/UL (ref 3.9–12.7)
WBC #/AREA URNS AUTO: 2 /HPF (ref 0–5)

## 2025-04-28 PROCEDURE — 84702 CHORIONIC GONADOTROPIN TEST: CPT | Performed by: EMERGENCY MEDICINE

## 2025-04-28 PROCEDURE — U0002 COVID-19 LAB TEST NON-CDC: HCPCS | Performed by: EMERGENCY MEDICINE

## 2025-04-28 PROCEDURE — 96374 THER/PROPH/DIAG INJ IV PUSH: CPT

## 2025-04-28 PROCEDURE — 80053 COMPREHEN METABOLIC PANEL: CPT | Performed by: EMERGENCY MEDICINE

## 2025-04-28 PROCEDURE — 25000003 PHARM REV CODE 250: Performed by: STUDENT IN AN ORGANIZED HEALTH CARE EDUCATION/TRAINING PROGRAM

## 2025-04-28 PROCEDURE — 63600175 PHARM REV CODE 636 W HCPCS: Performed by: STUDENT IN AN ORGANIZED HEALTH CARE EDUCATION/TRAINING PROGRAM

## 2025-04-28 PROCEDURE — 96361 HYDRATE IV INFUSION ADD-ON: CPT

## 2025-04-28 PROCEDURE — 81001 URINALYSIS AUTO W/SCOPE: CPT | Performed by: EMERGENCY MEDICINE

## 2025-04-28 PROCEDURE — 83690 ASSAY OF LIPASE: CPT | Performed by: STUDENT IN AN ORGANIZED HEALTH CARE EDUCATION/TRAINING PROGRAM

## 2025-04-28 PROCEDURE — 86901 BLOOD TYPING SEROLOGIC RH(D): CPT | Performed by: EMERGENCY MEDICINE

## 2025-04-28 PROCEDURE — 85025 COMPLETE CBC W/AUTO DIFF WBC: CPT | Performed by: EMERGENCY MEDICINE

## 2025-04-28 PROCEDURE — 99284 EMERGENCY DEPT VISIT MOD MDM: CPT | Mod: 25

## 2025-04-28 PROCEDURE — 87502 INFLUENZA DNA AMP PROBE: CPT | Performed by: EMERGENCY MEDICINE

## 2025-04-28 PROCEDURE — 81025 URINE PREGNANCY TEST: CPT | Performed by: EMERGENCY MEDICINE

## 2025-04-28 RX ORDER — ONDANSETRON 4 MG/1
4 TABLET, FILM COATED ORAL EVERY 6 HOURS
Qty: 12 TABLET | Refills: 0 | Status: SHIPPED | OUTPATIENT
Start: 2025-04-28 | End: 2025-05-01

## 2025-04-28 RX ORDER — ONDANSETRON HYDROCHLORIDE 2 MG/ML
4 INJECTION, SOLUTION INTRAVENOUS
Status: COMPLETED | OUTPATIENT
Start: 2025-04-28 | End: 2025-04-28

## 2025-04-28 RX ORDER — POTASSIUM CHLORIDE 20 MEQ/1
40 TABLET, EXTENDED RELEASE ORAL ONCE
Status: COMPLETED | OUTPATIENT
Start: 2025-04-28 | End: 2025-04-28

## 2025-04-28 RX ADMIN — ONDANSETRON 4 MG: 2 INJECTION INTRAMUSCULAR; INTRAVENOUS at 10:04

## 2025-04-28 RX ADMIN — POTASSIUM CHLORIDE 40 MEQ: 1500 TABLET, EXTENDED RELEASE ORAL at 10:04

## 2025-04-28 RX ADMIN — SODIUM CHLORIDE 1000 ML: 9 INJECTION, SOLUTION INTRAVENOUS at 10:04

## 2025-04-28 NOTE — Clinical Note
"Zeny"Yara Charles was seen and treated in our emergency department on 4/28/2025.  She may return to work on 04/30/2025.       If you have any questions or concerns, please don't hesitate to call.      Carolynn Smith MD"

## 2025-04-29 VITALS
TEMPERATURE: 98 F | RESPIRATION RATE: 17 BRPM | SYSTOLIC BLOOD PRESSURE: 110 MMHG | BODY MASS INDEX: 36.25 KG/M2 | HEART RATE: 89 BPM | DIASTOLIC BLOOD PRESSURE: 75 MMHG | HEIGHT: 61 IN | WEIGHT: 192 LBS | OXYGEN SATURATION: 98 %

## 2025-04-29 NOTE — FIRST PROVIDER EVALUATION
Medical screening examination initiated.  I have conducted a focused provider triage encounter, findings are as follows:    Brief history of present illness:  12 weeks pregnant.  Has vomiting and lightheaded.  Had fever to 100.2    There were no vitals filed for this visit.    Pertinent physical exam:  Febrile to 100.4    Brief workup plan:  see orders    Preliminary workup initiated; this workup will be continued and followed by the physician or advanced practice provider that is assigned to the patient when roomed.

## 2025-04-29 NOTE — DISCHARGE INSTRUCTIONS
You were seen today for nausea, vomiting and diarrhea.  You were hydrated here in the emergency department.  You have been prescribed Zofran to help with nausea and vomiting.  Please return to the emergency department for any abdominal pain, vaginal bleeding, persistent fever, nausea and vomiting that has not controlled with the medication, signs of dehydration or any other worsening symptoms or concerns.  Please follow up with your OBGYN within the next week.

## 2025-04-29 NOTE — ED PROVIDER NOTES
Encounter Date: 2025       History     Chief Complaint   Patient presents with    Emesis     Reports 12wks preg +N/V. 100.2 Temp PTA.       HPI  The patient is a 24-year-old female  at appoximately 12 weeks gestation with history of a factor 5 Leiden, multiple sclerosis, seizure disorder, ADHD.  LMP was on .  Patient is already established with OBGYN in his had a confirmed IUP on ultrasound.  She presents today for nausea, vomiting and diarrhea.  Onset of symptoms earlier today.  She reports your at home.  She took Tylenol earlier today.  She denies abdominal pain but states that she has been having severe nausea as well as vomiting and diarrhea.  She has had multiple episodes of nonbloody, nonbilious emesis and reports proximally 14 episodes of loose, watery diarrhea today.  No chest pain, shortness of breath, headache, dysuria, hematuria, abnormal vaginal discharge or pelvic pain.  She denies any vaginal bleeding.  No known sick contacts.      Review of patient's allergies indicates:   Allergen Reactions    Hazelnut Hives and Rash     Past Medical History:   Diagnosis Date    Factor 5 Leiden mutation, heterozygous 2016    Multiple sclerosis 2023    Seizures     1 at the age of 10     Past Surgical History:   Procedure Laterality Date    ESOPHAGOGASTRODUODENOSCOPY N/A 2024    Procedure: EGD (ESOPHAGOGASTRODUODENOSCOPY);  Surgeon: Lei Naranjo MD;  Location: [address]);  Service: Endoscopy;  Laterality: N/A;   pt rescheduled, portal -ml  -lvm for precall-MS    TONSILLECTOMY  6 years old     Family History   Problem Relation Name Age of Onset    Crohn's disease Paternal Grandmother      Factor V Leiden deficiency Maternal Grandmother      Thyroid disease Maternal Grandmother          hypothyroidism    Mitral valve prolapse Maternal Grandfather      Congenital heart disease Maternal Grandfather          valve    No Known Problems Father      Hypertension Mother      Irregular  menses Mother      Factor V Leiden deficiency Mother      Interstitial cystitis Mother      Factor V Leiden deficiency Sister      Interstitial cystitis Maternal Aunt      Breast cancer Neg Hx      Colon cancer Neg Hx      Ovarian cancer Neg Hx      Early death Neg Hx      Heart attacks under age 50 Neg Hx      Pacemaker/defibrilator Neg Hx      Esophageal cancer Neg Hx      Uterine cancer Neg Hx      Cervical cancer Neg Hx      Arrhythmia Neg Hx       Social History[1]  Review of Systems  A full review of systems was obtained, see HPI for pertinent positives.    Physical Exam     Initial Vitals [04/28/25 1921]   BP Pulse Resp Temp SpO2   (!) 147/68 (!) 120 16 (!) 100.4 °F (38 °C) 96 %      MAP       --         Physical Exam  Constitutional: No acute distress, well-appearing, nontoxic  Respiratory: Non-labored, lungs clear  Cardiovascular: Well perfused, normal rate, regular rhythm  Gastrointestinal: Soft, non-tender, non-distended  Integumentary: Warm and dry  Musculoskeletal: No deformity  Neurological: Awake and alert  Psychiatric: Cooperative     ED Course   Procedures  Labs Reviewed   COMPREHENSIVE METABOLIC PANEL - Abnormal       Result Value    Sodium 138      Potassium 3.0 (*)     Chloride 107      CO2 17 (*)     Glucose 94      BUN 6      Creatinine 0.6      Calcium 8.7      Protein Total 7.1      Albumin 3.4 (*)     Bilirubin Total 0.4      ALP 55      AST 30      ALT 43      Anion Gap 14      eGFR >60     URINALYSIS, REFLEX TO URINE CULTURE - Abnormal    Color, UA Yellow      Appearance, UA Clear      pH, UA 6.0      Spec Grav UA 1.015      Protein, UA Negative      Glucose, UA Negative      Ketones, UA 2+ (*)     Bilirubin, UA Negative      Blood, UA Negative      Nitrites, UA Negative      Urobilinogen, UA 2.0-3.0 (*)     Leukocyte Esterase, UA Trace (*)    CBC WITH DIFFERENTIAL - Abnormal    WBC 8.40      RBC 4.10      HGB 12.8      HCT 37.2      MCV 91      MCH 31.2 (*)     MCHC 34.4      RDW 12.2       Platelet Count 219      MPV 12.9      Nucleated RBC 0      Neut % 85.3 (*)     Lymph % 5.8 (*)     Mono % 8.2      Eos % 0.0      Basophil % 0.2      Imm Grans % 0.5      Neut # 7.16      Lymph # 0.49 (*)     Mono # 0.69      Eos # 0.00      Baso # 0.02      Imm Grans # 0.04     POCT URINE PREGNANCY - Abnormal    POC Preg Test, Ur Positive (*)      Acceptable Yes     INFLUENZA A & B BY MOLECULAR - Normal    INFLUENZA A MOLECULAR Negative      INFLUENZA B MOLECULAR  Negative     SARS-COV-2 RNA AMPLIFICATION, QUAL - Normal    SARS COV-2 Molecular Negative     LIPASE - Normal    Lipase Level 12     CBC W/ AUTO DIFFERENTIAL    Narrative:     The following orders were created for panel order CBC auto differential.  Procedure                               Abnormality         Status                     ---------                               -----------         ------                     CBC with Differential[[id number]]       Abnormal            Final result                 Please view results for these tests on the individual orders.   HCG, QUANTITATIVE    Beta HCG Quant 48,262.70     GREY TOP URINE HOLD    Extra Tube Hold for add-ons.     URINALYSIS MICROSCOPIC    RBC, UA 1      WBC, UA 2      Bacteria, UA Rare      Squamous Epithelial Cells, UA 4      Microscopic Comment       GROUP & RH    Group & Rh O POS            Imaging Results    None          Medications   sodium chloride 0.9% bolus 1,000 mL 1,000 mL (0 mLs Intravenous Stopped 4/28/25 2309)   ondansetron injection 4 mg (4 mg Intravenous Given 4/28/25 2201)   potassium chloride SA CR tablet 40 mEq (40 mEq Oral Given 4/28/25 2231)     Medical Decision Making  The patient is a 24-year-old female who was approximately 12 weeks gestation.  She has a confirmed IUP on ultrasound from her OB visit on 04/09.  Bedside ultrasound performed by me shows good fetal movement and normal fetal heart tones.  Patient's abdomen is benign.  Initially patient was  tachycardic and febrile but symptoms improved by the time she was roomed and evaluated by me.  Given the vomiting and diarrhea, I suspect that she likely has a viral gastroenteritis given her benign abdominal exam.  She is not having any vaginal bleeding, pelvic pain or abnormal vaginal discharge.  Overall, she is very well-appearing.  Will obtain lab work, hydrate well and treat symptomatically. Dispo pending workup and re-evaluation.    Labs reviewed.  Patient hydrated for dehydration.  Her potassium was replaced.  She feels much better on re-evaluation and has a reassuring abdominal exam.  Vitals reassuring.  I suspect that she likely has gastroenteritis.  She was given very strict return precautions and counseled on symptomatic treatment and supportive care for home.  She was advised to follow up closely with her OBGYN.    Amount and/or Complexity of Data Reviewed  Labs:  Decision-making details documented in ED Course.    Risk  Prescription drug management.               ED Course as of 04/28/25 2349 Mon Apr 28, 2025 2154 Ultrasound from OB visit on 4/9 reviewed - Dating scan results:transvaginal US  Patient's last menstrual period was 02/08/2025.  Estimated Date of Delivery: None noted.  Ultrasound: 4/9/2025 = CRL = 2.19 cm   8 and 6/7 weeks   FHT's = 168   cwd   [NN]   2212 Potassium(!): 3.0  Oral replacement ordered [NN]   2212 UA without evidence of infection but does have 2+ ketones.  Bicarb 17.  Suspect dehydration from vomiting and diarrhea. [NN]   2213 WBC: 8.40  No leukocytosis [NN]   2213 LFTs are normal [NN]   2213 Normal kidney function [NN]   2213 COVID and influenza negative however suspect patient has viral gastroenteritis [NN]   2242 Bedside ultrasound performed by me shows single IUP with good fetal movement with fetal heart rate 135. [NN]   2328 Tolerating p.o. intake well.  Patient requesting discharge and feels much better after symptomatic treatment.  Repeat abdominal exam is benign.   Was reassuring. [NN]      ED Course User Index  [NN] Carolynn Smith MD                           Clinical Impression:  Final diagnoses:  [A08.4] Viral gastroenteritis (Primary)  [R11.2, R19.7] Nausea vomiting and diarrhea  [Z3A.12] 12 weeks gestation of pregnancy          ED Disposition Condition    Discharge Stable          ED Prescriptions       Medication Sig Dispense Start Date End Date Auth. Provider    ondansetron (ZOFRAN) 4 MG tablet Take 1 tablet (4 mg total) by mouth every 6 (six) hours. for 12 doses 12 tablet 4/28/2025 5/1/2025 Carolynn Smith MD          Follow-up Information       Follow up With Specialties Details Why Contact Info    Chris Rendon MD Family Medicine Schedule an appointment as soon as possible for a visit   [address] [facility]  [place] [address]  [phone]      [facility] - Emergency Dept Emergency Medicine  As needed, If symptoms worsen [address] [facility] [address] [phone]               [1]   Social History  Tobacco Use    Smoking status: Never    Smokeless tobacco: Never   Substance Use Topics    Alcohol use: Not Currently     Comment: social drink once sa month per the patient    Drug use: No        Carolynn Smith MD  04/28/25 7658

## 2025-04-29 NOTE — ED TRIAGE NOTES
Zeny Charles, a 24 y.o. female presents to the ED w/ complaint of     Triage note:  Chief Complaint   Patient presents with    Emesis     Reports 12wks preg +N/V. 100.2 Temp PTA.       Review of patient's allergies indicates:   Allergen Reactions    Hazelnut Hives and Rash     Past Medical History:   Diagnosis Date    Factor 5 Leiden mutation, heterozygous 09/13/2016    Multiple sclerosis 04/2023    Seizures     1 at the age of 10

## 2025-05-13 ENCOUNTER — PATIENT MESSAGE (OUTPATIENT)
Dept: PSYCHIATRY | Facility: CLINIC | Age: 25
End: 2025-05-13
Payer: COMMERCIAL

## 2025-05-19 RX ORDER — ONDANSETRON 4 MG/1
4 TABLET, ORALLY DISINTEGRATING ORAL EVERY 8 HOURS PRN
Qty: 20 TABLET | Refills: 3 | Status: SHIPPED | OUTPATIENT
Start: 2025-05-19

## 2025-06-13 ENCOUNTER — OFFICE VISIT (OUTPATIENT)
Dept: FAMILY MEDICINE | Facility: CLINIC | Age: 25
End: 2025-06-13
Payer: COMMERCIAL

## 2025-06-13 ENCOUNTER — LAB VISIT (OUTPATIENT)
Dept: LAB | Facility: HOSPITAL | Age: 25
End: 2025-06-13
Attending: FAMILY MEDICINE
Payer: COMMERCIAL

## 2025-06-13 VITALS
DIASTOLIC BLOOD PRESSURE: 80 MMHG | SYSTOLIC BLOOD PRESSURE: 118 MMHG | BODY MASS INDEX: 38.21 KG/M2 | TEMPERATURE: 99 F | OXYGEN SATURATION: 99 % | WEIGHT: 202.38 LBS | HEIGHT: 61 IN | HEART RATE: 98 BPM

## 2025-06-13 DIAGNOSIS — E87.6 HYPOKALEMIA: Primary | ICD-10-CM

## 2025-06-13 DIAGNOSIS — E66.01 SEVERE OBESITY (BMI 35.0-39.9) WITH COMORBIDITY: ICD-10-CM

## 2025-06-13 DIAGNOSIS — E87.6 HYPOKALEMIA: ICD-10-CM

## 2025-06-13 LAB
ANION GAP (OHS): 10 MMOL/L (ref 8–16)
BUN SERPL-MCNC: 6 MG/DL (ref 6–20)
CALCIUM SERPL-MCNC: 8.7 MG/DL (ref 8.7–10.5)
CHLORIDE SERPL-SCNC: 106 MMOL/L (ref 95–110)
CO2 SERPL-SCNC: 22 MMOL/L (ref 23–29)
CREAT SERPL-MCNC: 0.5 MG/DL (ref 0.5–1.4)
GFR SERPLBLD CREATININE-BSD FMLA CKD-EPI: >60 ML/MIN/1.73/M2
GLUCOSE SERPL-MCNC: 85 MG/DL (ref 70–110)
MAGNESIUM SERPL-MCNC: 1.4 MG/DL (ref 1.6–2.6)
POTASSIUM SERPL-SCNC: 3.2 MMOL/L (ref 3.5–5.1)
SODIUM SERPL-SCNC: 138 MMOL/L (ref 136–145)

## 2025-06-13 PROCEDURE — 1159F MED LIST DOCD IN RCRD: CPT | Mod: CPTII,S$GLB,, | Performed by: FAMILY MEDICINE

## 2025-06-13 PROCEDURE — 99214 OFFICE O/P EST MOD 30 MIN: CPT | Mod: S$GLB,,, | Performed by: FAMILY MEDICINE

## 2025-06-13 PROCEDURE — 99999 PR PBB SHADOW E&M-EST. PATIENT-LVL III: CPT | Mod: PBBFAC,,, | Performed by: FAMILY MEDICINE

## 2025-06-13 PROCEDURE — 3079F DIAST BP 80-89 MM HG: CPT | Mod: CPTII,S$GLB,, | Performed by: FAMILY MEDICINE

## 2025-06-13 PROCEDURE — 82310 ASSAY OF CALCIUM: CPT

## 2025-06-13 PROCEDURE — 3074F SYST BP LT 130 MM HG: CPT | Mod: CPTII,S$GLB,, | Performed by: FAMILY MEDICINE

## 2025-06-13 PROCEDURE — 36415 COLL VENOUS BLD VENIPUNCTURE: CPT | Mod: PO

## 2025-06-13 PROCEDURE — 3008F BODY MASS INDEX DOCD: CPT | Mod: CPTII,S$GLB,, | Performed by: FAMILY MEDICINE

## 2025-06-13 PROCEDURE — 83735 ASSAY OF MAGNESIUM: CPT

## 2025-06-13 RX ORDER — FOLIC ACID/MULTIVIT,IRON,MINER 0.4MG-18MG
1 TABLET ORAL
COMMUNITY
Start: 2025-04-09

## 2025-06-13 NOTE — PROGRESS NOTES
[facility]  Progress Note    SUBJECTIVE:     Chief Complaint   Patient presents with    Follow-up       HPI   Zeny Charles  is a 24 y.o. female here for follow-up of her chronic conditions. Doing well on current regimen. Feels back to baseline. Patient has no other new complaints/problems at this time.      Review of patient's allergies indicates:   Allergen Reactions    Hazelnut Hives and Rash       Past Medical History:   Diagnosis Date    Factor 5 Leiden mutation, heterozygous 09/13/2016    Multiple sclerosis 04/2023    Seizures     1 at the age of 10     Past Surgical History:   Procedure Laterality Date    ESOPHAGOGASTRODUODENOSCOPY N/A 4/24/2024    Procedure: EGD (ESOPHAGOGASTRODUODENOSCOPY);  Surgeon: Lei Naranjo MD;  Location: [facility] ([address] [place] [address]);  Service: Endoscopy;  Laterality: N/A;  2/22 pt rescheduled, portal -ml  4/19-lvm for precall-MS    TONSILLECTOMY  6 years old     Family History   Problem Relation Name Age of Onset    Crohn's disease Paternal Grandmother      Factor V Leiden deficiency Maternal Grandmother      Thyroid disease Maternal Grandmother          hypothyroidism    Mitral valve prolapse Maternal Grandfather      Congenital heart disease Maternal Grandfather          valve    No Known Problems Father      Hypertension Mother      Irregular menses Mother      Factor V Leiden deficiency Mother      Interstitial cystitis Mother      Factor V Leiden deficiency Sister      Interstitial cystitis Maternal Aunt      Breast cancer Neg Hx      Colon cancer Neg Hx      Ovarian cancer Neg Hx      Early death Neg Hx      Heart attacks under age 50 Neg Hx      Pacemaker/defibrilator Neg Hx      Esophageal cancer Neg Hx      Uterine cancer Neg Hx      Cervical cancer Neg Hx      Arrhythmia Neg Hx       Social History[1]     Review of Systems   Constitutional:  Negative for chills and fever.   HENT: Negative.     Respiratory: Negative.  Negative for shortness of breath.    Cardiovascular:  Negative.  Negative for chest pain.   Gastrointestinal: Negative.  Negative for abdominal pain, nausea and vomiting.   Genitourinary: Negative.    Neurological:  Negative for headaches.   All other systems reviewed and are negative.    OBJECTIVE:     Vitals:    06/13/25 1447   BP: 118/80   Pulse: 98   Temp: 98.6 °F (37 °C)     Body mass index is 38.24 kg/m².    Physical Exam  Constitutional:       General: She is not in acute distress.     Appearance: Normal appearance. She is not diaphoretic.   HENT:      Head: Normocephalic and atraumatic.   Eyes:      Conjunctiva/sclera: Conjunctivae normal.   Pulmonary:      Effort: Pulmonary effort is normal.   Neurological:      Mental Status: She is alert and oriented to person, place, and time.         Old records were reviewed. Labs and/or images were independently reviewed.    ASSESSMENT     1. Hypokalemia    2. Severe obesity (BMI 35.0-39.9) with comorbidity        PLAN:     Hypokalemia  -     Basic Metabolic Panel; Future; Expected date: 06/13/2025  -     Magnesium; Future; Expected date: 06/13/2025  -     recheck potassium levels. Advised to increase water intake.    Severe obesity (BMI 35.0-39.9) with comorbidity   -     Counseled patient about healthy diet, exercise habits, and to increase physical activity.      30 minutes of total time spent on the encounter, which includes face to face time and non-face to face time preparing to see the patient (eg, review of tests), Obtaining and/or reviewing separately obtained history, Documenting clinical information in the electronic or other health record, Independently interpreting results (not separately reported), communicating results to the patient/family/caregiver, and/or Care coordination (not separately reported).     RTC FIDE Rendon MD  06/13/2025 3:01 PM           [1]   Social History  Tobacco Use    Smoking status: Never    Smokeless tobacco: Never   Substance Use Topics    Alcohol use: Not Currently      Comment: social drink once sa month per the patient    Drug use: No

## 2025-06-16 ENCOUNTER — RESULTS FOLLOW-UP (OUTPATIENT)
Dept: FAMILY MEDICINE | Facility: CLINIC | Age: 25
End: 2025-06-16
Payer: COMMERCIAL

## 2025-06-19 ENCOUNTER — PATIENT MESSAGE (OUTPATIENT)
Dept: PSYCHIATRY | Facility: CLINIC | Age: 25
End: 2025-06-19
Payer: COMMERCIAL

## 2025-07-02 ENCOUNTER — OFFICE VISIT (OUTPATIENT)
Dept: NEUROLOGY | Facility: CLINIC | Age: 25
End: 2025-07-02
Payer: COMMERCIAL

## 2025-07-02 DIAGNOSIS — G35 MULTIPLE SCLEROSIS: Primary | Chronic | ICD-10-CM

## 2025-07-02 DIAGNOSIS — Z34.90 PREGNANCY, UNSPECIFIED GESTATIONAL AGE: ICD-10-CM

## 2025-07-02 NOTE — Clinical Note
Can you get her scheduled with [facility] ([facility]) at the end of September/beginning of October? Please and thank you!

## 2025-07-02 NOTE — PROGRESS NOTES
Patient ID: Zeny Charles is a 24 y.o. female who presents today for a routine clinic visit for MS.  She was last seen on 3/20/2025.  The history was provided by the patient.       The patient location is: at her home in [place], [place]  The chief complaint leading to consultation is: MS    Visit type: audiovisual    Face to Face time with patient: 10 minutes     Each patient to whom he or she provides medical services by telemedicine is:  (1) informed of the relationship between the physician and patient and the respective role of any other health care provider with respect to management of the patient; and (2) notified that he or she may decline to receive medical services by telemedicine and may withdraw from such care at any time.    NEURO MULTIPLE SCLEROISIS SUMMARY:   Principle neurological diagnosis:  MS  Date of Symptom Onset:  2022 - Comments: but possibly earlier  Date of Diagnosis:  2023  Disease type at diagnosis:  Relapsing-Remitting MS  Disease type currently:  Relapsing-Remitting MS  Previous Therapy:  First DMT:  Ofatumumab - 4821-1388 (pregnancy - last injection 2/2025)  Current Therapy:  None  Last MRI Brain:  4/2/2025 - stable  Last MRI C-Spine:  4/2/2025 - stable  Last MRI T-Spine:  4/2/2025 - stable  Labs   No CSF completed  Date JCV Completed:  9/18/2024  JCV Index:  1.33  JCV Antibody:  Positive  Other relevant labs and studies:    3/7/2023: NMO, MOG, and other mimics - negative   7/11/2024: vitamin D - 30.2  9/18/2024: NfL - 3.8     Subjective:     She states that pregnancy is going great and she is having a baby boy! Her updated due date is 11/8/2025.      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      She does report feeling stable neurologically.  She says that she does have some muscle cramps, but she attributes that to pregnancy and low magnesium.  Her OB did start her on magnesium supplements.     As of now she is planning on breastfeeding.       ROS:       3/19/2025     7:26 PM   REVIEW OF SYMPTOMS   Do you feel abnormally tired on most days? No   Do you feel you generally sleep well? Yes   Do you have difficulty controlling your bladder?  No   Do you have difficulty controlling your bowels?  No   Do you have frequent muscle cramps, tightness or spasms in your limbs?  No   Do you have new visual symptoms?  No   Do you have worsening difficulty with your memory or thinking? No   Do you have worsening symptoms of anxiety or depression?  No   For patients who walk, Do you have more difficulty walking?  No   Have you fallen since your last visit?  No   For patients who use wheelchairs: Do you have any skin wounds or breakdown? Not Applicable   Do you have difficulty using your hands?  No   Do you have shooting or burning pain? No   Do you have difficulty with sexual function?  No   If you are sexually active, are you using birth control? Y/N  N/A No   Do you often choke when swallowing liquids or solid food?  No   Do you experience worsening symptoms when overheated? Yes   Do you need any new equipment such as a wheelchair, walker or shower chair? No   Do you receive co-pay financial assistance for your principal MS medicine? Yes   Would you be interested in participating in an MS research trial in the future? Yes   For patients on Gilenya, Tecfidera, Aubagio, Rituxan, Ocrevus, Tysabri, Lemtrada or Methotrexate, are you aware that you should NOT receive live virus vaccines?  Not Applicable   Do you feel you have adequate family/friend support?  Yes   Do you have health insurance?   Yes   Are you currently employed? Yes   Do you receive SSDI/SSI?  Not Applicable   Do you use marijuana or cannabis products? No   Have you been diagnosed with a urinary tract infection since your last visit here? No   Have you been diagnosed with a respiratory tract infection since your last visit here? No   Have you been to the emergency room since your last visit here? No   Have you been  hospitalized since your last visit here?  No            3/19/2025     7:28 PM   FSS SCORE & INTERPRETATION   FSS SCORE  26    FSS SCORE INTERPRETATION May not be suffering from fatigue        Patient-reported         3/19/2025     7:27 PM   MS MAYELA-D SCORE & INTERPRETATION   MAYELA-D SCORE  5    MAYELA-D INTERPRETATION  No indication of Depression        Patient-reported         3/19/2025     7:26 PM   MS JENNIFER-7 SCORE & INTERPRETATION   JENNIFER-7 SCORE  1    JENNIFER-7 SCORE INTERPRETATION Normal        Patient-reported         3/19/2025     7:28 PM   PEQ MS MOS PAIN EFFECTS SCORE & INTERPRETATION   PES SCORE 6    PES SCORE INTERPRETATION Scores can range from 6-30.  Items are scaled so that higher scores indicate a greater impact of pain on a patients mood and behavior.        Patient-reported         3/19/2025     7:29 PM   PEQ MS SEXUAL SATISFACTION SCORE & INTERPRETATION   SSS SCORE  4    SSS SCORE INTERPRETATION Scores can range from 4-24.  Higher scores indicate greater problems with sexual satisfaction.        Patient-reported         3/19/2025     7:30 PM   MS BLADDER CONTROL SCORE & INTERPRETATION   BLCS SCORE 0    BLCS SCORE INTERPRETATION  Scores can range from 0-22, with higher scores indicating greater bladder control problems.        Patient-reported         3/19/2025     7:31 PM   MS BOWEL CONTROL SCORE & INTERPRETATION   BWCS SCORE 0    BWCS SCORE INTERPRETATION Scores can range from 0-26, with higher scores indicating greater bowel control problems.        Patient-reported         3/19/2025     7:30 PM   PEQ MS IMPACT OF VISUAL IMPAIRMENT SCORE & INTERPRETATION   LEELEE SCALE SCORE  0    LEELEE SCORE INTERPRETATION Scores can range from 0-15, with higher scores indicating greater impact of visual problems on daily activites.        Patient-reported         3/19/2025     7:31 PM   MS PDQ SCORE & INTERPRETATION   PDQ RETROSPECTIVE MEMORY SUBSCALE 0    PDQ ATTENTION/CONCENTRATION SUBSCALE 2    PDQ PROSPECTIVE MEMORY  SUBSCALE 3    PDQ PLANNING/ORGANIZATION SUBSCALE 2    PDQ TOTAL SCORE 7    PDQ SCORE INTERPRETATION Scores can range from 0-80, with higher scores indicating greater perceived cognitive impairment.        Patient-reported          No data to display                 SOCIAL HISTORY  Living arrangements - the patient lives with their family.  Social History     Socioeconomic History    Marital status: Single   Tobacco Use    Smoking status: Never    Smokeless tobacco: Never   Substance and Sexual Activity    Alcohol use: Not Currently     Comment: social drink once sa month per the patient    Drug use: No    Sexual activity: Yes     Partners: Male     Birth control/protection: None   Other Topics Concern    Are you pregnant or think you may be? No    Breast-feeding No   Social History Narrative    Lives with Mom and Dad. No smokers. No alcohol, tobacco, illicit drugs. 1 dog.      Social Drivers of Health     Financial Resource Strain: Low Risk  (7/2/2025)    Overall Financial Resource Strain (CARDIA)     Difficulty of Paying Living Expenses: Not hard at all   Food Insecurity: No Food Insecurity (7/2/2025)    Hunger Vital Sign     Worried About Running Out of Food in the Last Year: Never true     Ran Out of Food in the Last Year: Never true   Transportation Needs: No Transportation Needs (7/2/2025)    PRAPARE - Transportation     Lack of Transportation (Medical): No     Lack of Transportation (Non-Medical): No   Physical Activity: Insufficiently Active (7/2/2025)    Exercise Vital Sign     Days of Exercise per Week: 1 day     Minutes of Exercise per Session: 10 min   Stress: No Stress Concern Present (7/2/2025)    [employer] [place] [employer] - Occupational Stress Questionnaire     Feeling of Stress : Not at all   Housing Stability: Low Risk  (7/2/2025)    Housing Stability Vital Sign     Unable to Pay for Housing in the Last Year: No     Number of Times Moved in the Last Year: 0     Homeless in the Last  Year: No       Medications Ordered Prior to Encounter[1]    Objective:     1. 25 foot timed walk:      12/4/2024     8:40 AM 3/20/2025     9:30 AM   Timed 25 Foot Walk:   Did patient wear an AFO? No No   Was assistive device used? No No   Time for 25 Foot Walk (seconds) 4.43 4.13   Time for 25 Foot Walk (seconds) 4.76 4.13       2. SDMT       No data to display                       NEURO EXAM    In general, the patient is well nourished and appears to be in no acute distress.    MENTAL STATUS: language is fluent, normal verbal comprehension, short-term and remote memory is intact, attention is normal, patient is alert and oriented x 3, fund of knowlege is appropriate by vocabulary.       Imaging: personally reviewed      Results for orders placed during the hospital encounter of 04/02/25    MRI Brain Demyelinating Without Contrast    Impression  Brain appears stable from prior exam, again demonstrating findings compatible with the reported history of multiple sclerosis.  No new discrete lesions to indicate ongoing demyelination.      Electronically signed by: Kermit Dolan  Date:    04/02/2025  Time:    13:00    Results for orders placed during the hospital encounter of 04/02/25    MRI Cervical Spine Demyelinating Without Contrast    Impression  Stable appearance of the cervical and thoracic cord, noting few short-segment T2/stir hyperintense lesions as detailed above.  No definite new cord lesions or abnormal cord signal.      Electronically signed by: Kermit Dolan  Date:    04/02/2025  Time:    13:09    Results for orders placed during the hospital encounter of 04/02/25    MRI Thoracic Spine Demyelinating Without Contrast    Impression  Stable appearance of the cervical and thoracic cord, noting few short-segment T2/stir hyperintense lesions as detailed above.  No definite new cord lesions or abnormal cord signal.      Electronically signed by: Kermit Dolan  Date:    04/02/2025  Time:    13:09    Results for orders  "placed during the hospital encounter of 02/07/23    MRI Brain Demyelinating W W/O Contrast    Impression  Active demyelinating disease as described progressed since the prior exam.      Electronically signed by: Arnav Valencia Jr  Date:    02/08/2023  Time:    10:10    Results for orders placed during the hospital encounter of 02/07/23    MRI Cervical Spine Demyelinating W W/O Contrast    Impression  Findings in keeping with patient's known demyelinating disease with faint areas of enhancement in the spinal cord that may reflect active demyelination.      Electronically signed by: Arnav Valencia Jr  Date:    02/08/2023  Time:    10:29    No results found for this or any previous visit.        Labs: personally reviewed      Lab Results   Component Value Date    MBRAJIGS68NM 28 (L) 03/31/2025    LJTVVRUH58QL 19 (L) 02/03/2023     Lab Results   Component Value Date    JCVINDEX 1.33 (H) 09/18/2024    JCVANTIBODY POSITIVE (A) 09/18/2024     No results found for: "OZ3SIYDU", "ABSOLUTECD3", "EV5VRCQQ", "ABSOLUTECD8", "KV0FQCMR", "ABSOLUTECD4", "LABCD48"  Lab Results   Component Value Date    WBC 8.40 04/28/2025    RBC 4.10 04/28/2025    HGB 12.8 04/28/2025    HCT 37.2 04/28/2025    MCV 91 04/28/2025    MCH 31.2 (H) 04/28/2025    MCHC 34.4 04/28/2025    RDW 12.2 04/28/2025     04/28/2025    MPV 12.9 04/28/2025    GRAN 5.3 09/18/2024    GRAN 66.2 09/18/2024    GRAN 5.3 09/18/2024    GRAN 66.2 09/18/2024    LYMPH 5.8 (L) 04/28/2025    LYMPH 0.49 (L) 04/28/2025    MONO 8.2 04/28/2025    MONO 0.69 04/28/2025    EOS 0.0 04/28/2025    EOS 0.00 04/28/2025    BASO 0.06 09/18/2024    BASO 0.06 09/18/2024    EOSINOPHIL 0.6 09/18/2024    EOSINOPHIL 0.6 09/18/2024    BASOPHIL 0.2 04/28/2025    BASOPHIL 0.02 04/28/2025     Sodium   Date Value Ref Range Status   06/24/2025 139 135 - 146 mmol/L Final   09/18/2024 137 136 - 145 mmol/L Final   09/18/2024 137 136 - 145 mmol/L Final     Potassium   Date Value Ref Range Status "   06/24/2025 3.8 3.5 - 5.3 mmol/L Final   09/18/2024 3.7 3.5 - 5.1 mmol/L Final   09/18/2024 3.7 3.5 - 5.1 mmol/L Final     Chloride   Date Value Ref Range Status   06/13/2025 106 95 - 110 mmol/L Final   09/18/2024 101 95 - 110 mmol/L Final   09/18/2024 101 95 - 110 mmol/L Final     CO2   Date Value Ref Range Status   09/18/2024 25 23 - 29 mmol/L Final   09/18/2024 25 23 - 29 mmol/L Final     Carbon Dioxide   Date Value Ref Range Status   06/24/2025 25 20 - 32 mmol/L Final     Glucose   Date Value Ref Range Status   06/24/2025 80 65 - 99 mg/dL Final     Comment:                  Fasting reference interval   09/18/2024 83 70 - 110 mg/dL Final   09/18/2024 83 70 - 110 mg/dL Final     BUN   Date Value Ref Range Status   06/13/2025 6 6 - 20 mg/dL Final     Blood Urea Nitrogen   Date Value Ref Range Status   06/24/2025 6 (L) 7 - 25 mg/dL Final     Creatinine   Date Value Ref Range Status   06/24/2025 0.45 (L) 0.50 - 0.96 mg/dL Final   06/13/2025 0.5 0.5 - 1.4 mg/dL Final     Calcium   Date Value Ref Range Status   06/24/2025 8.9 8.6 - 10.2 mg/dL Final   09/18/2024 9.5 8.7 - 10.5 mg/dL Final   09/18/2024 9.5 8.7 - 10.5 mg/dL Final     Protein Total   Date Value Ref Range Status   04/28/2025 7.1 6.0 - 8.4 gm/dL Final     Total Protein   Date Value Ref Range Status   09/18/2024 8.0 6.0 - 8.4 g/dL Final   09/18/2024 8.0 6.0 - 8.4 g/dL Final     Albumin   Date Value Ref Range Status   09/18/2024 4.2 3.5 - 5.2 g/dL Final   09/18/2024 4.2 3.5 - 5.2 g/dL Final     Albumin Level   Date Value Ref Range Status   06/24/2025 3.6 3.6 - 5.1 g/dL Final     Total Bilirubin   Date Value Ref Range Status   06/24/2025 0.4 0.2 - 1.2 mg/dL Final   09/18/2024 0.6 0.1 - 1.0 mg/dL Final     Comment:     For infants and newborns, interpretation of results should be based  on gestational age, weight and in agreement with clinical  observations.    Premature Infant recommended reference ranges:  Up to 24 hours.............<8.0 mg/dL  Up to 48  hours............<12.0 mg/dL  3-5 days..................<15.0 mg/dL  6-29 days.................<15.0 mg/dL     09/18/2024 0.6 0.1 - 1.0 mg/dL Final     Comment:     For infants and newborns, interpretation of results should be based  on gestational age, weight and in agreement with clinical  observations.    Premature Infant recommended reference ranges:  Up to 24 hours.............<8.0 mg/dL  Up to 48 hours............<12.0 mg/dL  3-5 days..................<15.0 mg/dL  6-29 days.................<15.0 mg/dL       Alkaline Phosphatase   Date Value Ref Range Status   06/24/2025 54 31 - 125 U/L Final   09/18/2024 68 55 - 135 U/L Final   09/18/2024 68 55 - 135 U/L Final     AST   Date Value Ref Range Status   06/24/2025 14 10 - 30 U/L Final   09/18/2024 19 10 - 40 U/L Final   09/18/2024 19 10 - 40 U/L Final     ALT   Date Value Ref Range Status   06/24/2025 20 6 - 29 U/L Final   09/18/2024 34 10 - 44 U/L Final   09/18/2024 34 10 - 44 U/L Final     Anion Gap   Date Value Ref Range Status   06/13/2025 10 8 - 16 mmol/L Final     eGFR if    Date Value Ref Range Status   03/17/2021 >60 >60 mL/min/1.73 m^2 Final     eGFR if non    Date Value Ref Range Status   03/17/2021 >60 >60 mL/min/1.73 m^2 Final     Comment:     Calculation used to obtain the estimated glomerular filtration  rate (eGFR) is the CKD-EPI equation.        Lab Results   Component Value Date    HEPBSAG Non-Reactive 03/31/2025    HEPBSAB 9.90 09/18/2024    HEPBSAB Grayzone 09/18/2024    HEPBCAB Non-reactive 09/18/2024           MS Impression and Plan:     NEURO MULTIPLE SCLEROSIS IMPRESSION:   Number of relapses in the past year?:  0  Clinical Progression:  Clinically Stable  MS Classification:  Relapsing-Remitting MS  Current DMT: none  DMT:  No change in management  Symptom Management:  No change in symptom management  Additional Impressions:   Patient with RRMS currently off DMT due to pregnancy. Remaining stable clinically  and radiologically.   Discussed common symptoms of pregnancy and MS and recommended to continue magnesium supplements per OB orders, and staying hydrated.   Also discussed benefits of MS and breastfeeding in general and the current research that is supporting safety with Kesimpta and breastfeeding.   Will solidify postpartum plan at next visit, but as of now, given patient's neurological stability before pregnancy, most likely ok to hold steroids immediately after delivery and consider starting Kesimpta soon after while breastfeeding, holding DMT until after breastfeeding, or starting lower efficacy (Copaxone or interferons) DMT while breastfeeding.   Follow up with Dr. Funk end of September/beginning of October to review pregnancy plan.   Plan discussed and questions were answered to satisfaction.     Problem List Items Addressed This Visit       Multiple sclerosis - Primary (Chronic)     Other Visit Diagnoses         Pregnancy, unspecified gestational age                 I spent a total of 25 minutes on the day of the visit.This includes face to face time and non-face to face time preparing to see the patient (eg, review of tests), obtaining and/or reviewing separately obtained history, documenting clinical information in the electronic or other health record, independently interpreting results and communicating results to the patient/family/caregiver, or care coordinator.       Deepti Patel, ARMINP-C         [1]   Current Outpatient Medications on File Prior to Visit   Medication Sig Dispense Refill    ondansetron (ZOFRAN-ODT) 4 MG TbDL Take 1 tablet (4 mg total) by mouth every 8 (eight) hours as needed (nausea). 20 tablet 3    PNV cmb#95-ferrous fumarate-FA (PRENATAL) 28 mg iron- 800 mcg Tab Take 1 tablet by mouth.       No current facility-administered medications on file prior to visit.

## 2025-07-05 ENCOUNTER — PATIENT MESSAGE (OUTPATIENT)
Dept: DERMATOLOGY | Facility: CLINIC | Age: 25
End: 2025-07-05
Payer: COMMERCIAL

## 2025-07-08 ENCOUNTER — PATIENT MESSAGE (OUTPATIENT)
Dept: FAMILY MEDICINE | Facility: CLINIC | Age: 25
End: 2025-07-08
Payer: COMMERCIAL

## 2025-07-10 ENCOUNTER — OFFICE VISIT (OUTPATIENT)
Dept: FAMILY MEDICINE | Facility: CLINIC | Age: 25
End: 2025-07-10
Payer: COMMERCIAL

## 2025-07-10 VITALS
SYSTOLIC BLOOD PRESSURE: 110 MMHG | HEIGHT: 61 IN | OXYGEN SATURATION: 97 % | TEMPERATURE: 98 F | BODY MASS INDEX: 38.81 KG/M2 | HEART RATE: 93 BPM | DIASTOLIC BLOOD PRESSURE: 64 MMHG | WEIGHT: 205.56 LBS

## 2025-07-10 DIAGNOSIS — F41.9 ANXIETY AND DEPRESSION: Primary | ICD-10-CM

## 2025-07-10 DIAGNOSIS — F32.A ANXIETY AND DEPRESSION: Primary | ICD-10-CM

## 2025-07-10 PROCEDURE — 99999 PR PBB SHADOW E&M-EST. PATIENT-LVL III: CPT | Mod: PBBFAC,,, | Performed by: FAMILY MEDICINE

## 2025-07-10 PROCEDURE — 99214 OFFICE O/P EST MOD 30 MIN: CPT | Mod: S$GLB,,, | Performed by: FAMILY MEDICINE

## 2025-07-10 PROCEDURE — 3008F BODY MASS INDEX DOCD: CPT | Mod: CPTII,S$GLB,, | Performed by: FAMILY MEDICINE

## 2025-07-10 PROCEDURE — 3078F DIAST BP <80 MM HG: CPT | Mod: CPTII,S$GLB,, | Performed by: FAMILY MEDICINE

## 2025-07-10 PROCEDURE — 1159F MED LIST DOCD IN RCRD: CPT | Mod: CPTII,S$GLB,, | Performed by: FAMILY MEDICINE

## 2025-07-10 PROCEDURE — 3074F SYST BP LT 130 MM HG: CPT | Mod: CPTII,S$GLB,, | Performed by: FAMILY MEDICINE

## 2025-07-10 NOTE — PROGRESS NOTES
[facility]  Progress Note    SUBJECTIVE:     Chief Complaint   Patient presents with    Anxiety    Stress       HPI   Zeyn Charles  is a 24 y.o. female here for increasing anxiety and depression. She is an RN and currently pregnant. A lot of stressors from family and workplace especially. Willing to try therapy sessions. And needs some time off work. Patient has no other new complaints/problems at this time.      Review of patient's allergies indicates:   Allergen Reactions    Hazelnut Hives and Rash       Past Medical History:   Diagnosis Date    Factor 5 Leiden mutation, heterozygous 09/13/2016    Multiple sclerosis 04/2023    Seizures     1 at the age of 10     Past Surgical History:   Procedure Laterality Date    ESOPHAGOGASTRODUODENOSCOPY N/A 4/24/2024    Procedure: EGD (ESOPHAGOGASTRODUODENOSCOPY);  Surgeon: Lei Naranjo MD;  Location: [address]);  Service: Endoscopy;  Laterality: N/A;  2/22 pt rescheduled, portal -ml  4/19-lvm for precall-MS    TONSILLECTOMY  6 years old     Family History   Problem Relation Name Age of Onset    Crohn's disease Paternal Grandmother      Factor V Leiden deficiency Maternal Grandmother      Thyroid disease Maternal Grandmother          hypothyroidism    Mitral valve prolapse Maternal Grandfather      Congenital heart disease Maternal Grandfather          valve    No Known Problems Father      Hypertension Mother      Irregular menses Mother      Factor V Leiden deficiency Mother      Interstitial cystitis Mother      Factor V Leiden deficiency Sister      Interstitial cystitis Maternal Aunt      Breast cancer Neg Hx      Colon cancer Neg Hx      Ovarian cancer Neg Hx      Early death Neg Hx      Heart attacks under age 50 Neg Hx      Pacemaker/defibrilator Neg Hx      Esophageal cancer Neg Hx      Uterine cancer Neg Hx      Cervical cancer Neg Hx      Arrhythmia Neg Hx       Social History[1]     Review of Systems   Constitutional:  Negative for chills and  fever.   HENT: Negative.     Respiratory: Negative.  Negative for shortness of breath.    Cardiovascular: Negative.  Negative for chest pain.   Gastrointestinal: Negative.  Negative for abdominal pain, nausea and vomiting.   Genitourinary: Negative.    Neurological:  Negative for headaches.   Psychiatric/Behavioral:  Positive for depression. The patient is nervous/anxious.    All other systems reviewed and are negative.    OBJECTIVE:     Vitals:    07/10/25 1458   BP: 110/64   Pulse: 93   Temp: 98.2 °F (36.8 °C)     Body mass index is 38.84 kg/m².    Physical Exam  Constitutional:       General: She is not in acute distress.     Appearance: Normal appearance. She is not diaphoretic.   HENT:      Head: Normocephalic and atraumatic.   Eyes:      Conjunctiva/sclera: Conjunctivae normal.   Pulmonary:      Effort: Pulmonary effort is normal.   Neurological:      Mental Status: She is alert and oriented to person, place, and time.   Psychiatric:         Mood and Affect: Mood is anxious and depressed. Mood is not elated. Affect is not labile, blunt, flat, angry, tearful or inappropriate.         Old records were reviewed. Labs and/or images were independently reviewed.    ASSESSMENT     1. Anxiety and depression        PLAN:     Anxiety and depression  -     Ambulatory referral/consult to Psychiatry; Future; Expected date: 07/17/2025  -     will fill out forms for medical leave from 7/16/25 to 8/6/25 for evaluation/treatment and therapy sessions. No medications prescribed at this time, as patient is currently pregnant.        RTC PRN  30 minutes of total time spent on the encounter, which includes face to face time and non-face to face time preparing to see the patient (eg, review of tests), Obtaining and/or reviewing separately obtained history, Documenting clinical information in the electronic or other health record, Independently interpreting results (not separately reported), communicating results to the  patient/family/caregiver, and/or Care coordination (not separately reported).     Chris Rendon MD  07/10/2025 3:09 PM           [1]   Social History  Tobacco Use    Smoking status: Never    Smokeless tobacco: Never   Substance Use Topics    Alcohol use: Not Currently     Comment: social drink once sa month per the patient    Drug use: No

## 2025-07-15 ENCOUNTER — PATIENT MESSAGE (OUTPATIENT)
Dept: FAMILY MEDICINE | Facility: CLINIC | Age: 25
End: 2025-07-15
Payer: COMMERCIAL

## 2025-07-16 ENCOUNTER — TELEPHONE (OUTPATIENT)
Dept: FAMILY MEDICINE | Facility: CLINIC | Age: 25
End: 2025-07-16
Payer: COMMERCIAL

## 2025-07-16 NOTE — TELEPHONE ENCOUNTER
Copied from CRM #[id number]. Topic: General Inquiry - Return Call  >> Jul 16, 2025 11:29 AM Robbin wrote:  Type: Patient Call Back    Who called: self    What is the request in detail: Patient states her [employer] paper work is due by the end of today. Patient requesting the status of it. Needs a call today    Best call back number: [phone]        Additional Information:

## 2025-07-23 ENCOUNTER — PATIENT MESSAGE (OUTPATIENT)
Dept: FAMILY MEDICINE | Facility: CLINIC | Age: 25
End: 2025-07-23
Payer: COMMERCIAL

## 2025-07-24 ENCOUNTER — PATIENT MESSAGE (OUTPATIENT)
Dept: FAMILY MEDICINE | Facility: CLINIC | Age: 25
End: 2025-07-24
Payer: COMMERCIAL

## 2025-07-24 ENCOUNTER — TELEPHONE (OUTPATIENT)
Dept: NEUROLOGY | Facility: CLINIC | Age: 25
End: 2025-07-24
Payer: COMMERCIAL

## 2025-07-30 ENCOUNTER — PATIENT MESSAGE (OUTPATIENT)
Dept: PSYCHIATRY | Facility: CLINIC | Age: 25
End: 2025-07-30
Payer: COMMERCIAL

## 2025-08-01 ENCOUNTER — PATIENT MESSAGE (OUTPATIENT)
Dept: PSYCHIATRY | Facility: CLINIC | Age: 25
End: 2025-08-01
Payer: COMMERCIAL

## 2025-08-05 ENCOUNTER — PATIENT MESSAGE (OUTPATIENT)
Dept: FAMILY MEDICINE | Facility: CLINIC | Age: 25
End: 2025-08-05

## 2025-08-05 ENCOUNTER — OFFICE VISIT (OUTPATIENT)
Dept: FAMILY MEDICINE | Facility: CLINIC | Age: 25
End: 2025-08-05
Payer: COMMERCIAL

## 2025-08-05 DIAGNOSIS — F41.9 ANXIETY AND DEPRESSION: Primary | ICD-10-CM

## 2025-08-05 DIAGNOSIS — F32.A ANXIETY AND DEPRESSION: Primary | ICD-10-CM

## 2025-08-05 PROCEDURE — 98005 SYNCH AUDIO-VIDEO EST LOW 20: CPT | Mod: 95,,, | Performed by: FAMILY MEDICINE

## 2025-08-05 NOTE — PROGRESS NOTES
[facility]  Progress Note    SUBJECTIVE:     Chief Complaint   Patient presents with    Anxiety       The patient location is: Home  The chief complaint leading to consultation is: Anxiety    Visit type: Virtual visit with synchronous audio and video  Total time spent with patient: 25  Each patient to whom he or she provides medical services by telemedicine is:  (1) informed of the relationship between the physician and patient and the respective role of any other health care provider with respect to management of the patient; and (2) notified that he or she may decline to receive medical services by telemedicine and may withdraw from such care at any time.      HPI: Patient is a 24 y.o. female via virtual visit, here for follow-up of her anxiety. Doing better and is ready to go back to work. Patient has no other new complaints/problems at this time.      Review of patient's allergies indicates:   Allergen Reactions    Hazelnut Hives and Rash       Past Medical History:   Diagnosis Date    Factor 5 Leiden mutation, heterozygous 09/13/2016    Multiple sclerosis 04/2023    Seizures     1 at the age of 10     Past Surgical History:   Procedure Laterality Date    ESOPHAGOGASTRODUODENOSCOPY N/A 4/24/2024    Procedure: EGD (ESOPHAGOGASTRODUODENOSCOPY);  Surgeon: Lei Naranjo MD;  Location: [address]);  Service: Endoscopy;  Laterality: N/A;  2/22 pt rescheduled, portal -ml  4/19-lvm for precall-MS    TONSILLECTOMY  6 years old     Family History   Problem Relation Name Age of Onset    Crohn's disease Paternal Grandmother      Factor V Leiden deficiency Maternal Grandmother      Thyroid disease Maternal Grandmother          hypothyroidism    Mitral valve prolapse Maternal Grandfather      Congenital heart disease Maternal Grandfather          valve    No Known Problems Father      Hypertension Mother      Irregular menses Mother      Factor V Leiden deficiency Mother      Interstitial cystitis Mother       Factor V Leiden deficiency Sister      Interstitial cystitis Maternal Aunt      Breast cancer Neg Hx      Colon cancer Neg Hx      Ovarian cancer Neg Hx      Early death Neg Hx      Heart attacks under age 50 Neg Hx      Pacemaker/defibrilator Neg Hx      Esophageal cancer Neg Hx      Uterine cancer Neg Hx      Cervical cancer Neg Hx      Arrhythmia Neg Hx       Social History[1]     Review of Systems   HENT:  Negative for hearing loss.    Eyes:  Negative for discharge.   Respiratory:  Negative for wheezing.    Cardiovascular:  Negative for chest pain and palpitations.   Gastrointestinal:  Negative for constipation, diarrhea and vomiting.   Genitourinary:  Negative for hematuria.   Neurological:  Negative for headaches.     OBJECTIVE:   There were no vitals filed for this visit.  There is no height or weight on file to calculate BMI.    Physical Exam    Old records were reviewed. Labs and/or images were independently reviewed.    ASSESSMENT     1. Anxiety and depression        PLAN:     Anxiety and depression   -     cleared to go back to work. Letter made.    RTC PRKYARA Rendon MD  08/05/2025 11:03 AM           [1]   Social History  Tobacco Use    Smoking status: Never    Smokeless tobacco: Never   Substance Use Topics    Alcohol use: Not Currently     Comment: social drink once sa month per the patient    Drug use: No

## 2025-08-05 NOTE — LETTER
August 5, 2025      [facility]  [address] [facility]  [place] [address]  Phone: [phone]  Fax: [phone]       Patient: Zeny Charles   YOB: 2000  Date of Visit: 08/05/2025    To Whom It May Concern:    Susana Charles  was at [facility] on 08/05/2025. The patient may return to work on tomorrow with no restrictions. If you have any questions or concerns, or if I can be of further assistance, please do not hesitate to contact me.    Sincerely,      Chris Rendon MD

## 2025-08-06 NOTE — TELEPHONE ENCOUNTER
Copied from CRM #[id number]. Topic: General Inquiry - Patient Advice  >> Aug 6, 2025  1:19 PM Duyen wrote:  .Type: Patient Call Back    Who called: self     What is the request in detail: pts job needs the letter to come from the provider for it to be accepted , pt has to go back to work tonight.     Can the clinic reply by [facility]? Call back     Would the patient rather a call back or a response via [facility]?  Call back     Best call back number: .[phone]      Additional Information: please fax letter to fax # [phone]

## 2025-08-18 ENCOUNTER — PATIENT MESSAGE (OUTPATIENT)
Dept: PSYCHIATRY | Facility: CLINIC | Age: 25
End: 2025-08-18
Payer: COMMERCIAL